# Patient Record
Sex: FEMALE | Race: WHITE | NOT HISPANIC OR LATINO | Employment: OTHER | URBAN - METROPOLITAN AREA
[De-identification: names, ages, dates, MRNs, and addresses within clinical notes are randomized per-mention and may not be internally consistent; named-entity substitution may affect disease eponyms.]

---

## 2017-02-03 ENCOUNTER — PATIENT OUTREACH (OUTPATIENT)
Dept: ADMINISTRATIVE | Facility: HOSPITAL | Age: 82
End: 2017-02-03

## 2017-02-03 NOTE — LETTER
February 3, 2017    Francie Olsen  [address]  [place] [address]             [facility]  [address]  [facility] [address]  Phone: [phone] Dear Ms. Olsen:    [facility] is committed to your overall health.  To help you get the most out of each of your visits, we will review your information to make sure you are up to date on all of your recommended tests and/or procedures.      Evan Bernard MD has found that you may be due for   Health Maintenance Due   Topic    TETANUS VACCINE     Zoster Vaccine     Pneumococcal Vaccine    Mammogram     Influenza Vaccine     Lipid Panel     If you have had any of the above done at another facility, please bring the records or information with you so that your record at [facility] will be complete.    If you are currently taking medication, please bring it with you to your appointment for review.    We will be happy to assist you with scheduling any necessary appointments or you may contact the [facility] appointment desk at [phone] to schedule at your convenience.     Thank you for choosing [facility] for your healthcare needs,    Camelia SOTO LPN Care Coordinator  Care Coordination Department  [facility]

## 2017-03-06 ENCOUNTER — TELEPHONE (OUTPATIENT)
Dept: FAMILY MEDICINE | Facility: CLINIC | Age: 82
End: 2017-03-06

## 2017-03-06 DIAGNOSIS — R10.2 PELVIC PAIN: Primary | ICD-10-CM

## 2017-03-08 ENCOUNTER — TELEPHONE (OUTPATIENT)
Dept: FAMILY MEDICINE | Facility: CLINIC | Age: 82
End: 2017-03-08

## 2017-03-08 ENCOUNTER — HOSPITAL ENCOUNTER (OUTPATIENT)
Dept: RADIOLOGY | Facility: HOSPITAL | Age: 82
Discharge: HOME | End: 2017-03-08
Attending: INTERNAL MEDICINE
Payer: MEDICARE

## 2017-03-08 ENCOUNTER — OFFICE VISIT (OUTPATIENT)
Dept: FAMILY MEDICINE | Facility: CLINIC | Age: 82
End: 2017-03-08
Payer: MEDICARE

## 2017-03-08 VITALS
WEIGHT: 112.63 LBS | TEMPERATURE: 97 F | DIASTOLIC BLOOD PRESSURE: 70 MMHG | BODY MASS INDEX: 19.96 KG/M2 | SYSTOLIC BLOOD PRESSURE: 112 MMHG | HEART RATE: 78 BPM | HEIGHT: 63 IN | RESPIRATION RATE: 18 BRPM | OXYGEN SATURATION: 97 %

## 2017-03-08 DIAGNOSIS — M54.50 MIDLINE LOW BACK PAIN WITHOUT SCIATICA, UNSPECIFIED CHRONICITY: ICD-10-CM

## 2017-03-08 DIAGNOSIS — M54.50 MIDLINE LOW BACK PAIN WITHOUT SCIATICA, UNSPECIFIED CHRONICITY: Primary | ICD-10-CM

## 2017-03-08 PROCEDURE — 1159F MED LIST DOCD IN RCRD: CPT | Mod: S$GLB,,, | Performed by: INTERNAL MEDICINE

## 2017-03-08 PROCEDURE — 99999 PR PBB SHADOW E&M-EST. PATIENT-LVL III: CPT | Mod: PBBFAC,,, | Performed by: INTERNAL MEDICINE

## 2017-03-08 PROCEDURE — 1157F ADVNC CARE PLAN IN RCRD: CPT | Mod: S$GLB,,, | Performed by: INTERNAL MEDICINE

## 2017-03-08 PROCEDURE — 72100 X-RAY EXAM L-S SPINE 2/3 VWS: CPT | Mod: 26,,, | Performed by: RADIOLOGY

## 2017-03-08 PROCEDURE — 1125F AMNT PAIN NOTED PAIN PRSNT: CPT | Mod: S$GLB,,, | Performed by: INTERNAL MEDICINE

## 2017-03-08 PROCEDURE — 1160F RVW MEDS BY RX/DR IN RCRD: CPT | Mod: S$GLB,,, | Performed by: INTERNAL MEDICINE

## 2017-03-08 PROCEDURE — 72100 X-RAY EXAM L-S SPINE 2/3 VWS: CPT | Mod: TC,PO

## 2017-03-08 PROCEDURE — 99213 OFFICE O/P EST LOW 20 MIN: CPT | Mod: S$GLB,,, | Performed by: INTERNAL MEDICINE

## 2017-03-08 PROCEDURE — 99499 UNLISTED E&M SERVICE: CPT | Mod: S$GLB,,, | Performed by: INTERNAL MEDICINE

## 2017-03-08 NOTE — PROGRESS NOTES
Subjective:       Patient ID: Francie Olsen is a 89 y.o. female.    Chief Complaint: Follow-up; Tailbone Pain; and Back Pain  ------------------continues with pain---------now primarily in lower back----------no radiation----------  HPI  Review of Systems   Constitutional: Negative for chills and fever.   HENT: Negative.    Respiratory: Negative for apnea, cough, choking, chest tightness, shortness of breath, wheezing and stridor.    Cardiovascular: Negative for chest pain, palpitations and leg swelling.   Gastrointestinal: Negative for abdominal pain.   Genitourinary: Negative.  Negative for dysuria and urgency.   Musculoskeletal: Positive for back pain and gait problem.       Objective:      Physical Exam   Constitutional: She is oriented to person, place, and time. She appears well-developed and well-nourished. No distress.   HENT:   Head: Normocephalic and atraumatic.   Neck: Normal range of motion. Neck supple. Carotid bruit is not present.   Cardiovascular: Normal rate, regular rhythm, normal heart sounds and intact distal pulses.    Pulmonary/Chest: Effort normal and breath sounds normal. No respiratory distress. She has no wheezes. She has no rales. She exhibits no tenderness.   Abdominal: Soft. Bowel sounds are normal.   Musculoskeletal: Normal range of motion. She exhibits no edema or tenderness.   Neurological: She is alert and oriented to person, place, and time.   Skin: Skin is warm and dry. No rash noted. She is not diaphoretic. No erythema. No pallor.   Psychiatric: She has a normal mood and affect. Her behavior is normal. Judgment and thought content normal.   Nursing note and vitals reviewed.      Assessment:       1. Midline low back pain without sciatica, unspecified chronicity      osteoporosis  Plan:         xray lumbar spine and physiatry consult ------percocet prn---------.                  Does not want f/u for osteoporosis.                   F/u prn or 6 months/

## 2017-03-08 NOTE — TELEPHONE ENCOUNTER
Xray does show L2 compression fracture-------causing the symptoms.          Take the pain med as needed and f/u with physiatry.

## 2017-03-08 NOTE — MR AVS SNAPSHOT
[facility]  [address]  [place] [address]  Phone: [phone]                  Francie Olsen   3/8/2017 10:30 AM   Office Visit    Description:  Female : 1927   Provider:  Evan Bernard MD   Department:  [facility]           Reason for Visit     Follow-up     Tailbone Pain     Back Pain           Diagnoses this Visit        Comments    Midline low back pain without sciatica, unspecified chronicity    -  Primary            To Do List           Future Appointments        Provider Department Dept Phone    3/14/2017 9:20 AM Satya Garnett MD [facility] [phone]    10/17/2017 10:30 AM Evan Singleton MD [facility] [phone]      Goals (5 Years of Data)     None      Follow-Up and Disposition     Return if symptoms worsen or fail to improve.      [facility] On Call     [facility] On Call Nurse Care Line -  Assistance  Registered nurses in the [facility] On Call Center provide clinical advisement, health education, appointment booking, and other advisory services.  Call for this free service at [phone].             Medications           Message regarding Medications     Verify the changes and/or additions to your medication regime listed below are the same as discussed with your clinician today.  If any of these changes or additions are incorrect, please notify your healthcare provider.             Verify that the below list of medications is an accurate representation of the medications you are currently taking.  If none reported, the list may be blank. If incorrect, please contact your healthcare provider. Carry this list with you in case of emergency.           Current Medications     baclofen (LIORESAL) 10 MG tablet Take 1 tablet (10 mg total) by mouth nightly as needed.    calcium carbonate (TUMS) 300 mg (750 mg) Chew Take 750 mg by mouth Twice daily. 1 Tablet, Chewable Oral Twice a  "day     hydrocodone-acetaminophen 7.5-325mg (NORCO) 7.5-325 mg per tablet Take 1 tablet by mouth every 6 (six) hours as needed.    lifitegrast (XIIDRA) 5 % Dpet Place 1 drop into both eyes 2 (two) times daily.    meloxicam (MOBIC) 15 MG tablet Take 15 mg by mouth once daily.    metoprolol succinate (TOPROL-XL) 50 MG 24 hr tablet TAKE 1 TABLET ONE TIME DAILY    Multi-Vitamin tablet Take 1 tablet by mouth Daily. 1 Tablet Oral Every day    NAPHAZOLINE HCL (CLEAR EYES OPHT) Apply to eye.    naproxen sodium (ANAPROX) 550 MG tablet Take 1 tablet (550 mg total) by mouth 2 (two) times daily with meals.    oxycodone-acetaminophen (PERCOCET) 5-325 mg per tablet Take 1 tablet by mouth every 6 (six) hours as needed for Pain.           Clinical Reference Information           Your Vitals Were     BP Pulse Temp Resp    112/70 (BP Location: Left arm, Patient Position: Sitting, BP Method: Manual) 78 97 °F (36.1 °C) (Tympanic) 18    Height Weight SpO2 BMI    5' 3" (1.6 m) 51.1 kg (112 lb 10.5 oz) 97% 19.96 kg/m2      Blood Pressure          Most Recent Value    BP  112/70      Allergies as of 3/8/2017     Codeine    Demerol  [Meperidine]      Immunizations Administered on Date of Encounter - 3/8/2017     None      Orders Placed During Today's Visit     Future Labs/Procedures Expected by Expires    X-Ray Lumbar Spine Ap And Lateral  3/8/2017 3/8/2018      Language Assistance Services     ATTENTION: Language assistance services are available, free of charge. Please call [phone].      ATENCIÓN: Si amarala elva, tiene a john disposición servicios gratuitos de asistencia lingüística. Llame al [phone].     MARICEL Ý: N?u b?n nói Ti?ng Vi?t, có các d?ch v? h? tr? ngôn ng? mi?n phí dành cho b?n. G?i s? [phone].         [facility] complies with applicable Federal civil rights laws and does not discriminate on the basis of race, color, national origin, age, disability, or sex.        "

## 2017-03-14 ENCOUNTER — OFFICE VISIT (OUTPATIENT)
Dept: PAIN MEDICINE | Facility: CLINIC | Age: 82
End: 2017-03-14
Payer: MEDICARE

## 2017-03-14 VITALS
DIASTOLIC BLOOD PRESSURE: 79 MMHG | SYSTOLIC BLOOD PRESSURE: 163 MMHG | WEIGHT: 114.06 LBS | HEART RATE: 70 BPM | HEIGHT: 63 IN | BODY MASS INDEX: 20.21 KG/M2

## 2017-03-14 DIAGNOSIS — M47.817 SPONDYLOSIS OF LUMBOSACRAL REGION WITHOUT MYELOPATHY OR RADICULOPATHY: Primary | ICD-10-CM

## 2017-03-14 PROCEDURE — 99204 OFFICE O/P NEW MOD 45 MIN: CPT | Mod: S$GLB,,, | Performed by: ANESTHESIOLOGY

## 2017-03-14 PROCEDURE — 1125F AMNT PAIN NOTED PAIN PRSNT: CPT | Mod: S$GLB,,, | Performed by: ANESTHESIOLOGY

## 2017-03-14 PROCEDURE — 1160F RVW MEDS BY RX/DR IN RCRD: CPT | Mod: S$GLB,,, | Performed by: ANESTHESIOLOGY

## 2017-03-14 PROCEDURE — 99999 PR PBB SHADOW E&M-EST. PATIENT-LVL III: CPT | Mod: PBBFAC,,, | Performed by: ANESTHESIOLOGY

## 2017-03-14 PROCEDURE — 1159F MED LIST DOCD IN RCRD: CPT | Mod: S$GLB,,, | Performed by: ANESTHESIOLOGY

## 2017-03-14 PROCEDURE — 1157F ADVNC CARE PLAN IN RCRD: CPT | Mod: S$GLB,,, | Performed by: ANESTHESIOLOGY

## 2017-03-14 RX ORDER — HYDROCODONE BITARTRATE AND ACETAMINOPHEN 5; 325 MG/1; MG/1
1 TABLET ORAL 2 TIMES DAILY PRN
Qty: 60 TABLET | Refills: 0 | Status: SHIPPED | OUTPATIENT
Start: 2017-03-14 | End: 2017-03-28 | Stop reason: DRUGHIGH

## 2017-03-14 NOTE — LETTER
March 14, 2017      Evan Bernard MD  [address]  [facility] [address]           [facility]  [address] [place] [address]  Phone: [phone]  Fax: [phone]          Patient: Francie Olsen   MR Number: [id number]   YOB: 1927   Date of Visit: 3/14/2017       Dear Dr. Evan Bernard:    Thank you for referring Francie Olsen to me for evaluation. Attached you will find relevant portions of my assessment and plan of care.    If you have questions, please do not hesitate to call me. I look forward to following Francie Olsen along with you.    Sincerely,    Satya Garnett MD    Enclosure  CC:  No Recipients    If you would like to receive this communication electronically, please contact [email or web address] or [phone] to request more information on [employer] access.    For providers and/or their staff who would like to refer a patient to [facility], please contact us through our one-stop-shop provider referral line, [facility] , at [phone].    If you feel you have received this communication in error or would no longer like to receive these types of communications, please e-mail [email or web address]

## 2017-03-14 NOTE — PROGRESS NOTES
Chief Pain Complaint:  Low Back Pain    History of Present Illness:   This patient is a 89 y.o. female who presents today complaining of the above noted pain/s. The patient describes the pain as follows.    - duration of pain: since nov. 2016  - timing: intermittent   - character: pressure  - radiating, dermatomal: pain is nonradiating  - antecedent trauma, prior spinal surgery: Fall in Nov. 2016, sat down hard, no prior spinal surgery, patient has occipital nerve stimulator in place, [employer]  - pertinent negatives: No fever, No chills, No weight loss, No bladder dysfunction, No bowel dysfunction, No saddle anesthesia  - pertinent positives: patient reports extremity weakness    - medications, other therapies tried (physical therapy, injections):     >> Percocet    >> Has previously undergone Physical Therapy    >> Has previously undergone spinal injection/s, including SI joint injection at [facility] pain management      Imaging / Labs / Studies (reviewed on 3/14/2017):      Results for orders placed during the hospital encounter of 03/08/17   X-Ray Lumbar Spine Ap And Lateral    Narrative AP and lateral views of the lumbar spine  Findings: There is an age-indeterminate compression deformity involving the L2 vertebral body.  This appears to be new when compared to a study from 08/19/2016.  There is approximately 50 percent vertebral height loss anteriorly.  Consider MRI for further evaluation.  There appears to be some retropulsion of fracture fragments.    There is grade 1 spondylolisthesis of L4 on L5. The disk space heights are maintained. There is mild to moderate disc space narrowing noted throughout the lumbar spine greatest at the L4-L5 level.  A generator with leads is seen overlying the right gluteal region.         Review of Systems:  CONSTITUTIONAL: patient denies any fever, chills, or weight loss  SKIN: patient denies any rash or itching  RESPIRATORY: patient denies having any shortness of  "breath  GASTROINTESTINAL: patient reports diarrhea  GENITOURINARY: patient denies having any abnormal bladder function    MUSCULOSKELETAL:  - patient complains of the above noted pain/s (see chief pain complaint)    NEUROLOGICAL:   - pain as above  - strength in Lower extremities is intact, BILATERALLY  - sensation in Upper extremities is intact, BILATERALLY  - patient denies any loss of bowel or bladder control      PSYCHIATRIC: patient denies any change in mood    Other:  All other systems reviewed and are negative      Physical Exam:  BP (!) 163/79  Pulse 70  Ht 5' 3" (1.6 m)  Wt 51.8 kg (114 lb 1.4 oz)  BMI 20.21 kg/m2 (reviewed on 3/14/2017)  General: alert and oriented, in no apparent distress  Gait: normal gait  Skin: No rashes, No discoloration, No obvious lesions  HEENT: EOMI  Cardiovascular: no significant peripheral edema present  Respiratory: respirations nonlabored    Musculoskeletal:  - Any pain on flexion, extension, rotation:    >> pain on extension and rotation  - Straight Leg Raise:     >> LEFT :: negative    >> RIGHT :: negative    - Any tenderness to palpation across paraspinal muscles, joints, bursae:     >> None across lumbar paraspinals    Neuro:  - Extremity Strength:     >> LEFT :: 5/5    >> RIGHT :: 5/5    Psych:  Mood and affect is appropriate      Assessment:  Lumbar Spondylosis      Plan:  Patient has nonradiating low back pain since fall in November 2016.  She reports lower extremity weakness however none is evident on exam.  Lumbar x-ray reviewed in clinic today.  I will check a CT lumbar and have her back for follow-up in 2 weeks.  She was previously seen a couple [facility] management, was prescribed Percocet, underwent SI joint injection which provided marginal relief.  The Percocet makes her drowsy and constipated.  I discuss treatment for constipation, I will switch her to [place].  Imaging / studies reviewed, detailed above.  I discussed in detail the risks, benefits, and " alternatives to any and all potential treatment options.  All questions and concerns were fully addressed today in clinic.      >>Pain Disability Index:  3/14/2017 :: 33

## 2017-03-14 NOTE — MR AVS SNAPSHOT
[facility]  [address]  [place] [address]  Phone: [phone]  Fax: [phone]                  Francie Olsen   3/14/2017 9:20 AM   Office Visit    Description:  Female : 1927   Provider:  Satya Garnett MD   Department:  [facility]           Reason for Visit     Pelvic Pain           Diagnoses this Visit        Comments    Spondylosis of lumbosacral region without myelopathy or radiculopathy    -  Primary            To Do List           Future Appointments        Provider Department Dept Phone    3/17/2017 11:00 AM [facility] CT1 LIMIT 500 LBS [facility] -  [phone]    3/28/2017 10:40 AM Milagros Campoverde PA-C [facility] [phone]    10/17/2017 10:30 AM Evan Singleton MD [facility] [phone]      Goals (5 Years of Data)     None      Follow-Up and Disposition     Return in about 2 weeks (around 3/28/2017) for f/u after Imaging.       These Medications        Disp Refills Start End    hydrocodone-acetaminophen 5-325mg (NORCO) 5-325 mg per tablet 60 tablet 0 3/14/2017 2017    Take 1 tablet by mouth 2 (two) times daily as needed for Pain. - Oral    [facility] [facility] [place] [facility]  [facility].  #: [phone]         [facility] On Call     [facility] On Call Nurse Care Line -  Assistance  Registered nurses in the [facility] On Call Center provide clinical advisement, health education, appointment booking, and other advisory services.  Call for this free service at [phone].             Medications           Message regarding Medications     Verify the changes and/or additions to your medication regime listed below are the same as discussed with your clinician today.  If any of these changes or additions are incorrect, please notify your healthcare provider.        START taking these NEW medications        Refills     "hydrocodone-acetaminophen 5-325mg (NORCO) 5-325 mg per tablet 0    Sig: Take 1 tablet by mouth 2 (two) times daily as needed for Pain.    Class: Normal    Route: Oral      STOP taking these medications     hydrocodone-acetaminophen 7.5-325mg (NORCO) 7.5-325 mg per tablet Take 1 tablet by mouth every 6 (six) hours as needed.    oxycodone-acetaminophen (PERCOCET) 5-325 mg per tablet Take 1 tablet by mouth every 6 (six) hours as needed for Pain.           Verify that the below list of medications is an accurate representation of the medications you are currently taking.  If none reported, the list may be blank. If incorrect, please contact your healthcare provider. Carry this list with you in case of emergency.           Current Medications     baclofen (LIORESAL) 10 MG tablet Take 1 tablet (10 mg total) by mouth nightly as needed.    calcium carbonate (TUMS) 300 mg (750 mg) Chew Take 750 mg by mouth Twice daily. 1 Tablet, Chewable Oral Twice a day     hydrocodone-acetaminophen 5-325mg (NORCO) 5-325 mg per tablet Take 1 tablet by mouth 2 (two) times daily as needed for Pain.    lifitegrast (XIIDRA) 5 % Dpet Place 1 drop into both eyes 2 (two) times daily.    meloxicam (MOBIC) 15 MG tablet Take 15 mg by mouth once daily.    metoprolol succinate (TOPROL-XL) 50 MG 24 hr tablet TAKE 1 TABLET ONE TIME DAILY    Multi-Vitamin tablet Take 1 tablet by mouth Daily. 1 Tablet Oral Every day    NAPHAZOLINE HCL (CLEAR EYES OPHT) Apply to eye.    naproxen sodium (ANAPROX) 550 MG tablet Take 1 tablet (550 mg total) by mouth 2 (two) times daily with meals.           Clinical Reference Information           Your Vitals Were     BP Pulse Height Weight BMI    163/79 70 5' 3" (1.6 m) 51.8 kg (114 lb 1.4 oz) 20.21 kg/m2      Blood Pressure          Most Recent Value    BP  (!)  163/79      Allergies as of 3/14/2017     Codeine    Demerol  [Meperidine]      Immunizations Administered on Date of Encounter - 3/14/2017     None      Orders " Placed During Today's Visit     Future Labs/Procedures Expected by Expires    CT Lumbar Spine Without Contrast  3/14/2017 3/15/2018      Language Assistance Services     ATTENTION: Language assistance services are available, free of charge. Please call [phone].      ATENCIÓN: Si habla leva, tiene a john disposición servicios gratuitos de asistencia lingüística. Llame al [phone].     CHÚ Ý: N?u b?n nói Ti?ng Vi?t, có các d?ch v? h? tr? ngôn ng? mi?n phí dành cho b?n. G?i s? [phone].         [facility] complies with applicable Federal civil rights laws and does not discriminate on the basis of race, color, national origin, age, disability, or sex.

## 2017-03-20 ENCOUNTER — HOSPITAL ENCOUNTER (OUTPATIENT)
Dept: RADIOLOGY | Facility: HOSPITAL | Age: 82
Discharge: HOME | End: 2017-03-20
Attending: ANESTHESIOLOGY
Payer: MEDICARE

## 2017-03-20 DIAGNOSIS — M47.817 SPONDYLOSIS OF LUMBOSACRAL REGION WITHOUT MYELOPATHY OR RADICULOPATHY: ICD-10-CM

## 2017-03-20 PROCEDURE — 72131 CT LUMBAR SPINE W/O DYE: CPT | Mod: TC

## 2017-03-28 ENCOUNTER — OFFICE VISIT (OUTPATIENT)
Dept: PAIN MEDICINE | Facility: CLINIC | Age: 82
End: 2017-03-28
Payer: MEDICARE

## 2017-03-28 VITALS
WEIGHT: 115 LBS | HEART RATE: 76 BPM | DIASTOLIC BLOOD PRESSURE: 73 MMHG | RESPIRATION RATE: 16 BRPM | BODY MASS INDEX: 20.38 KG/M2 | SYSTOLIC BLOOD PRESSURE: 158 MMHG | HEIGHT: 63 IN

## 2017-03-28 DIAGNOSIS — M47.817 SPONDYLOSIS OF LUMBOSACRAL REGION WITHOUT MYELOPATHY OR RADICULOPATHY: Primary | ICD-10-CM

## 2017-03-28 DIAGNOSIS — M48.061 LUMBAR STENOSIS: ICD-10-CM

## 2017-03-28 DIAGNOSIS — S32.000A COMPRESSION FRACTURE OF LUMBAR VERTEBRA, CLOSED, INITIAL ENCOUNTER: ICD-10-CM

## 2017-03-28 DIAGNOSIS — M53.3 SACROILIAC JOINT PAIN: ICD-10-CM

## 2017-03-28 DIAGNOSIS — M51.36 DDD (DEGENERATIVE DISC DISEASE), LUMBAR: ICD-10-CM

## 2017-03-28 PROCEDURE — 99499 UNLISTED E&M SERVICE: CPT | Mod: S$GLB,,, | Performed by: PHYSICIAN ASSISTANT

## 2017-03-28 PROCEDURE — 1159F MED LIST DOCD IN RCRD: CPT | Mod: S$GLB,,, | Performed by: PHYSICIAN ASSISTANT

## 2017-03-28 PROCEDURE — 99214 OFFICE O/P EST MOD 30 MIN: CPT | Mod: S$GLB,,, | Performed by: PHYSICIAN ASSISTANT

## 2017-03-28 PROCEDURE — 1157F ADVNC CARE PLAN IN RCRD: CPT | Mod: S$GLB,,, | Performed by: PHYSICIAN ASSISTANT

## 2017-03-28 PROCEDURE — 1160F RVW MEDS BY RX/DR IN RCRD: CPT | Mod: S$GLB,,, | Performed by: PHYSICIAN ASSISTANT

## 2017-03-28 PROCEDURE — 1125F AMNT PAIN NOTED PAIN PRSNT: CPT | Mod: S$GLB,,, | Performed by: PHYSICIAN ASSISTANT

## 2017-03-28 PROCEDURE — 99999 PR PBB SHADOW E&M-EST. PATIENT-LVL III: CPT | Mod: PBBFAC,,, | Performed by: PHYSICIAN ASSISTANT

## 2017-03-28 RX ORDER — ARM BRACE
1 EACH MISCELLANEOUS DAILY PRN
Qty: 1 EACH | Refills: 0 | Status: SHIPPED | OUTPATIENT
Start: 2017-03-28 | End: 2022-11-19

## 2017-03-28 RX ORDER — HYDROCODONE BITARTRATE AND ACETAMINOPHEN 7.5; 325 MG/1; MG/1
1 TABLET ORAL 2 TIMES DAILY PRN
Qty: 60 TABLET | Refills: 0 | Status: SHIPPED | OUTPATIENT
Start: 2017-03-28 | End: 2017-04-27

## 2017-03-28 NOTE — MR AVS SNAPSHOT
[facility]  [address]  [place] [address]  Phone: [phone]  Fax: [phone]                  Francie Olsen   3/28/2017 10:40 AM   Office Visit    Description:  Female : 1927   Provider:  Milagros Campoverde PA-C   Department:  [facility]           Reason for Visit     Low-back Pain           Diagnoses this Visit        Comments    Spondylosis of lumbosacral region without myelopathy or radiculopathy    -  Primary     Compression fracture of lumbar vertebra, closed, initial encounter                To Do List           Future Appointments        Provider Department Dept Phone    2017 10:40 AM Milagros Campoverde PA-C [facility] [phone]    10/17/2017 10:30 AM Evan Singleton MD [facility] [phone]      Goals (5 Years of Data)     None       These Medications        Disp Refills Start End    back brace Misc 1 each 0 3/28/2017     1 Device by Misc.(Non-Drug; Combo Route) route daily as needed. - Misc.(Non-Drug; Combo Route)    [facility] [address] [facility] [place] [address] [facility]. Ph #: [phone]       hydrocodone-acetaminophen 7.5-325mg (NORCO) 7.5-325 mg per tablet 60 tablet 0 3/28/2017 2017    Take 1 tablet by mouth 2 (two) times daily as needed for Pain. - Oral    [facility] [place] [facility]. Ph #: [phone]         [facility] On Call     [facility] On Call Nurse Care Line -  Assistance  Registered nurses in the [facility] On Call Center provide clinical advisement, health education, appointment booking, and other advisory services.  Call for this free service at [phone].             Medications           Message regarding Medications     Verify the changes and/or additions to your medication regime listed below are the same as discussed with your clinician today.  If any  of these changes or additions are incorrect, please notify your healthcare provider.        START taking these NEW medications        Refills    back brace Misc 0    Si Device by Misc.(Non-Drug; Combo Route) route daily as needed.    Class: Normal    Route: Misc.(Non-Drug; Combo Route)    hydrocodone-acetaminophen 7.5-325mg (NORCO) 7.5-325 mg per tablet 0    Sig: Take 1 tablet by mouth 2 (two) times daily as needed for Pain.    Class: Print    Route: Oral      STOP taking these medications     hydrocodone-acetaminophen 5-325mg (NORCO) 5-325 mg per tablet Take 1 tablet by mouth 2 (two) times daily as needed for Pain.           Verify that the below list of medications is an accurate representation of the medications you are currently taking.  If none reported, the list may be blank. If incorrect, please contact your healthcare provider. Carry this list with you in case of emergency.           Current Medications     baclofen (LIORESAL) 10 MG tablet Take 1 tablet (10 mg total) by mouth nightly as needed.    calcium carbonate (TUMS) 300 mg (750 mg) Chew Take 750 mg by mouth Twice daily. 1 Tablet, Chewable Oral Twice a day     lifitegrast (XIIDRA) 5 % Dpet Place 1 drop into both eyes 2 (two) times daily.    meloxicam (MOBIC) 15 MG tablet Take 15 mg by mouth once daily.    metoprolol succinate (TOPROL-XL) 50 MG 24 hr tablet TAKE 1 TABLET ONE TIME DAILY    Multi-Vitamin tablet Take 1 tablet by mouth Daily. 1 Tablet Oral Every day    NAPHAZOLINE HCL (CLEAR EYES OPHT) Apply to eye.    naproxen sodium (ANAPROX) 550 MG tablet Take 1 tablet (550 mg total) by mouth 2 (two) times daily with meals.    back brace Misc 1 Device by Misc.(Non-Drug; Combo Route) route daily as needed.    hydrocodone-acetaminophen 7.5-325mg (NORCO) 7.5-325 mg per tablet Take 1 tablet by mouth 2 (two) times daily as needed for Pain.           Clinical Reference Information           Your Vitals Were     BP Pulse Resp Height Weight BMI    158/73 (BP  "Location: Right arm, Patient Position: Sitting, BP Method: Automatic) 76 16 5' 3" (1.6 m) 52.2 kg (115 lb) 20.37 kg/m2      Blood Pressure          Most Recent Value    BP  (!)  158/73      Allergies as of 3/28/2017     Codeine    Demerol  [Meperidine]      Immunizations Administered on Date of Encounter - 3/28/2017     None      Language Assistance Services     ATTENTION: Language assistance services are available, free of charge. Please call [phone].      ATENCIÓN: Si habla elva, tiene a john disposición servicios gratuitos de asistencia lingüística. Llame al [phone].     CHÚ Ý: N?u b?n nói Ti?ng Vi?t, có các d?ch v? h? tr? ngôn ng? mi?n phí dành cho b?n. G?i s? [phone].         [facility] complies with applicable Federal civil rights laws and does not discriminate on the basis of race, color, national origin, age, disability, or sex.        "

## 2017-03-28 NOTE — PROGRESS NOTES
Chief Pain Complaint:  Low Back Pain    History of Present Illness:   This patient is a 89 y.o. female who presents today complaining of the above noted pain/s. The patient describes the pain as follows.    - duration of pain: since nov. 2016  - timing: intermittent   - character: pressure  - radiating, dermatomal: pain is nonradiating  - antecedent trauma, prior spinal surgery: Fall in Nov. 2016, sat down hard, no prior spinal surgery, has occipital nerve stimulator in place ([employer])  - pertinent negatives: No fever, No chills, No weight loss, No bladder dysfunction, No bowel dysfunction, No saddle anesthesia  - pertinent positives: patient reports extremity weakness    - medications, other therapies tried (physical therapy, injections):     >> Percocet (caused drowsiness, constipation)    >> Has previously undergone Physical Therapy    >> Has previously undergone spinal injection/s, including SI joint injection at [facility] pain management      Imaging / Labs / Studies (reviewed on 3/28/2017):    3/08/17 X-Ray Lumbar Spine Ap And Lateral    Narrative AP and lateral views of the lumbar spine  Findings: There is an age-indeterminate compression deformity involving the L2 vertebral body.  This appears to be new when compared to a study from 08/19/2016.  There is approximately 50 percent vertebral height loss anteriorly.  Consider MRI for further evaluation.  There appears to be some retropulsion of fracture fragments.    There is grade 1 spondylolisthesis of L4 on L5. The disk space heights are maintained. There is mild to moderate disc space narrowing noted throughout the lumbar spine greatest at the L4-L5 level.  A generator with leads is seen overlying the right gluteal region.       3/20/17 Lumbar CT  History: Low back pain, fracture  There are degenerative vacuum discs at T11-12 and T12-L1. Mild compression deformity of the superior endplate of T12 vertebral body.  L1-2: There is degenerative vacuum disc.  "There is moderately severe compression deformity of L2 vertebral body with retropulsed fragment producing moderately severe spinal stenosis. The fracture involves the anterior pedicles bilaterally.  L2-3: There is degenerative vacuum disc. No spinal stenosis.  L3-4: There is diffuse disc bulge producing mild broad based effacement of the ventral aspect of the thecal sac.  L4-5: There is degenerative vacuum disc. There is moderate bilateral facet joint DJD and degenerative grade 1 anterolisthesis. Mild spinal stenosis.  L5-S1: Marked facet joint DJD on the right and moderate on the left. No spinal stenosis.         Review of Systems:  CONSTITUTIONAL: patient denies any fever, chills, or weight loss  SKIN: patient denies any rash or itching  RESPIRATORY: patient denies having any shortness of breath  GASTROINTESTINAL: patient reports diarrhea  GENITOURINARY: patient denies having any abnormal bladder function    MUSCULOSKELETAL:  - patient complains of the above noted pain/s (see chief pain complaint)    NEUROLOGICAL:   - pain as above  - strength in Lower extremities is intact, BILATERALLY  - sensation in Upper extremities is intact, BILATERALLY  - patient denies any loss of bowel or bladder control      PSYCHIATRIC: patient denies any change in mood    Other:  All other systems reviewed and are negative      Physical Exam:    Vitals:  BP (!) 158/73 (BP Location: Right arm, Patient Position: Sitting, BP Method: Automatic)  Pulse 76  Resp 16  Ht 5' 3" (1.6 m)  Wt 52.2 kg (115 lb)  BMI 20.37 kg/m2   (reviewed on 3/28/2017)    General: alert and oriented, in no apparent distress  Gait: normal gait  Skin: No rashes, No discoloration, No obvious lesions  HEENT: EOMI  Cardiovascular: no significant peripheral edema present  Respiratory: respirations nonlabored    Musculoskeletal:  - Any pain on flexion, extension, rotation:    >> pain on extension and rotation  - Straight Leg Raise:     >> LEFT :: negative    >> RIGHT " :: negative  - Any tenderness to palpation across paraspinal muscles, joints, bursae:     >> None across lumbar paraspinals    >> mostly over SIJ bilaterally, R>L    Neuro:  - Extremity Strength:     >> LEFT :: 5/5    >> RIGHT :: 5/5    Psych:  Mood and affect is appropriate      Assessment:  Lumbar Spondylosis   Lumbar Compression Fracture, L2  Sacroiliitis  Lumbar Spinal Stenosis  Lumbar DDD  Chronic Pain Syndrome        Plan:  Patient returns for follow-up. She has nonradiating low back pain since fall in November 2016.  She reports lower extremity weakness, however none is evident on exam. She was previously seen at [facility] and had an SIJ injection that provided marginal relief.   - CT lumbar results reviewed, detailed above. Results discussed with patient.  - She is not interested in having kyphoplasty at her age, so we will hold off on ordering a bone scan to determine acuity of fracture.  - Increase Norco 5mg BID PRN pain to 7.5mg BID PRN - due to inadequate pain relief. She is taking stool softeners to help with OIC, which she does not think has been a problem since doing this.   - Order back brace to alleviate pressure off of the spine in order to promote healing.   - Order compound cream for potential topical pain relief. Pt will be contacted for [facility] regarding cost and payment.   RTC in 1 month for med refill. I discussed the risks, benefits, and alternatives to potential treatment options. All questions and concerns were fully addressed today in clinic. Dr. Garnett was consulted regarding the patient plan and agrees.            >>Pain Disability Index:  3/14/2017 :: 33  3/28/2017 :: 56    >>Opioid Risk Tool:  3/29/2017 :: 0

## 2017-07-25 ENCOUNTER — OFFICE VISIT (OUTPATIENT)
Dept: FAMILY MEDICINE | Facility: CLINIC | Age: 82
End: 2017-07-25
Payer: MEDICARE

## 2017-07-25 ENCOUNTER — HOSPITAL ENCOUNTER (OUTPATIENT)
Dept: RADIOLOGY | Facility: HOSPITAL | Age: 82
Discharge: HOME | End: 2017-07-25
Attending: INTERNAL MEDICINE
Payer: MEDICARE

## 2017-07-25 VITALS
BODY MASS INDEX: 18.75 KG/M2 | OXYGEN SATURATION: 99 % | RESPIRATION RATE: 16 BRPM | TEMPERATURE: 96 F | DIASTOLIC BLOOD PRESSURE: 70 MMHG | SYSTOLIC BLOOD PRESSURE: 112 MMHG | HEART RATE: 65 BPM | HEIGHT: 63 IN | WEIGHT: 105.81 LBS

## 2017-07-25 DIAGNOSIS — M54.9 ACUTE RIGHT-SIDED BACK PAIN, UNSPECIFIED BACK LOCATION: Primary | ICD-10-CM

## 2017-07-25 DIAGNOSIS — R07.81 RIB PAIN ON RIGHT SIDE: ICD-10-CM

## 2017-07-25 DIAGNOSIS — M54.9 ACUTE RIGHT-SIDED BACK PAIN, UNSPECIFIED BACK LOCATION: ICD-10-CM

## 2017-07-25 PROCEDURE — 71100 X-RAY EXAM RIBS UNI 2 VIEWS: CPT | Mod: 26,,, | Performed by: RADIOLOGY

## 2017-07-25 PROCEDURE — 71100 X-RAY EXAM RIBS UNI 2 VIEWS: CPT | Mod: TC,PO

## 2017-07-25 PROCEDURE — 99213 OFFICE O/P EST LOW 20 MIN: CPT | Mod: S$GLB,,, | Performed by: INTERNAL MEDICINE

## 2017-07-25 PROCEDURE — 71020 XR CHEST PA AND LATERAL: CPT | Mod: TC,PO

## 2017-07-25 PROCEDURE — 1125F AMNT PAIN NOTED PAIN PRSNT: CPT | Mod: S$GLB,,, | Performed by: INTERNAL MEDICINE

## 2017-07-25 PROCEDURE — 1159F MED LIST DOCD IN RCRD: CPT | Mod: S$GLB,,, | Performed by: INTERNAL MEDICINE

## 2017-07-25 PROCEDURE — 99999 PR PBB SHADOW E&M-EST. PATIENT-LVL IV: CPT | Mod: PBBFAC,,, | Performed by: INTERNAL MEDICINE

## 2017-07-25 PROCEDURE — 71020 XR CHEST PA AND LATERAL: CPT | Mod: 26,,, | Performed by: RADIOLOGY

## 2017-07-25 RX ORDER — MELOXICAM 15 MG/1
15 TABLET ORAL DAILY
Qty: 30 TABLET | Refills: 0 | Status: SHIPPED | OUTPATIENT
Start: 2017-07-25 | End: 2017-09-25

## 2017-07-25 NOTE — PROGRESS NOTES
Subjective:       Patient ID: Francie Olsen is a 89 y.o. female.    Chief Complaint: Back Pain  -fell past Sunday in her kitchen    And hit right lower ribs--------------painful--------at site---------  HPI  Review of Systems   Constitutional: Negative for chills and fever.   HENT: Negative.    Respiratory: Negative for apnea, cough, choking, chest tightness, shortness of breath, wheezing and stridor.    Cardiovascular: Negative for chest pain, palpitations and leg swelling.   Genitourinary: Negative.    Neurological: Negative.        Objective:      Physical Exam   Constitutional: She appears well-developed and well-nourished.   Cardiovascular: Normal rate, regular rhythm, normal heart sounds and intact distal pulses.    Pulmonary/Chest: Effort normal and breath sounds normal.   Musculoskeletal:   Tender to palpate right lower ribs-----   Nursing note and vitals reviewed.      Assessment:       1. Acute right-sided back pain, unspecified back location    2. Rib pain on right side        Plan:        cxr with right rib series.         mobic 15 mg a day.                 To see her back pain doc next week .            F/u 1 month,

## 2017-07-26 ENCOUNTER — TELEPHONE (OUTPATIENT)
Dept: FAMILY MEDICINE | Facility: CLINIC | Age: 82
End: 2017-07-26

## 2017-07-26 RX ORDER — OXYCODONE AND ACETAMINOPHEN 5; 325 MG/1; MG/1
1 TABLET ORAL EVERY 6 HOURS PRN
Refills: 0 | COMMUNITY
Start: 2017-07-25 | End: 2018-01-08 | Stop reason: SDUPTHER

## 2017-07-26 NOTE — TELEPHONE ENCOUNTER
----- Message from Therese Block sent at 7/26/2017  8:40 AM CDT -----  Contact: jama/hermilo [phone]  States that he is calling for xray results. Please call back at [phone]//thank you acc

## 2017-07-26 NOTE — TELEPHONE ENCOUNTER
Spoke with pt, aware of results, Expressed understanding. Pt states she has some severe pain and wants to know if it's anything else that she can do or take. Pain is worse today, rated at 10.

## 2017-07-26 NOTE — TELEPHONE ENCOUNTER
States she's only had one so far, she states it tends to be worse with certain movements she get these sharp pains. Advised her to limit moving on it, elevate,  and take her medication and let us know if that helps.

## 2017-09-25 ENCOUNTER — OFFICE VISIT (OUTPATIENT)
Dept: FAMILY MEDICINE | Facility: CLINIC | Age: 82
End: 2017-09-25
Payer: MEDICARE

## 2017-09-25 VITALS
HEIGHT: 63 IN | BODY MASS INDEX: 17.97 KG/M2 | WEIGHT: 101.44 LBS | DIASTOLIC BLOOD PRESSURE: 80 MMHG | OXYGEN SATURATION: 99 % | HEART RATE: 62 BPM | SYSTOLIC BLOOD PRESSURE: 120 MMHG

## 2017-09-25 DIAGNOSIS — I10 ESSENTIAL HYPERTENSION: ICD-10-CM

## 2017-09-25 DIAGNOSIS — M81.0 OSTEOPOROSIS, UNSPECIFIED OSTEOPOROSIS TYPE, UNSPECIFIED PATHOLOGICAL FRACTURE PRESENCE: ICD-10-CM

## 2017-09-25 DIAGNOSIS — E78.2 MIXED HYPERLIPIDEMIA: ICD-10-CM

## 2017-09-25 DIAGNOSIS — Z00.00 ENCOUNTER FOR PREVENTIVE HEALTH EXAMINATION: Primary | ICD-10-CM

## 2017-09-25 DIAGNOSIS — R63.4 ABNORMAL WEIGHT LOSS: ICD-10-CM

## 2017-09-25 DIAGNOSIS — Z85.828 HISTORY OF SCC (SQUAMOUS CELL CARCINOMA) OF SKIN: ICD-10-CM

## 2017-09-25 DIAGNOSIS — G25.0 FAMILIAL TREMOR: ICD-10-CM

## 2017-09-25 DIAGNOSIS — H35.30 MACULAR DEGENERATION: ICD-10-CM

## 2017-09-25 DIAGNOSIS — M54.5 CHRONIC RIGHT-SIDED LOW BACK PAIN, WITH SCIATICA PRESENCE UNSPECIFIED: ICD-10-CM

## 2017-09-25 DIAGNOSIS — M54.81 OCCIPITAL NEURALGIA OF RIGHT SIDE: Chronic | ICD-10-CM

## 2017-09-25 DIAGNOSIS — H91.93 BILATERAL HEARING LOSS, UNSPECIFIED HEARING LOSS TYPE: ICD-10-CM

## 2017-09-25 DIAGNOSIS — M51.34 DDD (DEGENERATIVE DISC DISEASE), THORACIC: ICD-10-CM

## 2017-09-25 DIAGNOSIS — I77.1 TORTUOUS AORTA: ICD-10-CM

## 2017-09-25 DIAGNOSIS — G89.29 CHRONIC RIGHT-SIDED LOW BACK PAIN, WITH SCIATICA PRESENCE UNSPECIFIED: ICD-10-CM

## 2017-09-25 PROBLEM — M54.50 CHRONIC LOW BACK PAIN: Status: ACTIVE | Noted: 2017-09-25

## 2017-09-25 PROCEDURE — 99999 PR PBB SHADOW E&M-EST. PATIENT-LVL IV: CPT | Mod: PBBFAC,,, | Performed by: NURSE PRACTITIONER

## 2017-09-25 PROCEDURE — 99499 UNLISTED E&M SERVICE: CPT | Mod: S$GLB,,, | Performed by: NURSE PRACTITIONER

## 2017-09-25 PROCEDURE — G0439 PPPS, SUBSEQ VISIT: HCPCS | Mod: S$GLB,,, | Performed by: NURSE PRACTITIONER

## 2017-09-25 NOTE — PATIENT INSTRUCTIONS
Counseling and Referral of Other Preventative  (Italic type indicates deductible and co-insurance are waived)    Patient Name: Francie Olsen  Today's Date: 9/25/2017      SERVICE LIMITATIONS RECOMMENDATION    Vaccines    · Pneumococcal (once after 65)    · Influenza (annually)    · Hepatitis B (if medium/high risk)    · Prevnar 13      Hepatitis B medium/high risk factors:       - End-stage renal disease       - Hemophiliacs who received Factor VII or         IX concentrates       - Clients of institutions for the mentally             retarded       - Persons who live in the same house as          a HepB carrier       - Homosexual men       - Illicit injectable drug abusers     Pneumococcal: Done, no repeat necessary     Influenza: Done, repeat in one year     Hepatitis B: N/A     Prevnar 13: Scheduled - see appointments    Mammogram (biennial age 50-74)  Annually (age 40 or over)  N/A    Pap (up to age 70 and after 70 if unknown history or abnormal study last 10 years)    N/A     The USPSTF recommends against screening for cervical cancer in women older than age 65 years who have had adequate prior screening and are not otherwise at high risk for cervical cancer.      Colorectal cancer screening (to age 75)    · Fecal occult blood test (annual)  · Flexible sigmoidoscopy (5y)  · Screening colonoscopy (10y)  · Barium enema   N/A    Diabetes self-management training (no USPSTF recommendations)  Requires referral by treating physician for patient with diabetes or renal disease. 10 hours of initial DSMT sessions of no less than 30 minutes each in a continuous 12-month period. 2 hours of follow-up DSMT in subsequent years.  N/A    Bone mass measurements (age 65 & older, biennial)  Requires diagnosis related to osteoporosis or estrogen deficiency. Biennial benefit unless patient has history of long-term glucocorticoid  N/A    Glaucoma screening (no USPSTF recommendation)  Diabetes mellitus, family history     American, age 50 or over    American, age 65 or over  Done this year, repeat every year    Medical nutrition therapy for diabetes or renal disease (no recommended schedule)  Requires referral by treating physician for patient with diabetes or renal disease or kidney transplant within the past 3 years.  Can be provided in same year as diabetes self-management training (DSMT), and CMS recommends medical nutrition therapy take place after DSMT. Up to 3 hours for initial year and 2 hours in subsequent years.  N/A    Cardiovascular screening blood tests (every 5 years)  · Fasting lipid panel  Order as a panel if possible  Done this year, repeat every year    Diabetes screening tests (at least every 3 years, Medicare covers annually or at 6-month intervals for prediabetic patients)  · Fasting blood sugar (FBS) or glucose tolerance test (GTT)  Patient must be diagnosed with one of the following:       - Hypertension       - Dyslipidemia       - Obesity (BMI 30kg/m2)       - Previous elevated impaired FBS or GTT       ... or any two of the following:       - Overweight (BMI 25 but <30)       - Family history of diabetes       - Age 65 or older       - History of gestational diabetes or birth of baby weighing more than 9 pounds  Done this year, repeat every year    HIV screening (annually for increased risk patients)  · HIV-1 and HIV-2 by EIA, or HUBERT, rapid antibody test or oral mucosa transudate  Patients must be at increased risk for HIV infection per USPSTF guidelines or pregnant. Tests covered annually for patient at increased risk or as requested by the patient. Pregnant patients may receive up to 3 tests during pregnancy.  Risks discussed, screening is not recommended    Smoking cessation counseling (up to 8 sessions per year)  Patients must be asymptomatic of tobacco-related conditions to receive as a preventative service.  Non-smoker    Subsequent annual wellness visit  At least 12 months since last AWV   Return in one year     The following information is provided to all patients.  This information is to help you find resources for any of the problems found today that may be affecting your health:                Living healthy guide: www.[facility].[place].[facility]      Understanding Diabetes: www.diabetes.org      Eating healthy: www.cdc.gov/healthyweight      CDC home safety checklist: www.cdc.gov/steadi/patient.html      Agency on Aging: www.goea.[place].[facility]      Alcoholics anonymous (AA): www.aa.org      Physical Activity: www.yordan.nih.gov/jq5ezfw      Tobacco use: www.quitwithusla.org

## 2017-09-25 NOTE — PROGRESS NOTES
"Francie Olsen presented for a  Medicare AWV and comprehensive Health Risk Assessment today. The following components were reviewed and updated:    · Medical history  · Family History  · Social history  · Allergies and Current Medications  · Health Risk Assessment  · Health Maintenance  · Care Team     ** See Completed Assessments for Annual Wellness Visit within the encounter summary.**       The following assessments were completed:  · Living Situation  · CAGE  · Depression Screening  · Timed Get Up and Go  · Whisper Test  · Cognitive Function Screening  · Nutrition Screening  · ADL Screening  · PAQ Screening    Vitals:    09/25/17 1011   BP: 120/80   BP Location: Right arm   Patient Position: Sitting   Pulse: 62   SpO2: 99%   Weight: 46 kg (101 lb 6.6 oz)   Height: 5' 3" (1.6 m)     Body mass index is 17.96 kg/m².  Physical Exam   Constitutional: She appears well-developed and well-nourished.   HENT:   Head: Normocephalic and atraumatic.   Eyes: Pupils are equal, round, and reactive to light.   Neck: Carotid bruit is not present.   Cardiovascular: Normal rate, normal heart sounds, intact distal pulses and normal pulses.  An irregular rhythm present. Exam reveals no gallop.    No murmur heard.  Pulmonary/Chest: Effort normal and breath sounds normal.   Abdominal: Soft. Normal appearance and bowel sounds are normal. She exhibits no distension. There is no tenderness.   Musculoskeletal: She exhibits no edema or tenderness.        Lumbar back: She exhibits decreased range of motion.   Neurological: She is alert. She exhibits normal muscle tone.   Skin: Skin is warm, dry and intact.   Psychiatric: She has a normal mood and affect. Her speech is normal and behavior is normal. Judgment and thought content normal. Cognition and memory are normal.   Nursing note and vitals reviewed.        Diagnoses and health risks identified today and associated recommendations/orders:    1. Encounter for preventive health " examination    2. Chronic right-sided low back pain, with sciatica presence unspecified  Chronic and ongoing pain. States it happen after a fall over a yr ago. Currently on on Mobic and  Percocet daily . Under the care of pain management of - Spine MD- states she will have a lumbar procedure soon    3. Tortuous aorta  7/25/ 2017 chest xray showed there is tortuosity of the descending thoracic aorta.  Stable and controlled. Continue current treatment plan as previously prescribed with your PCP.     4. Essential hypertension  Stable and controlled on Toprol daily  Continue current treatment plan as previously prescribed with your PCP- Joana     5. Mixed hyperlipidemia  Component      Latest Ref Rng & Units 8/10/2015   Cholesterol      120 - 199 mg/dL 149   Triglycerides      30 - 150 mg/dL 105   HDL      40 - 75 mg/dL 66   LDL Cholesterol      63.0 - 159.0 mg/dL 62.0 (L)   HDL/Chol Ratio      20.0 - 50.0 % 44.3   Total Cholesterol/HDL Ratio      2.0 - 5.0 2.3   Stable and controlled. Continue current treatment plan as previously prescribed with your PCP.     6. Osteoporosis, unspecified osteoporosis type, unspecified pathological fracture presence  Chronic and stable. Pt states she unable to do have BDS due worsen bone loss and fragility.  She's taking calcium daily.. No recent fall this year or broken bones Followed PCP     7. DDD (degenerative disc disease), thoracic  Thoracic xray 8/16/ 2016  There is generalized osteopenia and accentuated kyphotic curvature of the spine.  Degenerative vertebral endplate spurring present at multiple levels without significant disc space narrowing  Stable and controlled on Mobic and Baclofen daily     Continue current treatment plan as previously prescribed with your PCP.      8. Bilateral hearing loss, unspecified hearing loss type  Chronic and stable - states hearing has not worsen- states she was unable to tolerate hearing aides- decline new ones. Follow up with PCP      9. Macular degeneration  Pt reports vision stable with glasses .Chronic and stable . Followed by optholleonardo Cadena .      10. Occipital neuralgia of right side /s/p pain stimulator implant  Pt reports pain/ stimulator over the last  10 years- works well  Stable and controlled. Continue current treatment plan as previously prescribed with pain management     11. Familial tremor  Pt reports chronic tremors that has not worsen -still with good use of hands and request not to be on medication for it  Chronic and stable at this time. Followed by PCP    12. History of SCC (squamous cell carcinoma) of skin  S/P left hand lesion removal 11/0/2015 and left ear lesion removal  2006  Denies new lesions. Followed  By dermatologist    13 Abnormal weight loss  This is a new problem that has been identified during today's visit.    er chart over a  15 lbs wt loss since 1/ 2017  Please follow up with your PCP to discuss the next steps.    Pt scheduled for a annual exam in 10/2017    Provided [place] with a 5-10 year written screening schedule and personal prevention plan. Recommendations were developed using the USPSTF age appropriate recommendations. Education, counseling, and referrals were provided as needed. After Visit Summary printed and given to patient which includes a list of additional screenings\tests needed.    Return in about 1 year (around 9/25/2018).    Jayda Hagan NP

## 2017-09-25 NOTE — Clinical Note
Your patient was seen today for a HRA visit. I have included a copy of my visit note, please review the note and feel free to contact me with any questions.  Thank you for allowing me to participate in the care of your patients.  Jayda Hagan NP

## 2017-10-17 ENCOUNTER — OFFICE VISIT (OUTPATIENT)
Dept: OPHTHALMOLOGY | Facility: CLINIC | Age: 82
End: 2017-10-17
Payer: MEDICARE

## 2017-10-17 DIAGNOSIS — H04.129 DRY EYE: ICD-10-CM

## 2017-10-17 DIAGNOSIS — H52.7 REFRACTIVE ERROR: ICD-10-CM

## 2017-10-17 DIAGNOSIS — H35.3130 AGE-RELATED MACULAR DEGENERATION, DRY, BOTH EYES: Primary | ICD-10-CM

## 2017-10-17 DIAGNOSIS — Z96.1 PSEUDOPHAKIA: ICD-10-CM

## 2017-10-17 PROCEDURE — 92015 DETERMINE REFRACTIVE STATE: CPT | Mod: S$GLB,,, | Performed by: OPHTHALMOLOGY

## 2017-10-17 PROCEDURE — 99999 PR PBB SHADOW E&M-EST. PATIENT-LVL II: CPT | Mod: PBBFAC,,, | Performed by: OPHTHALMOLOGY

## 2017-10-17 PROCEDURE — 92014 COMPRE OPH EXAM EST PT 1/>: CPT | Mod: S$GLB,,, | Performed by: OPHTHALMOLOGY

## 2017-10-17 NOTE — PROGRESS NOTES
SUBJECTIVE:   Francie Olsen is a 89 y.o. female   Corrected distance visual acuity was 20/60 in the right eye and 20/30 +1 in the left eye.   Chief Complaint   Patient presents with    Macular Degeneration     1 year RTC        HPI:  HPI     Macular Degeneration    Additional comments: 1 year RTC           Comments   Pt states she's been having trouble seeing clearly, hard to read after   about 30-45 minutes. She uses Clear eyes for dryness, seems to work better   than the other drops. Did not try the Xiidra-states the pharmacist laughed   at her when she brought the coupon in last year. Not having any ocular   pain or irritation.     1. PCIOL OU  2. YAG OU  3. Dry AMD OS>OD  4. Dry Eyes  5. Refractive Error    AFT's 4-5 times daily OU       Last edited by Mil Mosley on 10/17/2017 10:49 AM. (History)        Assessment /Plan :  1. Age-related macular degeneration, dry, both eyes stable   2. Pseudophakia stable   3. Dry eye continue artificial tears prn OU, add Xiidra OU BID   4. Refractive error PAL Rx       RTC in 1 year or prn any changes.

## 2018-01-03 ENCOUNTER — TELEPHONE (OUTPATIENT)
Dept: FAMILY MEDICINE | Facility: CLINIC | Age: 83
End: 2018-01-03

## 2018-01-05 NOTE — TELEPHONE ENCOUNTER
Pt notified that provider does not prescribe that particular medication for pain. Appt made to see PCP. Pt voiced understanding.

## 2018-01-08 ENCOUNTER — OFFICE VISIT (OUTPATIENT)
Dept: FAMILY MEDICINE | Facility: CLINIC | Age: 83
End: 2018-01-08
Payer: MEDICARE

## 2018-01-08 VITALS
TEMPERATURE: 97 F | HEART RATE: 66 BPM | BODY MASS INDEX: 17.5 KG/M2 | WEIGHT: 98.75 LBS | RESPIRATION RATE: 16 BRPM | SYSTOLIC BLOOD PRESSURE: 122 MMHG | OXYGEN SATURATION: 97 % | DIASTOLIC BLOOD PRESSURE: 80 MMHG | HEIGHT: 63 IN

## 2018-01-08 DIAGNOSIS — M51.34 DDD (DEGENERATIVE DISC DISEASE), THORACIC: Primary | ICD-10-CM

## 2018-01-08 PROCEDURE — 99214 OFFICE O/P EST MOD 30 MIN: CPT | Mod: S$GLB,,, | Performed by: INTERNAL MEDICINE

## 2018-01-08 PROCEDURE — 99999 PR PBB SHADOW E&M-EST. PATIENT-LVL IV: CPT | Mod: PBBFAC,,, | Performed by: INTERNAL MEDICINE

## 2018-01-08 RX ORDER — OXYCODONE AND ACETAMINOPHEN 7.5; 325 MG/1; MG/1
1 TABLET ORAL EVERY 6 HOURS PRN
Refills: 0 | COMMUNITY
Start: 2017-10-19 | End: 2018-01-08

## 2018-01-08 RX ORDER — OXYCODONE AND ACETAMINOPHEN 7.5; 325 MG/1; MG/1
1 TABLET ORAL DAILY PRN
Qty: 30 TABLET | Refills: 0 | Status: SHIPPED | OUTPATIENT
Start: 2018-01-08 | End: 2019-10-04

## 2018-01-08 NOTE — PROGRESS NOTES
Subjective:       Patient ID: Francie Olsen is a 89 y.o. female.    Chief Complaint: Back Pain  ---------------chronic back pain-----------seen by multiple docs for this---------would like refill percocet----------which helps----------not currently seeing any other doc for pain meds---------HPI  Review of Systems   Constitutional: Negative for chills and fever.   HENT: Negative.    Respiratory: Negative for apnea, cough, choking, chest tightness, shortness of breath, wheezing and stridor.    Cardiovascular: Negative for chest pain, palpitations and leg swelling.   Gastrointestinal: Negative.    Musculoskeletal: Positive for back pain.   Neurological: Negative.        Objective:      Physical Exam   Constitutional: She is oriented to person, place, and time. She appears well-developed and well-nourished. No distress.   HENT:   Head: Normocephalic and atraumatic.   Neck: Normal range of motion. Neck supple. Carotid bruit is not present.   Cardiovascular: Normal rate, regular rhythm, normal heart sounds and intact distal pulses.    Pulmonary/Chest: Effort normal and breath sounds normal. No respiratory distress. She has no wheezes. She has no rales. She exhibits no tenderness.   Abdominal: Soft. Bowel sounds are normal. She exhibits no distension. There is no tenderness.   Musculoskeletal: Normal range of motion. She exhibits tenderness. She exhibits no edema.   Neurological: She is alert and oriented to person, place, and time.   Skin: Skin is warm and dry. No rash noted. She is not diaphoretic. No erythema. No pallor.   Psychiatric: She has a normal mood and affect. Her behavior is normal. Judgment and thought content normal.   Nursing note and vitals reviewed.      Assessment:       1. DDD (degenerative disc disease), thoracic        Plan:         percocet 7.5 mg q day prn----------f/u as scheduled.

## 2018-02-01 RX ORDER — METOPROLOL SUCCINATE 50 MG/1
TABLET, EXTENDED RELEASE ORAL
Qty: 90 TABLET | Refills: 3 | Status: SHIPPED | OUTPATIENT
Start: 2018-02-01 | End: 2018-12-04 | Stop reason: SDUPTHER

## 2018-08-01 ENCOUNTER — PES CALL (OUTPATIENT)
Dept: ADMINISTRATIVE | Facility: CLINIC | Age: 83
End: 2018-08-01

## 2018-09-14 ENCOUNTER — PES CALL (OUTPATIENT)
Dept: ADMINISTRATIVE | Facility: CLINIC | Age: 83
End: 2018-09-14

## 2018-10-19 ENCOUNTER — PES CALL (OUTPATIENT)
Dept: ADMINISTRATIVE | Facility: CLINIC | Age: 83
End: 2018-10-19

## 2018-11-06 ENCOUNTER — OFFICE VISIT (OUTPATIENT)
Dept: OPHTHALMOLOGY | Facility: CLINIC | Age: 83
End: 2018-11-06
Payer: MEDICARE

## 2018-11-06 DIAGNOSIS — H04.129 DRY EYE: ICD-10-CM

## 2018-11-06 DIAGNOSIS — Z96.1 PSEUDOPHAKIA: ICD-10-CM

## 2018-11-06 DIAGNOSIS — H35.3132 INTERMEDIATE STAGE NONEXUDATIVE AGE-RELATED MACULAR DEGENERATION OF BOTH EYES: Primary | ICD-10-CM

## 2018-11-06 PROCEDURE — 99999 PR PBB SHADOW E&M-EST. PATIENT-LVL II: CPT | Mod: PBBFAC,HCNC,, | Performed by: OPHTHALMOLOGY

## 2018-11-06 PROCEDURE — 92014 COMPRE OPH EXAM EST PT 1/>: CPT | Mod: HCNC,S$GLB,, | Performed by: OPHTHALMOLOGY

## 2018-11-06 NOTE — PROGRESS NOTES
SUBJECTIVE:   Francie Olsen is a 89 y.o. female   Corrected distance visual acuity was 20/80 -2 in the right eye and 20/25 -2 in the left eye.   Chief Complaint   Patient presents with    Eye Exam     1 Year PSEUDO        HPI:  HPI     Eye Exam      Additional comments: 1 Year PSEUDO              Comments     Patient States She notice a little change in vision, sees a black square   shape with ruff edges in the right eye  and it comes & goes but no   discomfort.     1. PCIOL OU  2. YAG OU  3. Dry AMD OS>OD  4. Dry Eyes  5. Refractive Error    OU: Clear Eyes PRN          Last edited by Vilma Davila on 11/6/2018  9:07 AM. (History)        Assessment /Plan :  1. Intermediate stage nonexudative age-related macular degeneration of both eyes Exam consistent with age related macular degeneration OU. Discussed clinical condition and recommend avoidance of tobacco products. Patient advised to call immediately if any visual changes occur. Regular follow up recommended.      2. Pseudophakia  -- Condition stable, no therapeutic change required. Monitoring routinely.     3. Dry eye Findings and symptoms consistent with dry eyes. Dry eye instruction sheet reviewed with patient recommending:AT's qid/titrate prn, Lubricating Oint qhs and prn and [place] 3 Fish Oils [id number] mg po bid       RTC in 1 year or prn any changes

## 2018-12-04 RX ORDER — METOPROLOL SUCCINATE 50 MG/1
TABLET, EXTENDED RELEASE ORAL
Qty: 90 TABLET | Refills: 3 | Status: SHIPPED | OUTPATIENT
Start: 2018-12-04 | End: 2019-09-24 | Stop reason: SDUPTHER

## 2019-09-25 RX ORDER — METOPROLOL SUCCINATE 50 MG/1
TABLET, EXTENDED RELEASE ORAL
Qty: 90 TABLET | Refills: 3 | Status: SHIPPED | OUTPATIENT
Start: 2019-09-25 | End: 2020-07-05

## 2019-10-03 NOTE — PROGRESS NOTES
Subjective:       Patient ID: Francie Olsen is a 89 y.o. female.    Chief Complaint   Patient presents with    Back Pain         Back Pain   This is a new (Reports possible infection at old incisional site for e-stim implantation) problem. The current episode started in the past 7 days. The problem has been gradually worsening since onset. The pain does not radiate. The pain is at a severity of 8/10. Pertinent negatives include no numbness, paresthesias, tingling or weakness. She has tried nothing for the symptoms.         CT Lumbar Spine Without Contrast   Details     Reading Physician Reading Date Result Priority   Ray Dawn MD 3/20/2017       Narrative     History: Low back pain, fracture    There are degenerative vacuum discs at T11-12 and T12-L1. Mild compression deformity of the superior endplate of T12 vertebral body.    L1-2: There is degenerative vacuum disc. There is moderately severe compression deformity of L2 vertebral body with retropulsed fragment producing moderately severe spinal stenosis. The fracture involves the anterior pedicles bilaterally.    L2-3: There is degenerative vacuum disc. No spinal stenosis.    L3-4: There is diffuse disc bulge producing mild broad based effacement of the ventral aspect of the thecal sac.    L4-5: There is degenerative vacuum disc. There is moderate bilateral facet joint DJD and degenerative grade 1 anterolisthesis. Mild spinal stenosis.    L5-S1: Marked facet joint DJD on the right and moderate on the left. No spinal stenosis.                      Review of Systems   Constitutional: Negative.    Respiratory: Negative.    Cardiovascular: Negative.    Musculoskeletal: Positive for back pain (incision).   Skin: Positive for color change.   Neurological: Negative.  Negative for tingling, weakness, numbness and paresthesias.         Review of patient's allergies indicates:   Allergen Reactions    Codeine      Other reaction(s): Nausea    Demerol   "[meperidine]      Other reaction(s): Nausea         Medication List with Changes/Refills   Current Medications    BACK BRACE MISC    1 Device by Misc.(Non-Drug; Combo Route) route daily as needed.    CALCIUM CARBONATE (TUMS) 300 MG (750 MG) CHEW    Take 750 mg by mouth Twice daily. 1 Tablet, Chewable Oral Twice a day     LIFITEGRAST (XIIDRA) 5 % DPET    Place 1 drop into both eyes 2 (two) times daily.    METOPROLOL SUCCINATE (TOPROL-XL) 50 MG 24 HR TABLET    TAKE 1 TABLET EVERY DAY    MULTI-VITAMIN TABLET    Take 1 tablet by mouth Daily. 1 Tablet Oral Every day    NAPHAZOLINE HCL (CLEAR EYES OPHT)    Apply to eye.   Discontinued Medications    FLUAD [id number], 65 YR UP,,PF, 45 MCG (15 MCG X 3)/0.5 ML SYRG    inject 0.5 milliliter intramuscularly    OXYCODONE-ACETAMINOPHEN (PERCOCET) 7.5-325 MG PER TABLET    Take 1 tablet by mouth daily as needed for Pain.       Patient Active Problem List   Diagnosis    Hypertension    Hyperlipemia    Familial tremor    DDD (degenerative disc disease), thoracic    Osteoporosis    Macular degeneration    Bilateral hearing loss    History of SCC (squamous cell carcinoma) of skin    Occipital neuralgia of right side /s/p pain stimulator implant    Tortuous aorta    Dry eye    Pseudophakia    Refractive error    Chronic low back pain    Abnormal weight loss    Intermediate stage nonexudative age-related macular degeneration of both eyes         Past medical, surgical, family and social histories have been reviewed today.        Objective:     Vitals:    10/04/19 1029   BP: (!) 156/80   Pulse: 79   Temp: 97.5 °F (36.4 °C)   TempSrc: Oral   SpO2: 97%   Weight: 50.8 kg (111 lb 15.9 oz)   Height: 5' 3" (1.6 m)   PainSc:   8   PainLoc: Back         Physical Exam   Constitutional: She is oriented to person, place, and time. She appears well-developed and well-nourished. No distress.   Neurological: She is alert and oriented to person, place, and time.   Skin: She is not " diaphoretic.        Old e-stim implantation site with early s/s infection --- area TTP with slight redness, no drainage, mild warmth.   Vitals reviewed.        Diagnosis       1. Skin infection          Assessment/ Plan     Skin infection  -     doxycycline (MONODOX) 100 MG capsule; Take 1 capsule (100 mg total) by mouth 2 (two) times daily. for 10 days  Dispense: 20 capsule; Refill: 0      Local warm compresses as directed.  To ED if s/s worsen over next 2 to 3 days.  Contact Dr. Patricia if needed.  Follow-up in clinic as needed.        Patient Care Team:  Evan Bernard MD as PCP - General (Internal Medicine)  Christiano Tineo LPN as Care Coordinator (Internal Medicine)      JARED Valdes  [facility]

## 2019-10-04 ENCOUNTER — OFFICE VISIT (OUTPATIENT)
Dept: FAMILY MEDICINE | Facility: CLINIC | Age: 84
End: 2019-10-04
Payer: MEDICARE

## 2019-10-04 VITALS
BODY MASS INDEX: 19.84 KG/M2 | HEART RATE: 79 BPM | TEMPERATURE: 98 F | HEIGHT: 63 IN | OXYGEN SATURATION: 97 % | WEIGHT: 112 LBS | SYSTOLIC BLOOD PRESSURE: 156 MMHG | DIASTOLIC BLOOD PRESSURE: 80 MMHG

## 2019-10-04 DIAGNOSIS — L08.9 SKIN INFECTION: Primary | ICD-10-CM

## 2019-10-04 PROCEDURE — 99999 PR PBB SHADOW E&M-EST. PATIENT-LVL III: CPT | Mod: PBBFAC,HCNC,, | Performed by: REGISTERED NURSE

## 2019-10-04 PROCEDURE — 99999 PR PBB SHADOW E&M-EST. PATIENT-LVL III: ICD-10-PCS | Mod: PBBFAC,HCNC,, | Performed by: REGISTERED NURSE

## 2019-10-04 PROCEDURE — 99213 OFFICE O/P EST LOW 20 MIN: CPT | Mod: HCNC,S$GLB,, | Performed by: REGISTERED NURSE

## 2019-10-04 PROCEDURE — 1101F PT FALLS ASSESS-DOCD LE1/YR: CPT | Mod: HCNC,CPTII,S$GLB, | Performed by: REGISTERED NURSE

## 2019-10-04 PROCEDURE — 1101F PR PT FALLS ASSESS DOC 0-1 FALLS W/OUT INJ PAST YR: ICD-10-PCS | Mod: HCNC,CPTII,S$GLB, | Performed by: REGISTERED NURSE

## 2019-10-04 PROCEDURE — 99213 PR OFFICE/OUTPT VISIT, EST, LEVL III, 20-29 MIN: ICD-10-PCS | Mod: HCNC,S$GLB,, | Performed by: REGISTERED NURSE

## 2019-10-04 RX ORDER — DOXYCYCLINE 100 MG/1
100 CAPSULE ORAL 2 TIMES DAILY
Qty: 20 CAPSULE | Refills: 0 | Status: SHIPPED | OUTPATIENT
Start: 2019-10-04 | End: 2019-10-14

## 2019-10-14 DIAGNOSIS — L08.9 SKIN INFECTION: ICD-10-CM

## 2019-10-14 RX ORDER — DOXYCYCLINE 100 MG/1
100 CAPSULE ORAL 2 TIMES DAILY
Qty: 20 CAPSULE | Refills: 0 | OUTPATIENT
Start: 2019-10-14 | End: 2019-10-24

## 2019-10-14 NOTE — TELEPHONE ENCOUNTER
----- Message from Margarita Olsen sent at 10/14/2019 11:45 AM CDT -----  Contact: pt   Type:  RX Refill Request    Who Called: Francie Olsen  Refill or New Rx:refill;  RX Name and Strength:doxycycline (MONODOX) 100 MG capsule   How is the patient currently taking it? (ex. 1XDay):  Is this a 30 day or 90 day RX:  Preferred Pharmacy with phone number:   [id number] - [place], [place] - [address] [facility] [address]  [facility] [address]  [facility] [address]  Phone: [phone] Fax: [phone]  Local or Mail Order:local   Ordering Provider: Alessandro   Would the patient rather a call back or a response via [facility]? Call back   Best Call Back Number:[phone] (home)   Additional Information:

## 2019-10-15 ENCOUNTER — TELEPHONE (OUTPATIENT)
Dept: FAMILY MEDICINE | Facility: CLINIC | Age: 84
End: 2019-10-15

## 2019-10-15 NOTE — TELEPHONE ENCOUNTER
"Returned call to pt to inquire about ABT refill. States she has completed Rx continues to experience soreness and a small eruption present. States, "I don't think it did the trick." Pharmacy verified.     Please advise.   "

## 2019-10-15 NOTE — TELEPHONE ENCOUNTER
Using antibiotic ointment and applying warm compresses?  Running fever or having chills?  Skin drainage?

## 2019-10-15 NOTE — TELEPHONE ENCOUNTER
----- Message from Neeta Butts sent at 10/15/2019 10:08 AM CDT -----  ..Type:  RX Refill Request    Who Called: patient  Refill or New Rx:refill  RX Name and Strength: doxyxyxline  How is the patient currently taking it? (ex. 1XDay):  Is this a 30 day or 90 day RX:  Preferred Pharmacy with phone number:..  [id number] - [place], [place] - [address] AT [facility] & [facility] [address]  [address]  [facility] [address]  Phone: [phone] Fax: [phone]      Local or Mail Order:local  Ordering Provider:  Would the patient rather a call back or a response via [facility]?   Best Call Back Number:..[phone] (home)     Additional Information: patient has infection and says she's been waiting on rx

## 2019-10-16 ENCOUNTER — TELEPHONE (OUTPATIENT)
Dept: FAMILY MEDICINE | Facility: CLINIC | Age: 84
End: 2019-10-16

## 2019-10-16 NOTE — TELEPHONE ENCOUNTER
----- Message from Cedric Alejandro sent at 10/16/2019 10:00 AM CDT -----  Contact: PT   Type:  Needs Medical Advice    Who Called:Pt   Symptoms (please be specific): possible infection    How long has patient had these symptoms: last few weeks  Pharmacy name and phone #:  [facility] on [address]   Would the patient rather a call back or a response via [facility]? Call back   Best Call Back Number: [phone] (home)   Additional Information: n/a

## 2019-10-16 NOTE — TELEPHONE ENCOUNTER
----- Message from Berta Raymundo sent at 10/16/2019  1:50 PM CDT -----  Contact: Son-Marquez Zayas is requesting call back in regards to questions about medication refill on medication that's not listed.        Pls call back at [phone]

## 2019-10-16 NOTE — TELEPHONE ENCOUNTER
Needs an appt to be rechecked --- may be something going on internally.  Can see PCP or myself but not sure of the schedule.

## 2019-10-17 ENCOUNTER — OFFICE VISIT (OUTPATIENT)
Dept: FAMILY MEDICINE | Facility: CLINIC | Age: 84
End: 2019-10-17
Payer: MEDICARE

## 2019-10-17 VITALS
HEIGHT: 63 IN | SYSTOLIC BLOOD PRESSURE: 138 MMHG | WEIGHT: 107.69 LBS | OXYGEN SATURATION: 92 % | BODY MASS INDEX: 19.08 KG/M2 | DIASTOLIC BLOOD PRESSURE: 88 MMHG | TEMPERATURE: 98 F | HEART RATE: 100 BPM

## 2019-10-17 DIAGNOSIS — L08.9 SKIN INFECTION: Primary | ICD-10-CM

## 2019-10-17 PROCEDURE — 99999 PR PBB SHADOW E&M-EST. PATIENT-LVL III: CPT | Mod: PBBFAC,HCNC,, | Performed by: INTERNAL MEDICINE

## 2019-10-17 PROCEDURE — 1101F PT FALLS ASSESS-DOCD LE1/YR: CPT | Mod: HCNC,CPTII,S$GLB, | Performed by: INTERNAL MEDICINE

## 2019-10-17 PROCEDURE — 99213 OFFICE O/P EST LOW 20 MIN: CPT | Mod: HCNC,S$GLB,, | Performed by: INTERNAL MEDICINE

## 2019-10-17 PROCEDURE — 1101F PR PT FALLS ASSESS DOC 0-1 FALLS W/OUT INJ PAST YR: ICD-10-PCS | Mod: HCNC,CPTII,S$GLB, | Performed by: INTERNAL MEDICINE

## 2019-10-17 PROCEDURE — 99999 PR PBB SHADOW E&M-EST. PATIENT-LVL III: ICD-10-PCS | Mod: PBBFAC,HCNC,, | Performed by: INTERNAL MEDICINE

## 2019-10-17 PROCEDURE — 99213 PR OFFICE/OUTPT VISIT, EST, LEVL III, 20-29 MIN: ICD-10-PCS | Mod: HCNC,S$GLB,, | Performed by: INTERNAL MEDICINE

## 2019-10-17 RX ORDER — LEVOFLOXACIN 500 MG/1
500 TABLET, FILM COATED ORAL DAILY
Qty: 10 TABLET | Refills: 0 | Status: SHIPPED | OUTPATIENT
Start: 2019-10-17 | End: 2019-10-27

## 2019-10-17 NOTE — PROGRESS NOTES
Subjective:       Patient ID: Francie Olsen is a 89 y.o. female.    Chief Complaint: hip implant (possible infected)    Continues with pain at site of pain implant--------concerned about infection-    Past Medical History:   Diagnosis Date    Chronic low back pain     Chronic mid back pain     Familial tremor     Hearing loss     Hyperlipemia     Hypertension     Osteoarthritis     Osteoporosis, unspecified     SCC (squamous cell carcinoma), hand 2015    hand    Squamous cell carcinoma 9-10yrs ago    left ear     Past Surgical History:   Procedure Laterality Date    CATARACT EXTRACTION      FEMUR FRACTURE SURGERY Right     HIP FRACTURE SURGERY Left aprox 2010    implant for nerve pain      YAG OU       Family History   Problem Relation Age of Onset    Cancer Mother          54 uterine    Diabetes Son     Blindness Neg Hx     Cataracts Neg Hx     Glaucoma Neg Hx     Hypertension Neg Hx     Macular degeneration Neg Hx     Retinal detachment Neg Hx     Strabismus Neg Hx     Stroke Neg Hx     Thyroid disease Neg Hx     Melanoma Neg Hx      Social History     Socioeconomic History    Marital status:      Spouse name: Not on file    Number of children: Not on file    Years of education: Not on file    Highest education level: Not on file   Occupational History    Not on file   Social Needs    Financial resource strain: Not on file    Food insecurity:     Worry: Not on file     Inability: Not on file    Transportation needs:     Medical: Not on file     Non-medical: Not on file   Tobacco Use    Smoking status: Former Smoker    Smokeless tobacco: Never Used   Substance and Sexual Activity    Alcohol use: No     Alcohol/week: 4.0 standard drinks     Types: 4 Shots of liquor per week    Drug use: No    Sexual activity: Never     Birth control/protection: Post-menopausal   Lifestyle    Physical activity:     Days per week: Not on file     Minutes per session: Not on file     Stress: Not at all   Relationships    Social connections:     Talks on phone: Not on file     Gets together: Not on file     Attends [employer]ism service: Not on file     Active member of club or organization: Not on file     Attends meetings of clubs or organizations: Not on file     Relationship status: Not on file   Other Topics Concern    Are you pregnant or think you may be? No    Breast-feeding No   Social History Narrative    Not on file     Review of Systems   Constitutional: Negative for chills and fever.   HENT: Negative.    Respiratory: Negative for apnea, cough, choking, chest tightness, shortness of breath, wheezing and stridor.    Cardiovascular: Negative for chest pain and palpitations.   Gastrointestinal: Negative for abdominal pain, nausea and vomiting.   Genitourinary: Negative.    Neurological: Negative.        Objective:      Physical Exam   Cardiovascular: Normal rate, regular rhythm, normal heart sounds and intact distal pulses.   Pulmonary/Chest: Effort normal and breath sounds normal.   Abdominal: Soft. Bowel sounds are normal.   Skin:   Minimal erythema at incision site of implant----------no drainage -no warmth--------   Nursing note and vitals reviewed.      CMP  Sodium   Date Value Ref Range Status   12/07/2016 135 (L) 136 - 145 mmol/L Final     Potassium   Date Value Ref Range Status   12/07/2016 4.6 3.5 - 5.1 mmol/L Final     Chloride   Date Value Ref Range Status   12/07/2016 102 95 - 110 mmol/L Final     CO2   Date Value Ref Range Status   12/07/2016 25 23 - 29 mmol/L Final     Glucose   Date Value Ref Range Status   12/07/2016 108 70 - 110 mg/dL Final     BUN, Bld   Date Value Ref Range Status   12/07/2016 20 8 - 23 mg/dL Final     Creatinine   Date Value Ref Range Status   12/07/2016 0.9 0.5 - 1.4 mg/dL Final     Calcium   Date Value Ref Range Status   12/07/2016 9.7 8.7 - 10.5 mg/dL Final     Total Protein   Date Value Ref Range Status   12/07/2016 7.3 6.0 - 8.4 g/dL Final      Albumin   Date Value Ref Range Status   12/07/2016 3.7 3.5 - 5.2 g/dL Final     Total Bilirubin   Date Value Ref Range Status   12/07/2016 0.4 0.1 - 1.0 mg/dL Final     Comment:     For infants and newborns, interpretation of results should be based  on gestational age, weight and in agreement with clinical  observations.  Premature Infant recommended reference ranges:  Up to 24 hours.............<8.0 mg/dL  Up to 48 hours............<12.0 mg/dL  3-5 days..................<15.0 mg/dL  6-29 days.................<15.0 mg/dL       Alkaline Phosphatase   Date Value Ref Range Status   12/07/2016 101 55 - 135 U/L Final     AST   Date Value Ref Range Status   12/07/2016 18 10 - 40 U/L Final     ALT   Date Value Ref Range Status   12/07/2016 12 10 - 44 U/L Final     Anion Gap   Date Value Ref Range Status   12/07/2016 8 8 - 16 mmol/L Final     eGFR if    Date Value Ref Range Status   12/07/2016 >60.0 >60 mL/min/1.73 m^2 Final     eGFR if non    Date Value Ref Range Status   12/07/2016 56.9 (A) >60 mL/min/1.73 m^2 Final     Comment:     Calculation used to obtain the estimated glomerular filtration  rate (eGFR) is the CKD-EPI equation. Since race is unknown   in our information system, the eGFR values for   -American and Non--American patients are given   for each creatinine result.       Lab Results   Component Value Date    WBC 8.72 12/07/2016    HGB 12.8 12/07/2016    HCT 39.6 12/07/2016    MCV 97 12/07/2016     12/07/2016     Lab Results   Component Value Date    CHOL 149 08/10/2015     Lab Results   Component Value Date    HDL 66 08/10/2015     Lab Results   Component Value Date    LDLCALC 62.0 (L) 08/10/2015     Lab Results   Component Value Date    TRIG 105 08/10/2015     Lab Results   Component Value Date    CHOLHDL 44.3 08/10/2015     Lab Results   Component Value Date    TSH 0.782 02/13/2012     No results found for: LABA1C, HGBA1C  Assessment:       1. Skin  infection        Plan:   Skin infection----------minimal----------will treat with levaquin---------declines f/u with surgery-------    Other orders  -     levoFLOXacin (LEVAQUIN) 500 MG tablet; Take 1 tablet (500 mg total) by mouth once daily. for 10 days  Dispense: 10 tablet; Refill: 0    F/u neurosurgery if symptoms persists-------------

## 2019-11-05 ENCOUNTER — OFFICE VISIT (OUTPATIENT)
Dept: OPHTHALMOLOGY | Facility: CLINIC | Age: 84
End: 2019-11-05
Payer: MEDICARE

## 2019-11-05 DIAGNOSIS — H35.3132 INTERMEDIATE STAGE NONEXUDATIVE AGE-RELATED MACULAR DEGENERATION OF BOTH EYES: ICD-10-CM

## 2019-11-05 DIAGNOSIS — H18.20 CORNEAL EDEMA OF RIGHT EYE: ICD-10-CM

## 2019-11-05 DIAGNOSIS — Z96.1 PSEUDOPHAKIA: Primary | ICD-10-CM

## 2019-11-05 PROCEDURE — 92014 PR EYE EXAM, EST PATIENT,COMPREHESV: ICD-10-PCS | Mod: HCNC,S$GLB,, | Performed by: OPHTHALMOLOGY

## 2019-11-05 PROCEDURE — 92014 COMPRE OPH EXAM EST PT 1/>: CPT | Mod: HCNC,S$GLB,, | Performed by: OPHTHALMOLOGY

## 2019-11-05 PROCEDURE — 99999 PR PBB SHADOW E&M-EST. PATIENT-LVL II: CPT | Mod: PBBFAC,,, | Performed by: OPHTHALMOLOGY

## 2019-11-05 PROCEDURE — 99999 PR PBB SHADOW E&M-EST. PATIENT-LVL II: ICD-10-PCS | Mod: PBBFAC,,, | Performed by: OPHTHALMOLOGY

## 2019-11-06 ENCOUNTER — OFFICE VISIT (OUTPATIENT)
Dept: OPHTHALMOLOGY | Facility: CLINIC | Age: 84
End: 2019-11-06
Payer: MEDICARE

## 2019-11-06 DIAGNOSIS — H35.3132 INTERMEDIATE STAGE NONEXUDATIVE AGE-RELATED MACULAR DEGENERATION OF BOTH EYES: Primary | ICD-10-CM

## 2019-11-06 DIAGNOSIS — H18.20 CORNEAL EDEMA OF RIGHT EYE: ICD-10-CM

## 2019-11-06 PROCEDURE — 99999 PR PBB SHADOW E&M-EST. PATIENT-LVL II: ICD-10-PCS | Mod: PBBFAC,HCNC,, | Performed by: OPHTHALMOLOGY

## 2019-11-06 PROCEDURE — 92134 CPTRZ OPH DX IMG PST SGM RTA: CPT | Mod: HCNC,S$GLB,, | Performed by: OPHTHALMOLOGY

## 2019-11-06 PROCEDURE — 99499 NO LOS: ICD-10-PCS | Mod: HCNC,S$GLB,, | Performed by: OPHTHALMOLOGY

## 2019-11-06 PROCEDURE — 99499 UNLISTED E&M SERVICE: CPT | Mod: HCNC,S$GLB,, | Performed by: OPHTHALMOLOGY

## 2019-11-06 PROCEDURE — 92134 POSTERIOR SEGMENT OCT RETINA (OCULAR COHERENCE TOMOGRAPHY)-BOTH EYES: ICD-10-PCS | Mod: HCNC,S$GLB,, | Performed by: OPHTHALMOLOGY

## 2019-11-06 PROCEDURE — 99999 PR PBB SHADOW E&M-EST. PATIENT-LVL II: CPT | Mod: PBBFAC,HCNC,, | Performed by: OPHTHALMOLOGY

## 2019-11-06 NOTE — PROGRESS NOTES
SUBJECTIVE:   Francie Olsen is a 89 y.o. female   Visual acuity was not recorded.   Chief Complaint   Patient presents with    Follow-up     MOCT         HPI:  HPI     Follow-up      Additional comments: MOCT               Comments     Pt seen yesterday for full exam at [facility], here to day for MOCT only           Last edited by Manuela Webb MA on 11/6/2019  1:54 PM. (History)        Assessment /Plan :  1. Intermediate stage nonexudative age-related macular degeneration of both eyes monitor for now   2. Corneal edema of right eye continue Benja 128 5% ophthalmic gtts QID OD     RTC in 3 months to evaluate the cornea

## 2020-01-02 ENCOUNTER — PATIENT OUTREACH (OUTPATIENT)
Dept: ADMINISTRATIVE | Facility: HOSPITAL | Age: 85
End: 2020-01-02

## 2020-01-02 NOTE — PROGRESS NOTES
PreVisit Chart Audit Performed         Camelia SOTO LPN Care Coordinator  Care Coordination Department  [facility]  [phone]

## 2020-01-03 ENCOUNTER — OFFICE VISIT (OUTPATIENT)
Dept: FAMILY MEDICINE | Facility: CLINIC | Age: 85
End: 2020-01-03
Payer: MEDICARE

## 2020-01-03 VITALS
BODY MASS INDEX: 19.96 KG/M2 | SYSTOLIC BLOOD PRESSURE: 175 MMHG | HEIGHT: 63 IN | WEIGHT: 112.63 LBS | HEART RATE: 73 BPM | DIASTOLIC BLOOD PRESSURE: 96 MMHG | TEMPERATURE: 99 F | OXYGEN SATURATION: 97 %

## 2020-01-03 DIAGNOSIS — I77.1 TORTUOUS AORTA: ICD-10-CM

## 2020-01-03 DIAGNOSIS — I10 ESSENTIAL HYPERTENSION: ICD-10-CM

## 2020-01-03 DIAGNOSIS — M51.34 DDD (DEGENERATIVE DISC DISEASE), THORACIC: Primary | ICD-10-CM

## 2020-01-03 PROCEDURE — 1159F PR MEDICATION LIST DOCUMENTED IN MEDICAL RECORD: ICD-10-PCS | Mod: HCNC,S$GLB,, | Performed by: FAMILY MEDICINE

## 2020-01-03 PROCEDURE — 99499 UNLISTED E&M SERVICE: CPT | Mod: HCNC,S$GLB,, | Performed by: FAMILY MEDICINE

## 2020-01-03 PROCEDURE — 99999 PR PBB SHADOW E&M-EST. PATIENT-LVL III: CPT | Mod: PBBFAC,HCNC,, | Performed by: FAMILY MEDICINE

## 2020-01-03 PROCEDURE — 1126F AMNT PAIN NOTED NONE PRSNT: CPT | Mod: HCNC,S$GLB,, | Performed by: FAMILY MEDICINE

## 2020-01-03 PROCEDURE — 1100F PTFALLS ASSESS-DOCD GE2>/YR: CPT | Mod: HCNC,CPTII,S$GLB, | Performed by: FAMILY MEDICINE

## 2020-01-03 PROCEDURE — 99499 RISK ADDL DX/OHS AUDIT: ICD-10-PCS | Mod: HCNC,S$GLB,, | Performed by: FAMILY MEDICINE

## 2020-01-03 PROCEDURE — 99214 OFFICE O/P EST MOD 30 MIN: CPT | Mod: HCNC,S$GLB,, | Performed by: FAMILY MEDICINE

## 2020-01-03 PROCEDURE — 99999 PR PBB SHADOW E&M-EST. PATIENT-LVL III: ICD-10-PCS | Mod: PBBFAC,HCNC,, | Performed by: FAMILY MEDICINE

## 2020-01-03 PROCEDURE — 1159F MED LIST DOCD IN RCRD: CPT | Mod: HCNC,S$GLB,, | Performed by: FAMILY MEDICINE

## 2020-01-03 PROCEDURE — 99214 PR OFFICE/OUTPT VISIT, EST, LEVL IV, 30-39 MIN: ICD-10-PCS | Mod: HCNC,S$GLB,, | Performed by: FAMILY MEDICINE

## 2020-01-03 PROCEDURE — 1100F PR PT FALLS ASSESS DOC 2+ FALLS/FALL W/INJURY/YR: ICD-10-PCS | Mod: HCNC,CPTII,S$GLB, | Performed by: FAMILY MEDICINE

## 2020-01-03 PROCEDURE — 3288F PR FALLS RISK ASSESSMENT DOCUMENTED: ICD-10-PCS | Mod: HCNC,CPTII,S$GLB, | Performed by: FAMILY MEDICINE

## 2020-01-03 PROCEDURE — 1126F PR PAIN SEVERITY QUANTIFIED, NO PAIN PRESENT: ICD-10-PCS | Mod: HCNC,S$GLB,, | Performed by: FAMILY MEDICINE

## 2020-01-03 PROCEDURE — 3288F FALL RISK ASSESSMENT DOCD: CPT | Mod: HCNC,CPTII,S$GLB, | Performed by: FAMILY MEDICINE

## 2020-01-03 RX ORDER — OXYCODONE AND ACETAMINOPHEN 7.5; 325 MG/1; MG/1
1 TABLET ORAL DAILY PRN
Qty: 30 TABLET | Refills: 0 | Status: SHIPPED | OUTPATIENT
Start: 2020-01-03 | End: 2020-03-02 | Stop reason: SDUPTHER

## 2020-01-03 NOTE — PROGRESS NOTES
Francie Olsen    Chief Complaint   Patient presents with    Back Pain       History of Present Illness:   Ms. Olsen comes in today accompanied by her son with complaint of having chronic, intermittent back pain. She states she fell 3 years ago at [employer] as she sat down hard.  She states since then she has had many ER visits with x-rays and scans performed with nothing found.  However, she does mention she has been given with injections into her back years ago with help.  She states she sits on heating pad with help and states she takes Percocet when pain is really bad.  Per chart, she was last prescribed Percocet for back pain on January 8, 2018 by PCP Dr. Evan Bernard.    She describes pain as intermittent across her spine but today spasms at her left lower back.    She denies having associated radiating numbness, tingling and pain into her lower extremities; bowel or bladder incontinence; fever, chills, fatigue, appetite changes; shortness of breath, cough, wheezing; chest pain, palpitations, leg swelling; abdominal pain, nausea, vomiting, diarrhea, constipation; unusual urinary symptoms; headaches; anxiety, depression, homicidal or suicidal thoughts.    Her son states she lives alone and does most of her ADLs; however, he states she does have some help to assist her in her home.    She requests refill of Percocet.  She also states she is interested in pain management consultation with Dr. Lonnie Galindo.    She is followed by PCP Dr. Evan Bernard.        Current Outpatient Medications   Medication Sig    back brace Misc 1 Device by Misc.(Non-Drug; Combo Route) route daily as needed.    calcium carbonate (TUMS) 300 mg (750 mg) Chew Take 750 mg by mouth Twice daily. 1 Tablet, Chewable Oral Twice a day     lifitegrast (XIIDRA) 5 % Dpet Place 1 drop into both eyes 2 (two) times daily.    metoprolol succinate (TOPROL-XL) 50 MG 24 hr tablet TAKE 1 TABLET EVERY DAY    Multi-Vitamin tablet  Take 1 tablet by mouth Daily. 1 Tablet Oral Every day    NAPHAZOLINE HCL (CLEAR EYES OPHT) Apply to eye.       Review of Systems   Constitutional: Negative for appetite change, chills, fatigue and fever.   Respiratory: Negative for cough, shortness of breath and wheezing.    Cardiovascular: Negative for chest pain, palpitations and leg swelling.   Gastrointestinal: Negative for abdominal pain, constipation, diarrhea, nausea and vomiting.   Genitourinary: Negative for difficulty urinating.   Musculoskeletal: Positive for back pain.   Neurological: Negative for numbness and headaches.   Psychiatric/Behavioral: Negative for dysphoric mood and suicidal ideas. The patient is not nervous/anxious.         Negative for homicidal ideas.       Objective:  Physical Exam   Constitutional: She is oriented to person, place, and time. She appears well-developed and well-nourished. No distress.   Neck: Normal range of motion. Neck supple. Carotid bruit is not present.   Cardiovascular: Normal rate, regular rhythm, normal heart sounds and intact distal pulses.   Pulmonary/Chest: Effort normal and breath sounds normal. No respiratory distress. She has no wheezes. She has no rales. She exhibits no tenderness.   Abdominal: Soft. Bowel sounds are normal. She exhibits no distension. There is no tenderness.   Musculoskeletal: Normal range of motion. She exhibits no edema or tenderness.   Non tender spine with full range of motion noted. Slight curved posture of back noted. She is ambulatory without problems today.   Neurological: She is alert and oriented to person, place, and time.   Skin: Skin is warm and dry. No rash noted. She is not diaphoretic. No erythema. No pallor.   Psychiatric: She has a normal mood and affect. Her behavior is normal. Judgment and thought content normal.   Nursing note and vitals reviewed.      ASSESSMENT:  1. DDD (degenerative disc disease), thoracic    2. Essential hypertension    3. Tortuous aorta   "      PLAN:  Virginia was seen today for back pain.    Diagnoses and all orders for this visit:    DDD (degenerative disc disease), thoracic  -     oxyCODONE-acetaminophen (PERCOCET) 7.5-325 mg per tablet; Take 1 tablet by mouth daily as needed for Pain.    Essential hypertension    Tortuous aorta       Continue current medications, follow low sodium, low cholesterol, low carb diet, daily walks.  Prescription refill as noted above.  Also advised patient and son to pain management consultation is appropriate; referral can be given if they can provide full contact information for "Dr. Galindo."  Follow up if symptoms worsen or fail to improve.      "

## 2020-01-16 ENCOUNTER — TELEPHONE (OUTPATIENT)
Dept: FAMILY MEDICINE | Facility: CLINIC | Age: 85
End: 2020-01-16

## 2020-01-16 DIAGNOSIS — M54.50 CHRONIC MIDLINE LOW BACK PAIN WITHOUT SCIATICA: Primary | ICD-10-CM

## 2020-01-16 DIAGNOSIS — G89.29 CHRONIC MIDLINE LOW BACK PAIN WITHOUT SCIATICA: Primary | ICD-10-CM

## 2020-01-16 NOTE — TELEPHONE ENCOUNTER
----- Message from Neeta Butts sent at 1/16/2020 10:57 AM CST -----  Caller returning call.[phone] (home)

## 2020-01-16 NOTE — TELEPHONE ENCOUNTER
Patient son states his mother back problems are worse. They would like a referral to Dr. Sandip Galindo on [facility]. [phone]

## 2020-01-16 NOTE — TELEPHONE ENCOUNTER
----- Message from Sharyn Hagan sent at 1/16/2020  9:51 AM CST -----  Contact: Herve - Son  Request a call form Marisol concerning a referral for this pt, no additional info given and can be reached at [phone]///[id number]

## 2020-02-06 ENCOUNTER — OFFICE VISIT (OUTPATIENT)
Dept: OPHTHALMOLOGY | Facility: CLINIC | Age: 85
End: 2020-02-06
Payer: MEDICARE

## 2020-02-06 DIAGNOSIS — H18.20 CORNEAL EDEMA OF RIGHT EYE: Primary | ICD-10-CM

## 2020-02-06 DIAGNOSIS — Z96.1 PSEUDOPHAKIA: ICD-10-CM

## 2020-02-06 PROCEDURE — 99999 PR PBB SHADOW E&M-EST. PATIENT-LVL II: ICD-10-PCS | Mod: PBBFAC,HCNC,, | Performed by: OPHTHALMOLOGY

## 2020-02-06 PROCEDURE — 99999 PR PBB SHADOW E&M-EST. PATIENT-LVL II: CPT | Mod: PBBFAC,HCNC,, | Performed by: OPHTHALMOLOGY

## 2020-02-06 PROCEDURE — 92012 PR EYE EXAM, EST PATIENT,INTERMED: ICD-10-PCS | Mod: HCNC,S$GLB,, | Performed by: OPHTHALMOLOGY

## 2020-02-06 PROCEDURE — 92012 INTRM OPH EXAM EST PATIENT: CPT | Mod: HCNC,S$GLB,, | Performed by: OPHTHALMOLOGY

## 2020-02-06 NOTE — PROGRESS NOTES
SUBJECTIVE  Virginia NORA Olsen is 89 y.o. female  Corrected distance visual acuity was 20/80 in the right eye and 20/30 -1 in the left eye.   Chief Complaint   Patient presents with    Follow-up     3 month follow up          HPI     Follow-up      Additional comments: 3 month follow up              Comments     Patient feels OU is doing well, at times OD feels irritated and finds the   Benja 128 burns when instilling the drop but states using as directed.        1. PCIOL OU  2. YAG OU  3. Dry AMD OS>OD  4. Dry Eyes  5. Refractive Error    OD: Benja 128 drop QD          Last edited by Jolene Pascual, Patient Care Assistant on 2/6/2020  9:34   AM. (History)         Assessment /Plan :  1. Corneal edema of right eye improved, continue the Benja 128 eyedrops, could consult Dr. Vega in the future, patient wants to wait for a consult at this time   2. Pseudophakia  -- Condition stable, no therapeutic change required. Monitoring routinely.       Return to clinic in 9 months for dilation  or as needed

## 2020-02-28 ENCOUNTER — TELEPHONE (OUTPATIENT)
Dept: FAMILY MEDICINE | Facility: CLINIC | Age: 85
End: 2020-02-28

## 2020-02-28 NOTE — TELEPHONE ENCOUNTER
----- Message from Ewa Edward sent at 2/28/2020 11:52 AM CST -----  Contact: son/Herve  Type:  Sooner Apoointment Request    Caller is requesting a sooner appointment.  Caller declined first available appointment listed below.  Caller will not accept being placed on the waitlist and is requesting a message be sent to doctor.  Name of Caller:Herve  When is the first available appointment?4/7  Symptoms:#back pain/update for back medication  Would the patient rather a call back or a response via [facility]?call back#  Best Call Back Number:[phone]  Additional Information: na

## 2020-03-02 ENCOUNTER — OFFICE VISIT (OUTPATIENT)
Dept: FAMILY MEDICINE | Facility: CLINIC | Age: 85
End: 2020-03-02
Payer: MEDICARE

## 2020-03-02 VITALS
OXYGEN SATURATION: 95 % | TEMPERATURE: 98 F | WEIGHT: 110.81 LBS | BODY MASS INDEX: 19.63 KG/M2 | HEIGHT: 63 IN | SYSTOLIC BLOOD PRESSURE: 141 MMHG | DIASTOLIC BLOOD PRESSURE: 69 MMHG | HEART RATE: 82 BPM

## 2020-03-02 DIAGNOSIS — M81.0 OSTEOPOROSIS, UNSPECIFIED OSTEOPOROSIS TYPE, UNSPECIFIED PATHOLOGICAL FRACTURE PRESENCE: ICD-10-CM

## 2020-03-02 DIAGNOSIS — G89.29 CHRONIC LEFT-SIDED LOW BACK PAIN WITHOUT SCIATICA: ICD-10-CM

## 2020-03-02 DIAGNOSIS — S32.000S COMPRESSION FRACTURE OF LUMBAR VERTEBRA, UNSPECIFIED LUMBAR VERTEBRAL LEVEL, SEQUELA: ICD-10-CM

## 2020-03-02 DIAGNOSIS — M51.34 DDD (DEGENERATIVE DISC DISEASE), THORACIC: ICD-10-CM

## 2020-03-02 DIAGNOSIS — M54.50 CHRONIC LEFT-SIDED LOW BACK PAIN WITHOUT SCIATICA: ICD-10-CM

## 2020-03-02 PROCEDURE — 1125F PR PAIN SEVERITY QUANTIFIED, PAIN PRESENT: ICD-10-PCS | Mod: HCNC,S$GLB,, | Performed by: FAMILY MEDICINE

## 2020-03-02 PROCEDURE — 1101F PR PT FALLS ASSESS DOC 0-1 FALLS W/OUT INJ PAST YR: ICD-10-PCS | Mod: HCNC,CPTII,S$GLB, | Performed by: FAMILY MEDICINE

## 2020-03-02 PROCEDURE — 99999 PR PBB SHADOW E&M-EST. PATIENT-LVL III: CPT | Mod: PBBFAC,HCNC,, | Performed by: FAMILY MEDICINE

## 2020-03-02 PROCEDURE — 99213 PR OFFICE/OUTPT VISIT, EST, LEVL III, 20-29 MIN: ICD-10-PCS | Mod: HCNC,S$GLB,, | Performed by: FAMILY MEDICINE

## 2020-03-02 PROCEDURE — 1159F PR MEDICATION LIST DOCUMENTED IN MEDICAL RECORD: ICD-10-PCS | Mod: HCNC,S$GLB,, | Performed by: FAMILY MEDICINE

## 2020-03-02 PROCEDURE — 1159F MED LIST DOCD IN RCRD: CPT | Mod: HCNC,S$GLB,, | Performed by: FAMILY MEDICINE

## 2020-03-02 PROCEDURE — 1101F PT FALLS ASSESS-DOCD LE1/YR: CPT | Mod: HCNC,CPTII,S$GLB, | Performed by: FAMILY MEDICINE

## 2020-03-02 PROCEDURE — 99999 PR PBB SHADOW E&M-EST. PATIENT-LVL III: ICD-10-PCS | Mod: PBBFAC,HCNC,, | Performed by: FAMILY MEDICINE

## 2020-03-02 PROCEDURE — 99213 OFFICE O/P EST LOW 20 MIN: CPT | Mod: HCNC,S$GLB,, | Performed by: FAMILY MEDICINE

## 2020-03-02 PROCEDURE — 1125F AMNT PAIN NOTED PAIN PRSNT: CPT | Mod: HCNC,S$GLB,, | Performed by: FAMILY MEDICINE

## 2020-03-02 RX ORDER — OXYCODONE AND ACETAMINOPHEN 7.5; 325 MG/1; MG/1
1 TABLET ORAL DAILY PRN
Qty: 14 TABLET | Refills: 0 | Status: SHIPPED | OUTPATIENT
Start: 2020-03-02 | End: 2021-11-23 | Stop reason: SDUPTHER

## 2020-03-02 NOTE — PROGRESS NOTES
Francie Olsen    Chief Complaint   Patient presents with    Back Pain       History of Present Illness:   Ms. Olsen comes in today with complaint of having back pain today.  She is accompanied by her son Marquez Olsen, who assists her with giving information.  He states she saw Dr. Satya Barahona, orthopedist at [facility]; he states Dr. Barahona ordered his mother to have a bone scan which showed spine DDD with fractures of the vertebra and ribs.  He states Dr. Barahona told them Ms. Mercado likely had osteoporosis and referred her to Dr. Jorgensen, anesthesiologist with another pain clinic, who treated her with MARCOS at her back and states MARCOS help.  He states she is scheduled to have another MARCOS at the end of next week with anesthesiologist Dr. An.  They request she have a refill of Percocet 7.5-25 mg (which she states she rarely takes) to help her with pain and to she has her MARCOS therapy.    She states she does not wear back brace.  She states pain interferes with standing and with activity.  She reports pain today at 7/10 on pain scale.    Otherwise, she denies having fever, chills, fatigue, appetite changes; shortness of breath, cough, wheezing; chest pain, palpitations, leg swelling; abdominal pain, nausea, vomiting, diarrhea, constipation; unusual urinary symptoms; headaches; anxiety, depression, homicidal or suicidal thoughts.      Current Outpatient Medications   Medication Sig    calcium carbonate (TUMS) 300 mg (750 mg) Chew Take 750 mg by mouth Twice daily. 1 Tablet, Chewable Oral Twice a day     lifitegrast (XIIDRA) 5 % Dpet Place 1 drop into both eyes 2 (two) times daily.    metoprolol succinate (TOPROL-XL) 50 MG 24 hr tablet TAKE 1 TABLET EVERY DAY    Multi-Vitamin tablet Take 1 tablet by mouth Daily. 1 Tablet Oral Every day    NAPHAZOLINE HCL (CLEAR EYES OPHT) Apply to eye.    oxyCODONE-acetaminophen (PERCOCET) 7.5-325 mg per tablet Take 1 tablet by mouth daily as needed for Pain.    sodium  chloride 2% (ABELARDO 128) 2 % ophthalmic solution 1 drop as needed.    back brace Misc 1 Device by Misc.(Non-Drug; Combo Route) route daily as needed. (Patient not taking: Reported on 2/6/2020)     Review of Systems   Constitutional: Negative for appetite change, chills, fatigue and fever.   Respiratory: Negative for cough, shortness of breath and wheezing.    Cardiovascular: Negative for chest pain, palpitations and leg swelling.   Gastrointestinal: Negative for abdominal pain, constipation, diarrhea, nausea and vomiting.   Genitourinary: Negative for difficulty urinating.   Musculoskeletal: Positive for back pain.   Neurological: Negative for headaches.   Psychiatric/Behavioral: Negative for dysphoric mood and suicidal ideas. The patient is not nervous/anxious.         Negative for homicidal ideas.       Objective:  Physical Exam   Constitutional: She is oriented to person, place, and time. She appears well-developed and well-nourished. No distress.   Neck: Normal range of motion. Neck supple. Carotid bruit is not present.   Cardiovascular: Normal rate, regular rhythm, normal heart sounds and intact distal pulses.   Pulmonary/Chest: Effort normal and breath sounds normal. No respiratory distress. She has no wheezes. She has no rales. She exhibits no tenderness.   Abdominal: Soft. Bowel sounds are normal. She exhibits no distension. There is no tenderness.   Musculoskeletal: Normal range of motion. She exhibits no edema or tenderness.   Slightly tender spine with full range of motion noted. Slight curved posture of back noted. She is ambulatory without problems today.   Neurological: She is alert and oriented to person, place, and time.   Skin: Skin is warm and dry. No rash noted. She is not diaphoretic. No erythema. No pallor.   Psychiatric: She has a normal mood and affect. Her behavior is normal. Judgment and thought content normal.   Nursing note and vitals reviewed.      ASSESSMENT:  1. Chronic left-sided low  back pain without sciatica    2. Compression fracture of lumbar vertebra, unspecified lumbar vertebral level, sequela    3. DDD (degenerative disc disease), thoracic    4. Osteoporosis, unspecified osteoporosis type, unspecified pathological fracture presence        PLAN:  [place]ia was seen today for back pain.    Diagnoses and all orders for this visit:    Chronic left-sided low back pain without sciatica  -     oxyCODONE-acetaminophen (PERCOCET) 7.5-325 mg per tablet; Take 1 tablet by mouth daily as needed for Pain.    Compression fracture of lumbar vertebra, unspecified lumbar vertebral level, sequela    DDD (degenerative disc disease), thoracic    Osteoporosis, unspecified osteoporosis type, unspecified pathological fracture presence       Prescription refill for Percocet 7.5-325 mg daily prn pain, #14, 0 refill - as noted above.  Continue current medications, follow low sodium, low cholesterol, low carb diet, daily walks.  Follow up if symptoms worsen or fail to improve.

## 2020-08-24 ENCOUNTER — HOSPITAL ENCOUNTER (EMERGENCY)
Facility: HOSPITAL | Age: 85
Discharge: HOME | End: 2020-08-24
Attending: EMERGENCY MEDICINE
Payer: MEDICARE

## 2020-08-24 VITALS
HEART RATE: 72 BPM | BODY MASS INDEX: 19.7 KG/M2 | DIASTOLIC BLOOD PRESSURE: 72 MMHG | HEIGHT: 63 IN | SYSTOLIC BLOOD PRESSURE: 136 MMHG | TEMPERATURE: 99 F | OXYGEN SATURATION: 97 % | RESPIRATION RATE: 18 BRPM | WEIGHT: 111.19 LBS

## 2020-08-24 DIAGNOSIS — M54.31 SCIATICA OF RIGHT SIDE: ICD-10-CM

## 2020-08-24 DIAGNOSIS — M25.551 PAIN OF RIGHT HIP JOINT: ICD-10-CM
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
## 2020-08-24 PROCEDURE — 99284 EMERGENCY DEPT VISIT MOD MDM: CPT | Mod: 25,HCNC

## 2020-08-24 RX ORDER — PREDNISONE 20 MG/1
20 TABLET ORAL DAILY
Qty: 5 TABLET | Refills: 0 | Status: SHIPPED | OUTPATIENT
Start: 2020-08-24 | End: 2020-08-29

## 2020-08-24 NOTE — ED NOTES
Patient placed on continuous cardiac monitor, automatic blood pressure cuff and continuous pulse oximeter.     Dennise Rivera, Patient Care Assistant  08/24/20 7476

## 2020-08-24 NOTE — ED NOTES
Patient identifiers verified and correct for Francie Olsen. Patient has complaints of right hip pain that radiates down to right shin for three days after falling upon exiting a vehicle. Patient able to ambulate with assistive device. Family member at bedside.    LOC: The patient is awake, alert and aware of environment with an appropriate affect, the patient is oriented x 3 and speaking appropriately.  APPEARANCE: Patient resting comfortably and in no acute distress, patient is clean and well groomed, patient's clothing is properly fastened.  SKIN: The skin is warm and dry, color consistent with ethnicity, patient has normal skin turgor and moist mucus membranes, skin intact, no breakdown or bruising noted.  MUSCULOSKELETAL: Patient moving all extremities spontaneously.  RESPIRATORY: Airway is open and patent, respirations are spontaneous.  CARDIAC: Patient has a normal rate, no periphreal edema noted, capillary refill < 3 seconds.  ABDOMEN: Soft and non tender to palpation.

## 2020-08-24 NOTE — ED PROVIDER NOTES
SCRIBE #1 NOTE: I, Luis Brown, am scribing for, and in the presence of, Dimas Magallon MD. I have scribed the entire note.      History      Chief Complaint   Patient presents with    Hip Pain     Arrives to ER complaining of right hip pain that started x3 days after fall, reports pain radiates down to shin. Able to ambulate with device, denies any other injuries.        Review of patient's allergies indicates:   Allergen Reactions    Codeine      Other reaction(s): Nausea    Demerol  [meperidine]      Other reaction(s): Nausea        HPI   HPI    8/24/2020, 8:00 AM   History obtained from the patient      History of Present Illness: Francie Olsen is a 89 y.o. female patient who presents to the Emergency Department for worsening R hip pain, onset 2 week PTA following a fall. Pt states that she fell while getting out of a car, landing on her R buttock. Pt reports that her pain radiates to her R buttock and down her RLE. Symptoms are constant and moderate in severity. No mitigating or exacerbating factors reported. Associated sxs include R ankle swelling. Patient denies any LOC, head trauma, back pain, fever, chills, n/v/d, SOB, CP, weakness, numbness, dizziness, headache, and all other sxs at this time. No prior Tx reported. No further complaints or concerns at this time.     Arrival mode: Personal vehicle    PCP: Evan Bernard MD       Past Medical History:  Past Medical History:   Diagnosis Date    Chronic low back pain     Chronic mid back pain     Familial tremor     Hearing loss     Hyperlipemia     Hypertension     Osteoarthritis     Osteoporosis, unspecified     SCC (squamous cell carcinoma), hand 2015    hand    Squamous cell carcinoma 9-10yrs ago    left ear       Past Surgical History:  Past Surgical History:   Procedure Laterality Date    CATARACT EXTRACTION      FEMUR FRACTURE SURGERY Right     HIP FRACTURE SURGERY Left aprox 2010    implant for nerve pain      YAG OU            Family History:  Family History   Problem Relation Age of Onset    Cancer Mother          54 uterine    Diabetes Son     Blindness Neg Hx     Cataracts Neg Hx     Glaucoma Neg Hx     Hypertension Neg Hx     Macular degeneration Neg Hx     Retinal detachment Neg Hx     Strabismus Neg Hx     Stroke Neg Hx     Thyroid disease Neg Hx     Melanoma Neg Hx        Social History:  Social History     Tobacco Use    Smoking status: Former Smoker    Smokeless tobacco: Never Used   Substance and Sexual Activity    Alcohol use: No     Alcohol/week: 4.0 standard drinks     Types: 4 Shots of liquor per week    Drug use: No    Sexual activity: Never     Birth control/protection: Post-menopausal       ROS   Review of Systems   Constitutional: Negative for chills, diaphoresis, fatigue and fever.   HENT: Negative for sore throat.    Respiratory: Negative for shortness of breath.    Cardiovascular: Negative for chest pain.   Gastrointestinal: Negative for diarrhea, nausea and vomiting.   Genitourinary: Negative for dysuria.   Musculoskeletal: Positive for arthralgias (R hip), joint swelling (R ankle) and myalgias (RLE, R buttock). Negative for back pain.   Skin: Negative for rash.   Neurological: Negative for dizziness, seizures, weakness, light-headedness, numbness and headaches.   Hematological: Does not bruise/bleed easily.   All other systems reviewed and are negative.    Physical Exam      Initial Vitals [20 0735]   BP Pulse Resp Temp SpO2   (!) 163/71 70 16 98.7 °F (37.1 °C) 95 %      MAP       --          Physical Exam  Nursing Notes and Vital Signs Reviewed.  Constitutional: Patient is in no acute distress. Well-developed and well-nourished.  Head: Atraumatic. Normocephalic.  Eyes: PERRL. EOM intact. Conjunctivae are not pale. No scleral icterus.  ENT: Mucous membranes are moist. Oropharynx is clear and symmetric.    Neck: Supple. Full ROM. No lymphadenopathy.  Cardiovascular: Regular rate.  "Regular rhythm. No murmurs, rubs, or gallops. Distal pulses are 2+ and symmetric.  Pulmonary/Chest: No respiratory distress. Clear to auscultation bilaterally. No wheezing or rales.  Abdominal: Soft and non-distended.  There is no tenderness.  No rebound, guarding, or rigidity.   Musculoskeletal: Moves all extremities. No obvious deformities. No edema. +SLR on the right.  Skin: Warm and dry.  Neurological:  Alert, awake, and appropriate.  Normal speech.  No acute focal neurological deficits are appreciated.  Psychiatric: Normal affect. Good eye contact. Appropriate in content.    ED Course    Procedures  ED Vital Signs:  Vitals:    08/24/20 0735 08/24/20 0800 08/24/20 0814 08/24/20 0815   BP: (!) 163/71  (!) 175/80    Pulse: 70   65   Resp: 16   15   Temp: 98.7 °F (37.1 °C)      TempSrc: Oral      SpO2: 95%   96%   Weight:  50.4 kg (111 lb 3.2 oz)     Height: 5' 3" (1.6 m)       08/24/20 0840   BP: 132/66   Pulse: 61   Resp: 20   Temp:    TempSrc:    SpO2: 98%   Weight:    Height:      Imaging Results:  Imaging Results          CT Pelvis Without Contrast (Final result)  Result time 08/24/20 09:36:16    Final result by Ángel Jordan Jr., MD (08/24/20 09:36:16)                 Impression:      1. No acute fractures are visible.  There is osteopenia with an unchanged mild, chronic superior endplate deformity of L5.  2. Grade 1 degenerative spondylolisthesis at L4-L5.      Electronically signed by: Ángel Jordan Jr., MD  Date:    08/24/2020  Time:    09:36             Narrative:    EXAMINATION:  CT PELVIS WITHOUT CONTRAST    CLINICAL HISTORY:  Pelvic trauma, nondiagnostic xray;    TECHNIQUE:  Axial 1.25-mm images of the pelvis obtained without intravenous contrast.  Data submitted for coronal and sagittal reformats.  All CT scans at this facility use dose modulation, iterative reconstruction and/or weight based dosing when appropriate to reduce radiation dose to as low as reasonably achievable.    COMPARISON:  CT of " the lumbar spine from March 20, 2017.    FINDINGS:  Osteopenia.  Bilateral femoral fracture repairs.  Right-sided neural stimulator device in place within the subcutaneous tissue.  Unchanged chronic mild, superior endplate fracture of L5.  Grade 1 degenerative spondylolisthesis at L4-L5.  Joints maintain appropriate alignment.  No acute fractures.  Right ovarian cyst measuring 37 mm.                               X-Ray Sacrum And Coccyx (Final result)  Result time 08/24/20 08:42:33    Final result by Ángel Jordan Jr., MD (08/24/20 08:42:33)                 Impression:      No definite acute findings within limits of osteopenia and overlying bowel.      Electronically signed by: Ángel Jordan Jr., MD  Date:    08/24/2020  Time:    08:42             Narrative:    EXAMINATION:  XR SACRUM AND COCCYX    CLINICAL HISTORY:  Unspecified fall, initial encounter    TECHNIQUE:  Two or three views of the sacrum and coccyx were performed.    COMPARISON:  None    FINDINGS:  Osteopenia.  Evaluation of the sacrum is limited by overlying bowel.  No definite fracture of the sacrum or coccyx.                               X-Ray Hip 2 or 3 views Right (Final result)  Result time 08/24/20 08:47:41    Final result by SHELLY Rodriguez Sr., MD (08/24/20 08:47:41)                 Impression:      1. There is no acute fracture visualized.  2. There is an intramedullary nail in the proximal portion of the right femur. The joint space of the right hip is normal in appearance.  3. There is an intramedullary nail in the proximal portion of the left femur.  There are sclerotic changes in the lateral aspect of the left femoral head.  This is characteristic of a bone infarct.  4. A generator and its electrodes are projected over the right side of the pelvis and abdomen.  5. There is a prominent amount of fecal material within the visualized portion of the colon.      Electronically signed by: Ray Rodriguez  MD  Date:    08/24/2020  Time:    08:47             Narrative:    EXAMINATION:  XR HIP 2 VIEW RIGHT    CLINICAL HISTORY:  Pain hip/pelvic (719.45);    COMPARISON:  11/01/2016    FINDINGS:  There is an intramedullary nail in the proximal portion of the left femur.  There are sclerotic changes in the lateral aspect of the left femoral head.  There is an intramedullary nail in the proximal portion of the right femur.  There is no acute fracture visualized.  There is no dislocation.  The joint space of the right hip is normal in appearance.  A generator and its electrodes are projected over the right side of the pelvis and abdomen.  There is a prominent amount of fecal material within the visualized portion of the colon.                                        The Emergency Provider reviewed the vital signs and test results, which are outlined above.    ED Discussion     9:40 AM: Reassessed pt at this time. Discussed with pt all pertinent ED information and results. Discussed pt dx and plan of tx. Gave pt all f/u and return to the ED instructions. All questions and concerns were addressed at this time. Pt expresses understanding of information and instructions, and is comfortable with plan to discharge. Pt is stable for discharge.    I discussed with patient and/or family/caretaker that evaluation in the ED does not suggest any emergent or life threatening medical conditions requiring immediate intervention beyond what was provided in the ED, and I believe patient is safe for discharge.  Regardless, an unremarkable evaluation in the ED does not preclude the development or presence of a serious of life threatening condition. As such, patient was instructed to return immediately for any worsening or change in current symptoms.         ED Medication(s):  Medications - No data to display    Follow-up Information     Evan Bernard MD.    Specialty: Internal Medicine  Contact information:  [address]  [place] [address]  [phone]                  New Prescriptions    PREDNISONE (DELTASONE) 20 MG TABLET    Take 1 tablet (20 mg total) by mouth once daily. for 5 days         Medical Decision Making    Medical Decision Making:   Clinical Tests:   Radiological Study: Ordered and Reviewed           Scribe Attestation:   Scribe #1: I performed the above scribed service and the documentation accurately describes the services I performed. I attest to the accuracy of the note.    Attending:   Physician Attestation Statement for Scribe #1: I, Dimas Magallon MD, personally performed the services described in this documentation, as scribed by Luis Brown, in my presence, and it is both accurate and complete.          Clinical Impression       ICD-10-CM ICD-9-CM   1. Fall  W19.XXXA E888.9   2. Pain of right hip joint  M25.551 719.45   3. Sciatica of right side  M54.31 724.3       Disposition:   Disposition: Discharged  Condition: Stable         Dimas Magallon MD  08/24/20 0945

## 2020-09-29 ENCOUNTER — PATIENT MESSAGE (OUTPATIENT)
Dept: OTHER | Facility: OTHER | Age: 85
End: 2020-09-29

## 2020-11-06 ENCOUNTER — PATIENT OUTREACH (OUTPATIENT)
Dept: ADMINISTRATIVE | Facility: OTHER | Age: 85
End: 2020-11-06

## 2020-11-06 ENCOUNTER — OFFICE VISIT (OUTPATIENT)
Dept: OPHTHALMOLOGY | Facility: CLINIC | Age: 85
End: 2020-11-06
Payer: MEDICARE

## 2020-11-06 DIAGNOSIS — H35.3132 INTERMEDIATE STAGE NONEXUDATIVE AGE-RELATED MACULAR DEGENERATION OF BOTH EYES: ICD-10-CM

## 2020-11-06 DIAGNOSIS — H18.20 CORNEAL EDEMA OF RIGHT EYE: ICD-10-CM

## 2020-11-06 DIAGNOSIS — Z96.1 PSEUDOPHAKIA: Primary | ICD-10-CM

## 2020-11-06 PROCEDURE — 99999 PR PBB SHADOW E&M-EST. PATIENT-LVL II: ICD-10-PCS | Mod: PBBFAC,HCNC,, | Performed by: OPHTHALMOLOGY

## 2020-11-06 PROCEDURE — 92014 COMPRE OPH EXAM EST PT 1/>: CPT | Mod: HCNC,S$GLB,, | Performed by: OPHTHALMOLOGY

## 2020-11-06 PROCEDURE — 99999 PR PBB SHADOW E&M-EST. PATIENT-LVL II: CPT | Mod: PBBFAC,HCNC,, | Performed by: OPHTHALMOLOGY

## 2020-11-06 PROCEDURE — 92014 PR EYE EXAM, EST PATIENT,COMPREHESV: ICD-10-PCS | Mod: HCNC,S$GLB,, | Performed by: OPHTHALMOLOGY

## 2020-11-06 NOTE — PROGRESS NOTES
Health Maintenance Due   Topic Date Due    TETANUS VACCINE  01/20/1945    Shingles Vaccine (1 of 2) 01/20/1977    Pneumococcal Vaccine (65+ Low/Medium Risk) (1 of 2 - PCV13) 01/20/1992    Lipid Panel  08/10/2016    Influenza Vaccine (1) 08/01/2020     Updates were requested from care everywhere.  Chart was reviewed for overdue Proactive [facility] Encounters (EUNICE) topics (CRS, Breast Cancer Screening, Eye exam)  Health Maintenance has been updated.  LINKS immunization registry triggered.  Immunizations were reconciled.

## 2020-11-06 NOTE — PROGRESS NOTES
SUBJECTIVE  Virginia NORA Olsen is 89 y.o. female  Corrected distance visual acuity was CF at 3' in the right eye and 20/40 in the left eye.   Chief Complaint   Patient presents with    Follow-up     9 month recheck with dilation          HPI     Follow-up      Additional comments: 9 month recheck with dilation              Comments     Patient states OU is doing well, has not used the Benja 128 in 6 months,   denies any irritation and no changes in VA.      1. PCIOL OU  2. YAG OU  3. Dry AMD OS>OD  4. Dry Eyes  5. K edema OD (not interested in K consult)  6. Refractive Error    OD: Benja 128 drop QD          Last edited by Jolene Pascual, Patient Care Assistant on 11/6/2020  9:01   AM. (History)         Assessment /Plan :  1. Pseudophakia  -- Condition stable, no therapeutic change required. Monitoring routinely.     2. Corneal edema of right eye resume the Benja 128 eyedrops OD   3. Intermediate stage nonexudative age-related macular degeneration of both eyes Exam consistent with age related macular degeneration OU with elevated risk findings. Discussed clinical condition and recommend avoidance of tobacco products.Patient instructed in the use of daily Amsler Grid, AREDS II formula. Patient advised to call immediately if any Amsler Grid / visual changes occur. Regular follow up recommended.        RTC in one year or prn

## 2020-12-11 ENCOUNTER — PATIENT MESSAGE (OUTPATIENT)
Dept: OTHER | Facility: OTHER | Age: 85
End: 2020-12-11

## 2021-01-22 ENCOUNTER — PATIENT MESSAGE (OUTPATIENT)
Dept: ADMINISTRATIVE | Facility: OTHER | Age: 86
End: 2021-01-22

## 2021-03-23 ENCOUNTER — PES CALL (OUTPATIENT)
Dept: ADMINISTRATIVE | Facility: CLINIC | Age: 86
End: 2021-03-23

## 2021-04-01 ENCOUNTER — PES CALL (OUTPATIENT)
Dept: ADMINISTRATIVE | Facility: CLINIC | Age: 86
End: 2021-04-01

## 2021-04-06 ENCOUNTER — PATIENT OUTREACH (OUTPATIENT)
Dept: ADMINISTRATIVE | Facility: HOSPITAL | Age: 86
End: 2021-04-06

## 2021-04-20 ENCOUNTER — PES CALL (OUTPATIENT)
Dept: ADMINISTRATIVE | Facility: CLINIC | Age: 86
End: 2021-04-20

## 2021-04-28 ENCOUNTER — PES CALL (OUTPATIENT)
Dept: ADMINISTRATIVE | Facility: CLINIC | Age: 86
End: 2021-04-28

## 2021-09-15 RX ORDER — TRIAMCINOLONE ACETONIDE 1 MG/G
CREAM TOPICAL 2 TIMES DAILY
Qty: 45 G | Refills: 1 | Status: SHIPPED | OUTPATIENT
Start: 2021-09-15 | End: 2022-11-19

## 2021-09-28 ENCOUNTER — PES CALL (OUTPATIENT)
Dept: ADMINISTRATIVE | Facility: CLINIC | Age: 86
End: 2021-09-28

## 2021-11-05 ENCOUNTER — PATIENT OUTREACH (OUTPATIENT)
Dept: ADMINISTRATIVE | Facility: OTHER | Age: 86
End: 2021-11-05
Payer: MEDICARE

## 2021-11-08 ENCOUNTER — OFFICE VISIT (OUTPATIENT)
Dept: OPHTHALMOLOGY | Facility: CLINIC | Age: 86
End: 2021-11-08
Payer: MEDICARE

## 2021-11-08 DIAGNOSIS — H18.20 CORNEAL EDEMA OF RIGHT EYE: ICD-10-CM

## 2021-11-08 DIAGNOSIS — Z96.1 PSEUDOPHAKIA: ICD-10-CM

## 2021-11-08 DIAGNOSIS — H52.7 REFRACTIVE ERROR: ICD-10-CM

## 2021-11-08 DIAGNOSIS — H35.3132 INTERMEDIATE STAGE NONEXUDATIVE AGE-RELATED MACULAR DEGENERATION OF BOTH EYES: Primary | ICD-10-CM

## 2021-11-08 PROCEDURE — 99999 PR PBB SHADOW E&M-EST. PATIENT-LVL I: ICD-10-PCS | Mod: PBBFAC,HCNC,, | Performed by: OPHTHALMOLOGY

## 2021-11-08 PROCEDURE — 92014 PR EYE EXAM, EST PATIENT,COMPREHESV: ICD-10-PCS | Mod: HCNC,S$GLB,, | Performed by: OPHTHALMOLOGY

## 2021-11-08 PROCEDURE — 1159F MED LIST DOCD IN RCRD: CPT | Mod: HCNC,CPTII,S$GLB, | Performed by: OPHTHALMOLOGY

## 2021-11-08 PROCEDURE — 92015 DETERMINE REFRACTIVE STATE: CPT | Mod: HCNC,S$GLB,, | Performed by: OPHTHALMOLOGY

## 2021-11-08 PROCEDURE — 1159F PR MEDICATION LIST DOCUMENTED IN MEDICAL RECORD: ICD-10-PCS | Mod: HCNC,CPTII,S$GLB, | Performed by: OPHTHALMOLOGY

## 2021-11-08 PROCEDURE — 92015 PR REFRACTION: ICD-10-PCS | Mod: HCNC,S$GLB,, | Performed by: OPHTHALMOLOGY

## 2021-11-08 PROCEDURE — 92014 COMPRE OPH EXAM EST PT 1/>: CPT | Mod: HCNC,S$GLB,, | Performed by: OPHTHALMOLOGY

## 2021-11-08 PROCEDURE — 92134 POSTERIOR SEGMENT OCT RETINA (OCULAR COHERENCE TOMOGRAPHY)-BOTH EYES: ICD-10-PCS | Mod: HCNC,S$GLB,, | Performed by: OPHTHALMOLOGY

## 2021-11-08 PROCEDURE — 92134 CPTRZ OPH DX IMG PST SGM RTA: CPT | Mod: HCNC,S$GLB,, | Performed by: OPHTHALMOLOGY

## 2021-11-08 PROCEDURE — 1160F PR REVIEW ALL MEDS BY PRESCRIBER/CLIN PHARMACIST DOCUMENTED: ICD-10-PCS | Mod: HCNC,CPTII,S$GLB, | Performed by: OPHTHALMOLOGY

## 2021-11-08 PROCEDURE — 1160F RVW MEDS BY RX/DR IN RCRD: CPT | Mod: HCNC,CPTII,S$GLB, | Performed by: OPHTHALMOLOGY

## 2021-11-08 PROCEDURE — 99999 PR PBB SHADOW E&M-EST. PATIENT-LVL I: CPT | Mod: PBBFAC,HCNC,, | Performed by: OPHTHALMOLOGY

## 2021-11-23 ENCOUNTER — HOSPITAL ENCOUNTER (EMERGENCY)
Facility: HOSPITAL | Age: 86
Discharge: HOME | End: 2021-11-23
Attending: EMERGENCY MEDICINE
Payer: MEDICARE

## 2021-11-23 ENCOUNTER — TELEPHONE (OUTPATIENT)
Dept: ORTHOPEDICS | Facility: CLINIC | Age: 86
End: 2021-11-23
Payer: MEDICARE

## 2021-11-23 VITALS
HEART RATE: 87 BPM | BODY MASS INDEX: 19.69 KG/M2 | HEIGHT: 63 IN | TEMPERATURE: 98 F | SYSTOLIC BLOOD PRESSURE: 151 MMHG | WEIGHT: 111.13 LBS | RESPIRATION RATE: 20 BRPM | DIASTOLIC BLOOD PRESSURE: 70 MMHG | OXYGEN SATURATION: 95 %

## 2021-11-23 DIAGNOSIS — S42.222A CLOSED 2-PART DISPLACED FRACTURE OF SURGICAL NECK OF LEFT HUMERUS, INITIAL ENCOUNTER: Primary | ICD-10-CM

## 2021-11-23 DIAGNOSIS — M54.50 CHRONIC LEFT-SIDED LOW BACK PAIN WITHOUT SCIATICA: ICD-10-CM

## 2021-11-23 DIAGNOSIS — M25.512 LEFT SHOULDER PAIN: ICD-10-CM

## 2021-11-23 DIAGNOSIS — G89.29 CHRONIC LEFT-SIDED LOW BACK PAIN WITHOUT SCIATICA: ICD-10-CM

## 2021-11-23 LAB
ALBUMIN SERPL BCP-MCNC: 3.6 G/DL (ref 3.5–5.2)
ALP SERPL-CCNC: 57 U/L (ref 55–135)
ALT SERPL W/O P-5'-P-CCNC: 15 U/L (ref 10–44)
ANION GAP SERPL CALC-SCNC: 15 MMOL/L (ref 8–16)
AST SERPL-CCNC: 19 U/L (ref 10–40)
BASOPHILS # BLD AUTO: 0.01 K/UL (ref 0–0.2)
BASOPHILS NFR BLD: 0.1 % (ref 0–1.9)
BILIRUB SERPL-MCNC: 0.5 MG/DL (ref 0.1–1)
BUN SERPL-MCNC: 16 MG/DL (ref 10–30)
CALCIUM SERPL-MCNC: 8.6 MG/DL (ref 8.7–10.5)
CHLORIDE SERPL-SCNC: 104 MMOL/L (ref 95–110)
CK SERPL-CCNC: 117 U/L (ref 20–180)
CO2 SERPL-SCNC: 19 MMOL/L (ref 23–29)
CREAT SERPL-MCNC: 0.7 MG/DL (ref 0.5–1.4)
DIFFERENTIAL METHOD: ABNORMAL
EOSINOPHIL # BLD AUTO: 0 K/UL (ref 0–0.5)
EOSINOPHIL NFR BLD: 0 % (ref 0–8)
ERYTHROCYTE [DISTWIDTH] IN BLOOD BY AUTOMATED COUNT: 17.1 % (ref 11.5–14.5)
EST. GFR  (AFRICAN AMERICAN): >60 ML/MIN/1.73 M^2
EST. GFR  (NON AFRICAN AMERICAN): >60 ML/MIN/1.73 M^2
GLUCOSE SERPL-MCNC: 167 MG/DL (ref 70–110)
HCT VFR BLD AUTO: 26.1 % (ref 37–48.5)
HGB BLD-MCNC: 7.9 G/DL (ref 12–16)
IMM GRANULOCYTES # BLD AUTO: 0.05 K/UL (ref 0–0.04)
IMM GRANULOCYTES NFR BLD AUTO: 0.4 % (ref 0–0.5)
LYMPHOCYTES # BLD AUTO: 0.3 K/UL (ref 1–4.8)
LYMPHOCYTES NFR BLD: 2.5 % (ref 18–48)
MCH RBC QN AUTO: 22.8 PG (ref 27–31)
MCHC RBC AUTO-ENTMCNC: 30.3 G/DL (ref 32–36)
MCV RBC AUTO: 75 FL (ref 82–98)
MONOCYTES # BLD AUTO: 0.5 K/UL (ref 0.3–1)
MONOCYTES NFR BLD: 3.9 % (ref 4–15)
NEUTROPHILS # BLD AUTO: 10.9 K/UL (ref 1.8–7.7)
NEUTROPHILS NFR BLD: 93.1 % (ref 38–73)
NRBC BLD-RTO: 0 /100 WBC
PLATELET # BLD AUTO: 282 K/UL (ref 150–450)
PMV BLD AUTO: 10 FL (ref 9.2–12.9)
POTASSIUM SERPL-SCNC: 3.5 MMOL/L (ref 3.5–5.1)
PROT SERPL-MCNC: 7 G/DL (ref 6–8.4)
RBC # BLD AUTO: 3.47 M/UL (ref 4–5.4)
SODIUM SERPL-SCNC: 138 MMOL/L (ref 136–145)
WBC # BLD AUTO: 11.66 K/UL (ref 3.9–12.7)

## 2021-11-23 PROCEDURE — 82550 ASSAY OF CK (CPK): CPT | Mod: HCNC | Performed by: EMERGENCY MEDICINE

## 2021-11-23 PROCEDURE — 96374 THER/PROPH/DIAG INJ IV PUSH: CPT | Mod: HCNC

## 2021-11-23 PROCEDURE — 25000003 PHARM REV CODE 250: Mod: HCNC | Performed by: EMERGENCY MEDICINE

## 2021-11-23 PROCEDURE — 80053 COMPREHEN METABOLIC PANEL: CPT | Mod: HCNC | Performed by: EMERGENCY MEDICINE

## 2021-11-23 PROCEDURE — 96375 TX/PRO/DX INJ NEW DRUG ADDON: CPT | Mod: HCNC

## 2021-11-23 PROCEDURE — 99285 EMERGENCY DEPT VISIT HI MDM: CPT | Mod: 25,HCNC

## 2021-11-23 PROCEDURE — 85025 COMPLETE CBC W/AUTO DIFF WBC: CPT | Mod: HCNC | Performed by: EMERGENCY MEDICINE

## 2021-11-23 PROCEDURE — 63600175 PHARM REV CODE 636 W HCPCS: Mod: HCNC | Performed by: EMERGENCY MEDICINE

## 2021-11-23 PROCEDURE — 96361 HYDRATE IV INFUSION ADD-ON: CPT | Mod: HCNC

## 2021-11-23 RX ORDER — ONDANSETRON 4 MG/1
4 TABLET, FILM COATED ORAL EVERY 6 HOURS PRN
Qty: 12 TABLET | Refills: 0 | Status: SHIPPED | OUTPATIENT
Start: 2021-11-23 | End: 2022-11-19

## 2021-11-23 RX ORDER — OXYCODONE AND ACETAMINOPHEN 7.5; 325 MG/1; MG/1
1 TABLET ORAL EVERY 6 HOURS PRN
Qty: 14 TABLET | Refills: 0 | Status: SHIPPED | OUTPATIENT
Start: 2021-11-23 | End: 2021-11-30 | Stop reason: SDUPTHER

## 2021-11-23 RX ORDER — MORPHINE SULFATE 4 MG/ML
2 INJECTION, SOLUTION INTRAMUSCULAR; INTRAVENOUS
Status: COMPLETED | OUTPATIENT
Start: 2021-11-23 | End: 2021-11-23

## 2021-11-23 RX ORDER — ONDANSETRON 2 MG/ML
4 INJECTION INTRAMUSCULAR; INTRAVENOUS
Status: COMPLETED | OUTPATIENT
Start: 2021-11-23 | End: 2021-11-23

## 2021-11-23 RX ADMIN — SODIUM CHLORIDE 500 ML: 0.9 INJECTION, SOLUTION INTRAVENOUS at 09:11

## 2021-11-23 RX ADMIN — MORPHINE SULFATE 2 MG: 4 INJECTION INTRAVENOUS at 09:11

## 2021-11-23 RX ADMIN — ONDANSETRON HYDROCHLORIDE 4 MG: 2 SOLUTION INTRAMUSCULAR; INTRAVENOUS at 09:11

## 2021-11-24 ENCOUNTER — HOSPITAL ENCOUNTER (OUTPATIENT)
Facility: HOSPITAL | Age: 86
Discharge: NURSING, REHAB OR LONG-TERM CARE | End: 2021-11-30
Attending: EMERGENCY MEDICINE | Admitting: FAMILY MEDICINE
Payer: MEDICARE

## 2021-11-24 ENCOUNTER — OFFICE VISIT (OUTPATIENT)
Dept: HOME HEALTH SERVICES | Facility: CLINIC | Age: 86
End: 2021-11-24
Payer: MEDICARE

## 2021-11-24 ENCOUNTER — TELEPHONE (OUTPATIENT)
Dept: ORTHOPEDICS | Facility: CLINIC | Age: 86
End: 2021-11-24
Payer: MEDICARE

## 2021-11-24 ENCOUNTER — TELEPHONE (OUTPATIENT)
Dept: FAMILY MEDICINE | Facility: CLINIC | Age: 86
End: 2021-11-24
Payer: MEDICARE

## 2021-11-24 VITALS
WEIGHT: 105 LBS | TEMPERATURE: 99 F | OXYGEN SATURATION: 94 % | SYSTOLIC BLOOD PRESSURE: 135 MMHG | BODY MASS INDEX: 18.61 KG/M2 | HEART RATE: 97 BPM | DIASTOLIC BLOOD PRESSURE: 71 MMHG | HEIGHT: 63 IN

## 2021-11-24 DIAGNOSIS — M54.50 CHRONIC RIGHT-SIDED LOW BACK PAIN, UNSPECIFIED WHETHER SCIATICA PRESENT: ICD-10-CM

## 2021-11-24 DIAGNOSIS — M54.50 CHRONIC LEFT-SIDED LOW BACK PAIN WITHOUT SCIATICA: ICD-10-CM

## 2021-11-24 DIAGNOSIS — M81.0 OSTEOPOROSIS, UNSPECIFIED OSTEOPOROSIS TYPE, UNSPECIFIED PATHOLOGICAL FRACTURE PRESENCE: ICD-10-CM

## 2021-11-24 DIAGNOSIS — R07.9 CHEST PAIN: ICD-10-CM

## 2021-11-24 DIAGNOSIS — H35.30 MACULAR DEGENERATION, UNSPECIFIED LATERALITY, UNSPECIFIED TYPE: ICD-10-CM

## 2021-11-24 DIAGNOSIS — M25.552 LEFT HIP PAIN: ICD-10-CM

## 2021-11-24 DIAGNOSIS — S32.592A CLOSED FRACTURE OF MULTIPLE PUBIC RAMI, LEFT, INITIAL ENCOUNTER: Primary | ICD-10-CM

## 2021-11-24 DIAGNOSIS — S42.202D CLOSED FRACTURE OF PROXIMAL END OF LEFT HUMERUS WITH ROUTINE HEALING, UNSPECIFIED FRACTURE MORPHOLOGY, SUBSEQUENT ENCOUNTER: ICD-10-CM

## 2021-11-24 DIAGNOSIS — Z85.828 HISTORY OF SCC (SQUAMOUS CELL CARCINOMA) OF SKIN: ICD-10-CM

## 2021-11-24 DIAGNOSIS — Z00.00 ENCOUNTER FOR PREVENTIVE HEALTH EXAMINATION: Primary | ICD-10-CM
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
## 2021-11-24 DIAGNOSIS — E78.2 MIXED HYPERLIPIDEMIA: ICD-10-CM

## 2021-11-24 DIAGNOSIS — M51.34 DDD (DEGENERATIVE DISC DISEASE), THORACIC: ICD-10-CM

## 2021-11-24 DIAGNOSIS — M54.81 OCCIPITAL NEURALGIA OF RIGHT SIDE: Chronic | ICD-10-CM

## 2021-11-24 DIAGNOSIS — R63.4 ABNORMAL WEIGHT LOSS: ICD-10-CM

## 2021-11-24 DIAGNOSIS — I10 PRIMARY HYPERTENSION: ICD-10-CM

## 2021-11-24 DIAGNOSIS — D64.9 ANEMIA, UNSPECIFIED TYPE: ICD-10-CM

## 2021-11-24 DIAGNOSIS — S42.202A CLOSED FRACTURE OF PROXIMAL END OF LEFT HUMERUS, UNSPECIFIED FRACTURE MORPHOLOGY, INITIAL ENCOUNTER: ICD-10-CM

## 2021-11-24 DIAGNOSIS — S42.222D CLOSED 2-PART DISPLACED FRACTURE OF SURGICAL NECK OF LEFT HUMERUS WITH ROUTINE HEALING, SUBSEQUENT ENCOUNTER: ICD-10-CM

## 2021-11-24 DIAGNOSIS — H35.3132 INTERMEDIATE STAGE NONEXUDATIVE AGE-RELATED MACULAR DEGENERATION OF BOTH EYES: ICD-10-CM

## 2021-11-24 DIAGNOSIS — I77.1 TORTUOUS AORTA: ICD-10-CM
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
## 2021-11-24 DIAGNOSIS — I49.9 IRREGULAR HEART BEAT: ICD-10-CM

## 2021-11-24 DIAGNOSIS — G89.29 CHRONIC RIGHT-SIDED LOW BACK PAIN, UNSPECIFIED WHETHER SCIATICA PRESENT: ICD-10-CM

## 2021-11-24 DIAGNOSIS — H91.93 BILATERAL HEARING LOSS, UNSPECIFIED HEARING LOSS TYPE: ICD-10-CM

## 2021-11-24 DIAGNOSIS — G89.29 CHRONIC LEFT-SIDED LOW BACK PAIN WITHOUT SCIATICA: ICD-10-CM

## 2021-11-24 PROBLEM — R53.81 DEBILITY: Status: ACTIVE | Noted: 2021-11-24

## 2021-11-24 PROBLEM — S42.222A CLOSED 2-PART DISPLACED FRACTURE OF SURGICAL NECK OF LEFT HUMERUS: Status: ACTIVE | Noted: 2021-11-24

## 2021-11-24 PROBLEM — D62 ACUTE BLOOD LOSS ANEMIA: Status: ACTIVE | Noted: 2021-11-24

## 2021-11-24 LAB
ABO + RH BLD: NORMAL
ANION GAP SERPL CALC-SCNC: 9 MMOL/L (ref 8–16)
BASOPHILS # BLD AUTO: 0.03 K/UL (ref 0–0.2)
BASOPHILS NFR BLD: 0.3 % (ref 0–1.9)
BLD GP AB SCN CELLS X3 SERPL QL: NORMAL
BUN SERPL-MCNC: 19 MG/DL (ref 10–30)
CALCIUM SERPL-MCNC: 8.5 MG/DL (ref 8.7–10.5)
CHLORIDE SERPL-SCNC: 106 MMOL/L (ref 95–110)
CO2 SERPL-SCNC: 24 MMOL/L (ref 23–29)
CREAT SERPL-MCNC: 0.6 MG/DL (ref 0.5–1.4)
CTP QC/QA: YES
DIFFERENTIAL METHOD: ABNORMAL
EOSINOPHIL # BLD AUTO: 0 K/UL (ref 0–0.5)
EOSINOPHIL NFR BLD: 0.2 % (ref 0–8)
ERYTHROCYTE [DISTWIDTH] IN BLOOD BY AUTOMATED COUNT: 17.5 % (ref 11.5–14.5)
EST. GFR  (AFRICAN AMERICAN): >60 ML/MIN/1.73 M^2
EST. GFR  (NON AFRICAN AMERICAN): >60 ML/MIN/1.73 M^2
GLUCOSE SERPL-MCNC: 95 MG/DL (ref 70–110)
HCT VFR BLD AUTO: 22.9 % (ref 37–48.5)
HGB BLD-MCNC: 6.9 G/DL (ref 12–16)
IMM GRANULOCYTES # BLD AUTO: 0.05 K/UL (ref 0–0.04)
IMM GRANULOCYTES NFR BLD AUTO: 0.5 % (ref 0–0.5)
LYMPHOCYTES # BLD AUTO: 1.1 K/UL (ref 1–4.8)
LYMPHOCYTES NFR BLD: 11.8 % (ref 18–48)
MCH RBC QN AUTO: 23.2 PG (ref 27–31)
MCHC RBC AUTO-ENTMCNC: 30.1 G/DL (ref 32–36)
MCV RBC AUTO: 77 FL (ref 82–98)
MONOCYTES # BLD AUTO: 0.9 K/UL (ref 0.3–1)
MONOCYTES NFR BLD: 9.4 % (ref 4–15)
NEUTROPHILS # BLD AUTO: 7.3 K/UL (ref 1.8–7.7)
NEUTROPHILS NFR BLD: 77.8 % (ref 38–73)
NRBC BLD-RTO: 0 /100 WBC
PLATELET # BLD AUTO: 253 K/UL (ref 150–450)
PMV BLD AUTO: 10.1 FL (ref 9.2–12.9)
POTASSIUM SERPL-SCNC: 3.5 MMOL/L (ref 3.5–5.1)
RBC # BLD AUTO: 2.98 M/UL (ref 4–5.4)
SARS-COV-2 RDRP RESP QL NAA+PROBE: NEGATIVE
SODIUM SERPL-SCNC: 139 MMOL/L (ref 136–145)
WBC # BLD AUTO: 9.38 K/UL (ref 3.9–12.7)

## 2021-11-24 PROCEDURE — U0002 COVID-19 LAB TEST NON-CDC: HCPCS | Mod: HCNC | Performed by: EMERGENCY MEDICINE

## 2021-11-24 PROCEDURE — 85025 COMPLETE CBC W/AUTO DIFF WBC: CPT | Mod: HCNC | Performed by: EMERGENCY MEDICINE

## 2021-11-24 PROCEDURE — 29240 STRAPPING OF SHOULDER: CPT | Mod: LT

## 2021-11-24 PROCEDURE — G0439 PR MEDICARE ANNUAL WELLNESS SUBSEQUENT VISIT: ICD-10-PCS | Mod: S$GLB,,, | Performed by: NURSE PRACTITIONER

## 2021-11-24 PROCEDURE — 93010 ELECTROCARDIOGRAM REPORT: CPT | Mod: HCNC,,, | Performed by: STUDENT IN AN ORGANIZED HEALTH CARE EDUCATION/TRAINING PROGRAM

## 2021-11-24 PROCEDURE — 80048 BASIC METABOLIC PNL TOTAL CA: CPT | Mod: HCNC | Performed by: EMERGENCY MEDICINE

## 2021-11-24 PROCEDURE — P9016 RBC LEUKOCYTES REDUCED: HCPCS | Mod: HCNC | Performed by: EMERGENCY MEDICINE

## 2021-11-24 PROCEDURE — 93010 EKG 12-LEAD: ICD-10-PCS | Mod: HCNC,76,, | Performed by: STUDENT IN AN ORGANIZED HEALTH CARE EDUCATION/TRAINING PROGRAM

## 2021-11-24 PROCEDURE — 93005 ELECTROCARDIOGRAM TRACING: CPT | Mod: HCNC

## 2021-11-24 PROCEDURE — G0439 PPPS, SUBSEQ VISIT: HCPCS | Mod: S$GLB,,, | Performed by: NURSE PRACTITIONER

## 2021-11-24 PROCEDURE — 25000003 PHARM REV CODE 250: Mod: HCNC | Performed by: STUDENT IN AN ORGANIZED HEALTH CARE EDUCATION/TRAINING PROGRAM

## 2021-11-24 PROCEDURE — G0378 HOSPITAL OBSERVATION PER HR: HCPCS | Mod: HCNC

## 2021-11-24 PROCEDURE — 36415 COLL VENOUS BLD VENIPUNCTURE: CPT | Mod: HCNC | Performed by: EMERGENCY MEDICINE

## 2021-11-24 PROCEDURE — 36000 PLACE NEEDLE IN VEIN: CPT | Mod: HCNC,59

## 2021-11-24 PROCEDURE — 86900 BLOOD TYPING SEROLOGIC ABO: CPT | Mod: HCNC | Performed by: EMERGENCY MEDICINE

## 2021-11-24 PROCEDURE — 86920 COMPATIBILITY TEST SPIN: CPT | Mod: HCNC | Performed by: EMERGENCY MEDICINE

## 2021-11-24 PROCEDURE — 99291 CRITICAL CARE FIRST HOUR: CPT | Mod: 25,HCNC

## 2021-11-24 RX ORDER — MORPHINE SULFATE 4 MG/ML
4 INJECTION, SOLUTION INTRAMUSCULAR; INTRAVENOUS
Status: ACTIVE | OUTPATIENT
Start: 2021-11-24 | End: 2021-11-25

## 2021-11-24 RX ORDER — ACETAMINOPHEN 325 MG/1
650 TABLET ORAL EVERY 4 HOURS PRN
Status: DISCONTINUED | OUTPATIENT
Start: 2021-11-24 | End: 2021-11-30 | Stop reason: HOSPADM

## 2021-11-24 RX ORDER — HYDROCODONE BITARTRATE AND ACETAMINOPHEN 500; 5 MG/1; MG/1
TABLET ORAL
Status: DISCONTINUED | OUTPATIENT
Start: 2021-11-24 | End: 2021-11-30 | Stop reason: HOSPADM

## 2021-11-24 RX ORDER — NALOXONE HCL 0.4 MG/ML
0.02 VIAL (ML) INJECTION
Status: DISCONTINUED | OUTPATIENT
Start: 2021-11-24 | End: 2021-11-30 | Stop reason: HOSPADM

## 2021-11-24 RX ORDER — METOPROLOL SUCCINATE 50 MG/1
50 TABLET, EXTENDED RELEASE ORAL DAILY
Status: DISCONTINUED | OUTPATIENT
Start: 2021-11-25 | End: 2021-11-30 | Stop reason: HOSPADM

## 2021-11-24 RX ORDER — HYDROCODONE BITARTRATE AND ACETAMINOPHEN 5; 325 MG/1; MG/1
1 TABLET ORAL EVERY 6 HOURS PRN
Status: DISCONTINUED | OUTPATIENT
Start: 2021-11-24 | End: 2021-11-30 | Stop reason: HOSPADM

## 2021-11-24 RX ORDER — ONDANSETRON 2 MG/ML
4 INJECTION INTRAMUSCULAR; INTRAVENOUS EVERY 8 HOURS PRN
Status: DISCONTINUED | OUTPATIENT
Start: 2021-11-24 | End: 2021-11-30 | Stop reason: HOSPADM

## 2021-11-24 RX ORDER — ONDANSETRON 2 MG/ML
4 INJECTION INTRAMUSCULAR; INTRAVENOUS
Status: ACTIVE | OUTPATIENT
Start: 2021-11-24 | End: 2021-11-25

## 2021-11-24 RX ORDER — SODIUM CHLORIDE 0.9 % (FLUSH) 0.9 %
10 SYRINGE (ML) INJECTION EVERY 12 HOURS PRN
Status: DISCONTINUED | OUTPATIENT
Start: 2021-11-24 | End: 2021-11-30 | Stop reason: HOSPADM

## 2021-11-24 RX ADMIN — HYDROCODONE BITARTRATE AND ACETAMINOPHEN 1 TABLET: 5; 325 TABLET ORAL at 11:11

## 2021-11-25 LAB
ANION GAP SERPL CALC-SCNC: 13 MMOL/L (ref 8–16)
BASOPHILS # BLD AUTO: 0.05 K/UL (ref 0–0.2)
BASOPHILS NFR BLD: 0.7 % (ref 0–1.9)
BLD PROD TYP BPU: NORMAL
BLD PROD TYP BPU: NORMAL
BLOOD UNIT EXPIRATION DATE: NORMAL
BLOOD UNIT EXPIRATION DATE: NORMAL
BLOOD UNIT TYPE CODE: 5100
BLOOD UNIT TYPE CODE: 5100
BLOOD UNIT TYPE: NORMAL
BLOOD UNIT TYPE: NORMAL
BUN SERPL-MCNC: 20 MG/DL (ref 10–30)
CALCIUM SERPL-MCNC: 8.6 MG/DL (ref 8.7–10.5)
CHLORIDE SERPL-SCNC: 107 MMOL/L (ref 95–110)
CO2 SERPL-SCNC: 20 MMOL/L (ref 23–29)
CODING SYSTEM: NORMAL
CODING SYSTEM: NORMAL
CREAT SERPL-MCNC: 0.6 MG/DL (ref 0.5–1.4)
DIFFERENTIAL METHOD: ABNORMAL
DISPENSE STATUS: NORMAL
DISPENSE STATUS: NORMAL
EOSINOPHIL # BLD AUTO: 0 K/UL (ref 0–0.5)
EOSINOPHIL NFR BLD: 0.1 % (ref 0–8)
ERYTHROCYTE [DISTWIDTH] IN BLOOD BY AUTOMATED COUNT: 17 % (ref 11.5–14.5)
EST. GFR  (AFRICAN AMERICAN): >60 ML/MIN/1.73 M^2
EST. GFR  (NON AFRICAN AMERICAN): >60 ML/MIN/1.73 M^2
GLUCOSE SERPL-MCNC: 93 MG/DL (ref 70–110)
HCT VFR BLD AUTO: 34.6 % (ref 37–48.5)
HGB BLD-MCNC: 10.3 G/DL (ref 12–16)
IMM GRANULOCYTES # BLD AUTO: 0.05 K/UL (ref 0–0.04)
IMM GRANULOCYTES NFR BLD AUTO: 0.7 % (ref 0–0.5)
LYMPHOCYTES # BLD AUTO: 0.7 K/UL (ref 1–4.8)
LYMPHOCYTES NFR BLD: 9.7 % (ref 18–48)
MCH RBC QN AUTO: 24.3 PG (ref 27–31)
MCHC RBC AUTO-ENTMCNC: 29.8 G/DL (ref 32–36)
MCV RBC AUTO: 82 FL (ref 82–98)
MONOCYTES # BLD AUTO: 0.5 K/UL (ref 0.3–1)
MONOCYTES NFR BLD: 6.9 % (ref 4–15)
NEUTROPHILS # BLD AUTO: 6.3 K/UL (ref 1.8–7.7)
NEUTROPHILS NFR BLD: 81.9 % (ref 38–73)
NRBC BLD-RTO: 0 /100 WBC
NUM UNITS TRANS PACKED RBC: NORMAL
NUM UNITS TRANS PACKED RBC: NORMAL
PLATELET # BLD AUTO: 232 K/UL (ref 150–450)
PMV BLD AUTO: 9.7 FL (ref 9.2–12.9)
POCT GLUCOSE: 111 MG/DL (ref 70–110)
POCT GLUCOSE: 125 MG/DL (ref 70–110)
POTASSIUM SERPL-SCNC: 3.5 MMOL/L (ref 3.5–5.1)
RBC # BLD AUTO: 4.24 M/UL (ref 4–5.4)
SODIUM SERPL-SCNC: 140 MMOL/L (ref 136–145)
WBC # BLD AUTO: 7.64 K/UL (ref 3.9–12.7)

## 2021-11-25 PROCEDURE — 80048 BASIC METABOLIC PNL TOTAL CA: CPT | Mod: HCNC | Performed by: FAMILY MEDICINE

## 2021-11-25 PROCEDURE — G0378 HOSPITAL OBSERVATION PER HR: HCPCS | Mod: HCNC

## 2021-11-25 PROCEDURE — 36415 COLL VENOUS BLD VENIPUNCTURE: CPT | Mod: HCNC | Performed by: FAMILY MEDICINE

## 2021-11-25 PROCEDURE — P9016 RBC LEUKOCYTES REDUCED: HCPCS | Mod: HCNC | Performed by: EMERGENCY MEDICINE

## 2021-11-25 PROCEDURE — 25000003 PHARM REV CODE 250: Mod: HCNC | Performed by: STUDENT IN AN ORGANIZED HEALTH CARE EDUCATION/TRAINING PROGRAM

## 2021-11-25 PROCEDURE — 85025 COMPLETE CBC W/AUTO DIFF WBC: CPT | Mod: HCNC | Performed by: FAMILY MEDICINE

## 2021-11-25 RX ADMIN — METOPROLOL SUCCINATE 50 MG: 50 TABLET, EXTENDED RELEASE ORAL at 09:11

## 2021-11-26 LAB
ANION GAP SERPL CALC-SCNC: 7 MMOL/L (ref 8–16)
BASOPHILS # BLD AUTO: 0.03 K/UL (ref 0–0.2)
BASOPHILS NFR BLD: 0.4 % (ref 0–1.9)
BUN SERPL-MCNC: 18 MG/DL (ref 10–30)
CALCIUM SERPL-MCNC: 8.3 MG/DL (ref 8.7–10.5)
CHLORIDE SERPL-SCNC: 106 MMOL/L (ref 95–110)
CO2 SERPL-SCNC: 24 MMOL/L (ref 23–29)
CREAT SERPL-MCNC: 0.6 MG/DL (ref 0.5–1.4)
DIFFERENTIAL METHOD: ABNORMAL
EOSINOPHIL # BLD AUTO: 0 K/UL (ref 0–0.5)
EOSINOPHIL NFR BLD: 0.2 % (ref 0–8)
ERYTHROCYTE [DISTWIDTH] IN BLOOD BY AUTOMATED COUNT: 16.9 % (ref 11.5–14.5)
EST. GFR  (AFRICAN AMERICAN): >60 ML/MIN/1.73 M^2
EST. GFR  (NON AFRICAN AMERICAN): >60 ML/MIN/1.73 M^2
GLUCOSE SERPL-MCNC: 105 MG/DL (ref 70–110)
HCT VFR BLD AUTO: 31.8 % (ref 37–48.5)
HGB BLD-MCNC: 9.9 G/DL (ref 12–16)
IMM GRANULOCYTES # BLD AUTO: 0.04 K/UL (ref 0–0.04)
IMM GRANULOCYTES NFR BLD AUTO: 0.5 % (ref 0–0.5)
LYMPHOCYTES # BLD AUTO: 0.9 K/UL (ref 1–4.8)
LYMPHOCYTES NFR BLD: 10.2 % (ref 18–48)
MCH RBC QN AUTO: 24.7 PG (ref 27–31)
MCHC RBC AUTO-ENTMCNC: 31.1 G/DL (ref 32–36)
MCV RBC AUTO: 79 FL (ref 82–98)
MONOCYTES # BLD AUTO: 0.7 K/UL (ref 0.3–1)
MONOCYTES NFR BLD: 8.4 % (ref 4–15)
NEUTROPHILS # BLD AUTO: 6.9 K/UL (ref 1.8–7.7)
NEUTROPHILS NFR BLD: 80.3 % (ref 38–73)
NRBC BLD-RTO: 0 /100 WBC
PLATELET # BLD AUTO: 227 K/UL (ref 150–450)
PMV BLD AUTO: 10.5 FL (ref 9.2–12.9)
POTASSIUM SERPL-SCNC: 3.4 MMOL/L (ref 3.5–5.1)
RBC # BLD AUTO: 4.01 M/UL (ref 4–5.4)
SODIUM SERPL-SCNC: 137 MMOL/L (ref 136–145)
WBC # BLD AUTO: 8.54 K/UL (ref 3.9–12.7)

## 2021-11-26 PROCEDURE — 80048 BASIC METABOLIC PNL TOTAL CA: CPT | Mod: HCNC | Performed by: FAMILY MEDICINE

## 2021-11-26 PROCEDURE — 97530 THERAPEUTIC ACTIVITIES: CPT | Mod: HCNC

## 2021-11-26 PROCEDURE — 97166 OT EVAL MOD COMPLEX 45 MIN: CPT | Mod: HCNC

## 2021-11-26 PROCEDURE — 25000003 PHARM REV CODE 250: Mod: HCNC | Performed by: NURSE PRACTITIONER

## 2021-11-26 PROCEDURE — 36415 COLL VENOUS BLD VENIPUNCTURE: CPT | Mod: HCNC | Performed by: FAMILY MEDICINE

## 2021-11-26 PROCEDURE — 97162 PT EVAL MOD COMPLEX 30 MIN: CPT | Mod: HCNC

## 2021-11-26 PROCEDURE — 97110 THERAPEUTIC EXERCISES: CPT | Mod: HCNC

## 2021-11-26 PROCEDURE — 85025 COMPLETE CBC W/AUTO DIFF WBC: CPT | Mod: HCNC | Performed by: FAMILY MEDICINE

## 2021-11-26 PROCEDURE — 99900038 HC OT GENERIC THERAPY SCREENING (STAT): Mod: HCNC

## 2021-11-26 PROCEDURE — G0378 HOSPITAL OBSERVATION PER HR: HCPCS | Mod: HCNC

## 2021-11-26 PROCEDURE — 25000003 PHARM REV CODE 250: Mod: HCNC | Performed by: STUDENT IN AN ORGANIZED HEALTH CARE EDUCATION/TRAINING PROGRAM

## 2021-11-26 PROCEDURE — 99900037 HC PT THERAPY SCREENING (STAT): Mod: HCNC

## 2021-11-26 RX ORDER — POLYETHYLENE GLYCOL 3350 17 G/17G
17 POWDER, FOR SOLUTION ORAL DAILY
Status: DISCONTINUED | OUTPATIENT
Start: 2021-11-26 | End: 2021-11-30 | Stop reason: HOSPADM

## 2021-11-26 RX ADMIN — METOPROLOL SUCCINATE 50 MG: 50 TABLET, EXTENDED RELEASE ORAL at 09:11

## 2021-11-26 RX ADMIN — POLYETHYLENE GLYCOL 3350 17 G: 17 POWDER, FOR SOLUTION ORAL at 03:11

## 2021-11-27 LAB
BASOPHILS # BLD AUTO: 0.03 K/UL (ref 0–0.2)
BASOPHILS NFR BLD: 0.4 % (ref 0–1.9)
DIFFERENTIAL METHOD: ABNORMAL
EOSINOPHIL # BLD AUTO: 0 K/UL (ref 0–0.5)
EOSINOPHIL NFR BLD: 0.4 % (ref 0–8)
ERYTHROCYTE [DISTWIDTH] IN BLOOD BY AUTOMATED COUNT: 17.4 % (ref 11.5–14.5)
HCT VFR BLD AUTO: 31.6 % (ref 37–48.5)
HGB BLD-MCNC: 9.9 G/DL (ref 12–16)
IMM GRANULOCYTES # BLD AUTO: 0.03 K/UL (ref 0–0.04)
IMM GRANULOCYTES NFR BLD AUTO: 0.4 % (ref 0–0.5)
LYMPHOCYTES # BLD AUTO: 1 K/UL (ref 1–4.8)
LYMPHOCYTES NFR BLD: 12.1 % (ref 18–48)
MCH RBC QN AUTO: 24.7 PG (ref 27–31)
MCHC RBC AUTO-ENTMCNC: 31.3 G/DL (ref 32–36)
MCV RBC AUTO: 79 FL (ref 82–98)
MONOCYTES # BLD AUTO: 0.9 K/UL (ref 0.3–1)
MONOCYTES NFR BLD: 10 % (ref 4–15)
NEUTROPHILS # BLD AUTO: 6.5 K/UL (ref 1.8–7.7)
NEUTROPHILS NFR BLD: 76.7 % (ref 38–73)
NRBC BLD-RTO: 0 /100 WBC
PLATELET # BLD AUTO: 224 K/UL (ref 150–450)
PMV BLD AUTO: 9.8 FL (ref 9.2–12.9)
RBC # BLD AUTO: 4.01 M/UL (ref 4–5.4)
WBC # BLD AUTO: 8.51 K/UL (ref 3.9–12.7)

## 2021-11-27 PROCEDURE — G0378 HOSPITAL OBSERVATION PER HR: HCPCS | Mod: HCNC

## 2021-11-27 PROCEDURE — 36415 COLL VENOUS BLD VENIPUNCTURE: CPT | Mod: HCNC | Performed by: FAMILY MEDICINE

## 2021-11-27 PROCEDURE — 25000003 PHARM REV CODE 250: Mod: HCNC | Performed by: NURSE PRACTITIONER

## 2021-11-27 PROCEDURE — 25000003 PHARM REV CODE 250: Mod: HCNC | Performed by: STUDENT IN AN ORGANIZED HEALTH CARE EDUCATION/TRAINING PROGRAM

## 2021-11-27 PROCEDURE — 97530 THERAPEUTIC ACTIVITIES: CPT | Mod: HCNC

## 2021-11-27 PROCEDURE — 85025 COMPLETE CBC W/AUTO DIFF WBC: CPT | Mod: HCNC | Performed by: FAMILY MEDICINE

## 2021-11-27 PROCEDURE — 97110 THERAPEUTIC EXERCISES: CPT | Mod: HCNC

## 2021-11-27 RX ORDER — POTASSIUM CHLORIDE 20 MEQ/1
40 TABLET, EXTENDED RELEASE ORAL 2 TIMES DAILY
Status: COMPLETED | OUTPATIENT
Start: 2021-11-27 | End: 2021-11-27

## 2021-11-27 RX ORDER — AMLODIPINE BESYLATE 5 MG/1
5 TABLET ORAL DAILY
Status: DISCONTINUED | OUTPATIENT
Start: 2021-11-27 | End: 2021-11-30 | Stop reason: HOSPADM

## 2021-11-27 RX ADMIN — POTASSIUM CHLORIDE 40 MEQ: 1500 TABLET, EXTENDED RELEASE ORAL at 12:11

## 2021-11-27 RX ADMIN — METOPROLOL SUCCINATE 50 MG: 50 TABLET, EXTENDED RELEASE ORAL at 09:11

## 2021-11-27 RX ADMIN — HYDROCODONE BITARTRATE AND ACETAMINOPHEN 1 TABLET: 5; 325 TABLET ORAL at 12:11

## 2021-11-27 RX ADMIN — AMLODIPINE BESYLATE 5 MG: 5 TABLET ORAL at 09:11

## 2021-11-27 RX ADMIN — POTASSIUM CHLORIDE 40 MEQ: 1500 TABLET, EXTENDED RELEASE ORAL at 08:11

## 2021-11-27 RX ADMIN — POLYETHYLENE GLYCOL 3350 17 G: 17 POWDER, FOR SOLUTION ORAL at 09:11

## 2021-11-28 LAB
25(OH)D3+25(OH)D2 SERPL-MCNC: 17 NG/ML (ref 30–96)
ANION GAP SERPL CALC-SCNC: 7 MMOL/L (ref 8–16)
BASOPHILS # BLD AUTO: 0.02 K/UL (ref 0–0.2)
BASOPHILS NFR BLD: 0.3 % (ref 0–1.9)
BUN SERPL-MCNC: 14 MG/DL (ref 10–30)
CALCIUM SERPL-MCNC: 8.5 MG/DL (ref 8.7–10.5)
CHLORIDE SERPL-SCNC: 107 MMOL/L (ref 95–110)
CO2 SERPL-SCNC: 23 MMOL/L (ref 23–29)
CREAT SERPL-MCNC: 0.6 MG/DL (ref 0.5–1.4)
DIFFERENTIAL METHOD: ABNORMAL
EOSINOPHIL # BLD AUTO: 0.1 K/UL (ref 0–0.5)
EOSINOPHIL NFR BLD: 1.1 % (ref 0–8)
ERYTHROCYTE [DISTWIDTH] IN BLOOD BY AUTOMATED COUNT: 18.5 % (ref 11.5–14.5)
EST. GFR  (AFRICAN AMERICAN): >60 ML/MIN/1.73 M^2
EST. GFR  (NON AFRICAN AMERICAN): >60 ML/MIN/1.73 M^2
GLUCOSE SERPL-MCNC: 107 MG/DL (ref 70–110)
HCT VFR BLD AUTO: 32.6 % (ref 37–48.5)
HGB BLD-MCNC: 10.1 G/DL (ref 12–16)
IMM GRANULOCYTES # BLD AUTO: 0.03 K/UL (ref 0–0.04)
IMM GRANULOCYTES NFR BLD AUTO: 0.4 % (ref 0–0.5)
LYMPHOCYTES # BLD AUTO: 0.9 K/UL (ref 1–4.8)
LYMPHOCYTES NFR BLD: 12 % (ref 18–48)
MCH RBC QN AUTO: 24.8 PG (ref 27–31)
MCHC RBC AUTO-ENTMCNC: 31 G/DL (ref 32–36)
MCV RBC AUTO: 80 FL (ref 82–98)
MONOCYTES # BLD AUTO: 0.7 K/UL (ref 0.3–1)
MONOCYTES NFR BLD: 9.3 % (ref 4–15)
NEUTROPHILS # BLD AUTO: 5.7 K/UL (ref 1.8–7.7)
NEUTROPHILS NFR BLD: 76.9 % (ref 38–73)
NRBC BLD-RTO: 0 /100 WBC
PLATELET # BLD AUTO: 250 K/UL (ref 150–450)
PMV BLD AUTO: 10.3 FL (ref 9.2–12.9)
POTASSIUM SERPL-SCNC: 4.2 MMOL/L (ref 3.5–5.1)
RBC # BLD AUTO: 4.08 M/UL (ref 4–5.4)
SODIUM SERPL-SCNC: 137 MMOL/L (ref 136–145)
WBC # BLD AUTO: 7.39 K/UL (ref 3.9–12.7)

## 2021-11-28 PROCEDURE — 25000003 PHARM REV CODE 250: Mod: HCNC | Performed by: STUDENT IN AN ORGANIZED HEALTH CARE EDUCATION/TRAINING PROGRAM

## 2021-11-28 PROCEDURE — 36415 COLL VENOUS BLD VENIPUNCTURE: CPT | Mod: HCNC | Performed by: NURSE PRACTITIONER

## 2021-11-28 PROCEDURE — 97530 THERAPEUTIC ACTIVITIES: CPT | Mod: HCNC,CQ

## 2021-11-28 PROCEDURE — 85025 COMPLETE CBC W/AUTO DIFF WBC: CPT | Mod: HCNC | Performed by: NURSE PRACTITIONER

## 2021-11-28 PROCEDURE — 80048 BASIC METABOLIC PNL TOTAL CA: CPT | Mod: HCNC | Performed by: NURSE PRACTITIONER

## 2021-11-28 PROCEDURE — G0378 HOSPITAL OBSERVATION PER HR: HCPCS | Mod: HCNC

## 2021-11-28 PROCEDURE — 25000003 PHARM REV CODE 250: Mod: HCNC | Performed by: NURSE PRACTITIONER

## 2021-11-28 PROCEDURE — 82306 VITAMIN D 25 HYDROXY: CPT | Mod: HCNC | Performed by: NURSE PRACTITIONER

## 2021-11-28 RX ADMIN — HYDROCODONE BITARTRATE AND ACETAMINOPHEN 1 TABLET: 5; 325 TABLET ORAL at 09:11

## 2021-11-28 RX ADMIN — POLYETHYLENE GLYCOL 3350 17 G: 17 POWDER, FOR SOLUTION ORAL at 09:11

## 2021-11-28 RX ADMIN — HYDROCODONE BITARTRATE AND ACETAMINOPHEN 1 TABLET: 5; 325 TABLET ORAL at 03:11

## 2021-11-28 RX ADMIN — AMLODIPINE BESYLATE 5 MG: 5 TABLET ORAL at 09:11

## 2021-11-28 RX ADMIN — METOPROLOL SUCCINATE 50 MG: 50 TABLET, EXTENDED RELEASE ORAL at 09:11

## 2021-11-29 PROCEDURE — 97530 THERAPEUTIC ACTIVITIES: CPT | Mod: HCNC | Performed by: PHYSICAL THERAPIST

## 2021-11-29 PROCEDURE — 97530 THERAPEUTIC ACTIVITIES: CPT | Mod: HCNC

## 2021-11-29 PROCEDURE — 25000003 PHARM REV CODE 250: Mod: HCNC | Performed by: NURSE PRACTITIONER

## 2021-11-29 PROCEDURE — 97110 THERAPEUTIC EXERCISES: CPT | Mod: HCNC | Performed by: PHYSICAL THERAPIST

## 2021-11-29 PROCEDURE — 25000003 PHARM REV CODE 250: Mod: HCNC | Performed by: STUDENT IN AN ORGANIZED HEALTH CARE EDUCATION/TRAINING PROGRAM

## 2021-11-29 PROCEDURE — G0378 HOSPITAL OBSERVATION PER HR: HCPCS | Mod: HCNC

## 2021-11-29 PROCEDURE — 97110 THERAPEUTIC EXERCISES: CPT | Mod: HCNC

## 2021-11-29 RX ORDER — CHOLECALCIFEROL (VITAMIN D3) 25 MCG
1000 TABLET ORAL DAILY
Status: DISCONTINUED | OUTPATIENT
Start: 2021-11-29 | End: 2021-11-30 | Stop reason: HOSPADM

## 2021-11-29 RX ADMIN — METOPROLOL SUCCINATE 50 MG: 50 TABLET, EXTENDED RELEASE ORAL at 08:11

## 2021-11-29 RX ADMIN — POLYETHYLENE GLYCOL 3350 17 G: 17 POWDER, FOR SOLUTION ORAL at 08:11

## 2021-11-29 RX ADMIN — Medication 1000 UNITS: at 02:11

## 2021-11-29 RX ADMIN — AMLODIPINE BESYLATE 5 MG: 5 TABLET ORAL at 08:11

## 2021-11-29 RX ADMIN — HYDROCODONE BITARTRATE AND ACETAMINOPHEN 1 TABLET: 5; 325 TABLET ORAL at 03:11

## 2021-11-29 RX ADMIN — HYDROCODONE BITARTRATE AND ACETAMINOPHEN 1 TABLET: 5; 325 TABLET ORAL at 08:11

## 2021-11-30 ENCOUNTER — TELEPHONE (OUTPATIENT)
Dept: ORTHOPEDICS | Facility: CLINIC | Age: 86
End: 2021-11-30
Payer: MEDICARE

## 2021-11-30 VITALS
SYSTOLIC BLOOD PRESSURE: 121 MMHG | OXYGEN SATURATION: 97 % | TEMPERATURE: 98 F | HEART RATE: 64 BPM | RESPIRATION RATE: 17 BRPM | DIASTOLIC BLOOD PRESSURE: 58 MMHG | WEIGHT: 110.25 LBS | HEIGHT: 63 IN | BODY MASS INDEX: 19.54 KG/M2

## 2021-11-30 DIAGNOSIS — R10.2 PAIN IN PELVIS: Primary | ICD-10-CM

## 2021-11-30 DIAGNOSIS — M25.512 LEFT SHOULDER PAIN, UNSPECIFIED CHRONICITY: ICD-10-CM

## 2021-11-30 LAB
ANION GAP SERPL CALC-SCNC: 10 MMOL/L (ref 8–16)
BASOPHILS # BLD AUTO: 0.03 K/UL (ref 0–0.2)
BASOPHILS NFR BLD: 0.4 % (ref 0–1.9)
BUN SERPL-MCNC: 23 MG/DL (ref 10–30)
CALCIUM SERPL-MCNC: 8.7 MG/DL (ref 8.7–10.5)
CHLORIDE SERPL-SCNC: 104 MMOL/L (ref 95–110)
CO2 SERPL-SCNC: 22 MMOL/L (ref 23–29)
CREAT SERPL-MCNC: 0.6 MG/DL (ref 0.5–1.4)
DIFFERENTIAL METHOD: ABNORMAL
EOSINOPHIL # BLD AUTO: 0.1 K/UL (ref 0–0.5)
EOSINOPHIL NFR BLD: 1.1 % (ref 0–8)
ERYTHROCYTE [DISTWIDTH] IN BLOOD BY AUTOMATED COUNT: 19.5 % (ref 11.5–14.5)
EST. GFR  (AFRICAN AMERICAN): >60 ML/MIN/1.73 M^2
EST. GFR  (NON AFRICAN AMERICAN): >60 ML/MIN/1.73 M^2
GLUCOSE SERPL-MCNC: 93 MG/DL (ref 70–110)
HCT VFR BLD AUTO: 33.2 % (ref 37–48.5)
HGB BLD-MCNC: 9.9 G/DL (ref 12–16)
IMM GRANULOCYTES # BLD AUTO: 0.04 K/UL (ref 0–0.04)
IMM GRANULOCYTES NFR BLD AUTO: 0.6 % (ref 0–0.5)
LYMPHOCYTES # BLD AUTO: 1.1 K/UL (ref 1–4.8)
LYMPHOCYTES NFR BLD: 15.8 % (ref 18–48)
MCH RBC QN AUTO: 25.1 PG (ref 27–31)
MCHC RBC AUTO-ENTMCNC: 29.8 G/DL (ref 32–36)
MCV RBC AUTO: 84 FL (ref 82–98)
MONOCYTES # BLD AUTO: 0.5 K/UL (ref 0.3–1)
MONOCYTES NFR BLD: 6.7 % (ref 4–15)
NEUTROPHILS # BLD AUTO: 5.3 K/UL (ref 1.8–7.7)
NEUTROPHILS NFR BLD: 75.4 % (ref 38–73)
NRBC BLD-RTO: 0 /100 WBC
PLATELET # BLD AUTO: 261 K/UL (ref 150–450)
PMV BLD AUTO: 10.5 FL (ref 9.2–12.9)
POTASSIUM SERPL-SCNC: 4 MMOL/L (ref 3.5–5.1)
RBC # BLD AUTO: 3.95 M/UL (ref 4–5.4)
SODIUM SERPL-SCNC: 136 MMOL/L (ref 136–145)
WBC # BLD AUTO: 7.02 K/UL (ref 3.9–12.7)

## 2021-11-30 PROCEDURE — 97530 THERAPEUTIC ACTIVITIES: CPT | Mod: HCNC

## 2021-11-30 PROCEDURE — 80048 BASIC METABOLIC PNL TOTAL CA: CPT | Mod: HCNC | Performed by: NURSE PRACTITIONER

## 2021-11-30 PROCEDURE — 97110 THERAPEUTIC EXERCISES: CPT | Mod: HCNC

## 2021-11-30 PROCEDURE — 85025 COMPLETE CBC W/AUTO DIFF WBC: CPT | Mod: HCNC | Performed by: NURSE PRACTITIONER

## 2021-11-30 PROCEDURE — G0378 HOSPITAL OBSERVATION PER HR: HCPCS | Mod: HCNC

## 2021-11-30 PROCEDURE — 25000003 PHARM REV CODE 250: Mod: HCNC | Performed by: STUDENT IN AN ORGANIZED HEALTH CARE EDUCATION/TRAINING PROGRAM

## 2021-11-30 PROCEDURE — 36415 COLL VENOUS BLD VENIPUNCTURE: CPT | Mod: HCNC | Performed by: NURSE PRACTITIONER

## 2021-11-30 PROCEDURE — 25000003 PHARM REV CODE 250: Mod: HCNC | Performed by: NURSE PRACTITIONER

## 2021-11-30 RX ORDER — CHOLECALCIFEROL (VITAMIN D3) 25 MCG
1000 TABLET ORAL DAILY
Qty: 30 TABLET | Refills: 0 | Status: SHIPPED | OUTPATIENT
Start: 2021-12-01 | End: 2021-12-31

## 2021-11-30 RX ORDER — AMLODIPINE BESYLATE 5 MG/1
5 TABLET ORAL DAILY
Qty: 30 TABLET | Refills: 11 | Status: SHIPPED | OUTPATIENT
Start: 2021-12-01 | End: 2022-11-19

## 2021-11-30 RX ORDER — OXYCODONE AND ACETAMINOPHEN 7.5; 325 MG/1; MG/1
1 TABLET ORAL EVERY 6 HOURS PRN
Qty: 14 TABLET | Refills: 0 | Status: SHIPPED | OUTPATIENT
Start: 2021-11-30 | End: 2022-11-19

## 2021-11-30 RX ADMIN — METOPROLOL SUCCINATE 50 MG: 50 TABLET, EXTENDED RELEASE ORAL at 08:11

## 2021-11-30 RX ADMIN — Medication 1000 UNITS: at 08:11

## 2021-11-30 RX ADMIN — HYDROCODONE BITARTRATE AND ACETAMINOPHEN 1 TABLET: 5; 325 TABLET ORAL at 12:11

## 2021-11-30 RX ADMIN — POLYETHYLENE GLYCOL 3350 17 G: 17 POWDER, FOR SOLUTION ORAL at 08:11

## 2021-11-30 RX ADMIN — AMLODIPINE BESYLATE 5 MG: 5 TABLET ORAL at 08:11

## 2021-11-30 RX ADMIN — HYDROCODONE BITARTRATE AND ACETAMINOPHEN 1 TABLET: 5; 325 TABLET ORAL at 04:11

## 2021-12-07 ENCOUNTER — PES CALL (OUTPATIENT)
Dept: ADMINISTRATIVE | Facility: CLINIC | Age: 86
End: 2021-12-07
Payer: MEDICARE

## 2021-12-10 ENCOUNTER — PATIENT OUTREACH (OUTPATIENT)
Dept: ADMINISTRATIVE | Facility: HOSPITAL | Age: 86
End: 2021-12-10
Payer: MEDICARE

## 2021-12-13 ENCOUNTER — PATIENT MESSAGE (OUTPATIENT)
Dept: PHARMACY | Facility: CLINIC | Age: 86
End: 2021-12-13
Payer: MEDICARE

## 2021-12-15 ENCOUNTER — HOSPITAL ENCOUNTER (OUTPATIENT)
Dept: RADIOLOGY | Facility: HOSPITAL | Age: 86
Discharge: HOME | End: 2021-12-15
Attending: ORTHOPAEDIC SURGERY
Payer: MEDICARE

## 2021-12-15 ENCOUNTER — OFFICE VISIT (OUTPATIENT)
Dept: ORTHOPEDICS | Facility: CLINIC | Age: 86
End: 2021-12-15
Payer: MEDICARE

## 2021-12-15 VITALS
DIASTOLIC BLOOD PRESSURE: 64 MMHG | SYSTOLIC BLOOD PRESSURE: 126 MMHG | BODY MASS INDEX: 18.07 KG/M2 | HEART RATE: 59 BPM | WEIGHT: 102 LBS | HEIGHT: 63 IN

## 2021-12-15 DIAGNOSIS — R10.2 PAIN IN PELVIS: ICD-10-CM

## 2021-12-15 DIAGNOSIS — M25.512 LEFT SHOULDER PAIN, UNSPECIFIED CHRONICITY: Primary | ICD-10-CM

## 2021-12-15 DIAGNOSIS — G89.29 CHRONIC LEFT-SIDED LOW BACK PAIN WITHOUT SCIATICA: ICD-10-CM

## 2021-12-15 DIAGNOSIS — E55.9 VITAMIN D DEFICIENCY: ICD-10-CM

## 2021-12-15 DIAGNOSIS — S42.222D CLOSED 2-PART DISPLACED FRACTURE OF SURGICAL NECK OF LEFT HUMERUS WITH ROUTINE HEALING, SUBSEQUENT ENCOUNTER: Primary | ICD-10-CM

## 2021-12-15 DIAGNOSIS — M54.50 CHRONIC LEFT-SIDED LOW BACK PAIN WITHOUT SCIATICA: ICD-10-CM

## 2021-12-15 DIAGNOSIS — M25.512 LEFT SHOULDER PAIN, UNSPECIFIED CHRONICITY: ICD-10-CM

## 2021-12-15 DIAGNOSIS — S32.592A FRACTURE OF MULTIPLE PUBIC RAMI, LEFT, CLOSED, INITIAL ENCOUNTER: ICD-10-CM

## 2021-12-15 PROCEDURE — 99213 PR OFFICE/OUTPT VISIT, EST, LEVL III, 20-29 MIN: ICD-10-PCS | Mod: HCNC,S$GLB,, | Performed by: ORTHOPAEDIC SURGERY

## 2021-12-15 PROCEDURE — 73030 XR SHOULDER COMPLETE 2 OR MORE VIEWS LEFT: ICD-10-PCS | Mod: 26,HCNC,LT, | Performed by: RADIOLOGY

## 2021-12-15 PROCEDURE — 99999 PR PBB SHADOW E&M-EST. PATIENT-LVL III: CPT | Mod: PBBFAC,HCNC,, | Performed by: ORTHOPAEDIC SURGERY

## 2021-12-15 PROCEDURE — 99999 PR PBB SHADOW E&M-EST. PATIENT-LVL III: ICD-10-PCS | Mod: PBBFAC,HCNC,, | Performed by: ORTHOPAEDIC SURGERY

## 2021-12-15 PROCEDURE — 72190 X-RAY EXAM OF PELVIS: CPT | Mod: TC,HCNC

## 2021-12-15 PROCEDURE — 73030 X-RAY EXAM OF SHOULDER: CPT | Mod: 26,HCNC,LT, | Performed by: RADIOLOGY

## 2021-12-15 PROCEDURE — 99213 OFFICE O/P EST LOW 20 MIN: CPT | Mod: HCNC,S$GLB,, | Performed by: ORTHOPAEDIC SURGERY

## 2021-12-15 PROCEDURE — 73030 X-RAY EXAM OF SHOULDER: CPT | Mod: TC,HCNC,LT

## 2021-12-15 PROCEDURE — 72190 X-RAY EXAM OF PELVIS: CPT | Mod: 26,HCNC,, | Performed by: RADIOLOGY

## 2021-12-15 PROCEDURE — 72190 XR PELVIS AP INLET AND OUTLET: ICD-10-PCS | Mod: 26,HCNC,, | Performed by: RADIOLOGY

## 2021-12-15 RX ORDER — ACETAMINOPHEN 500 MG
500 TABLET ORAL
COMMUNITY
Start: 2021-11-30 | End: 2022-11-19

## 2021-12-15 RX ORDER — POLYETHYLENE GLYCOL 3350 17 G/17G
17 POWDER, FOR SOLUTION ORAL
COMMUNITY
Start: 2021-11-30 | End: 2022-11-19

## 2022-01-12 ENCOUNTER — OFFICE VISIT (OUTPATIENT)
Dept: ORTHOPEDICS | Facility: CLINIC | Age: 87
End: 2022-01-12
Payer: MEDICARE

## 2022-01-12 ENCOUNTER — TELEPHONE (OUTPATIENT)
Dept: ORTHOPEDICS | Facility: CLINIC | Age: 87
End: 2022-01-12

## 2022-01-12 ENCOUNTER — HOSPITAL ENCOUNTER (OUTPATIENT)
Dept: RADIOLOGY | Facility: HOSPITAL | Age: 87
Discharge: HOME | End: 2022-01-12
Attending: ORTHOPAEDIC SURGERY
Payer: MEDICARE

## 2022-01-12 VITALS
HEART RATE: 74 BPM | DIASTOLIC BLOOD PRESSURE: 77 MMHG | WEIGHT: 102 LBS | HEIGHT: 63 IN | BODY MASS INDEX: 18.07 KG/M2 | SYSTOLIC BLOOD PRESSURE: 143 MMHG

## 2022-01-12 DIAGNOSIS — M25.512 LEFT SHOULDER PAIN, UNSPECIFIED CHRONICITY: ICD-10-CM

## 2022-01-12 DIAGNOSIS — S42.222D CLOSED 2-PART DISPLACED FRACTURE OF SURGICAL NECK OF LEFT HUMERUS WITH ROUTINE HEALING, SUBSEQUENT ENCOUNTER: ICD-10-CM

## 2022-01-12 DIAGNOSIS — R10.2 PAIN IN PELVIS: ICD-10-CM

## 2022-01-12 DIAGNOSIS — S32.592A FRACTURE OF MULTIPLE PUBIC RAMI, LEFT, CLOSED, INITIAL ENCOUNTER: Primary | ICD-10-CM

## 2022-01-12 PROCEDURE — 1125F AMNT PAIN NOTED PAIN PRSNT: CPT | Mod: HCNC,CPTII,S$GLB, | Performed by: ORTHOPAEDIC SURGERY

## 2022-01-12 PROCEDURE — 72170 XR PELVIS ROUTINE AP: ICD-10-PCS | Mod: 26,HCNC,, | Performed by: RADIOLOGY

## 2022-01-12 PROCEDURE — 1160F PR REVIEW ALL MEDS BY PRESCRIBER/CLIN PHARMACIST DOCUMENTED: ICD-10-PCS | Mod: HCNC,CPTII,S$GLB, | Performed by: ORTHOPAEDIC SURGERY

## 2022-01-12 PROCEDURE — 1125F PR PAIN SEVERITY QUANTIFIED, PAIN PRESENT: ICD-10-PCS | Mod: HCNC,CPTII,S$GLB, | Performed by: ORTHOPAEDIC SURGERY

## 2022-01-12 PROCEDURE — 99213 OFFICE O/P EST LOW 20 MIN: CPT | Mod: HCNC,S$GLB,, | Performed by: ORTHOPAEDIC SURGERY

## 2022-01-12 PROCEDURE — 1100F PR PT FALLS ASSESS DOC 2+ FALLS/FALL W/INJURY/YR: ICD-10-PCS | Mod: HCNC,CPTII,S$GLB, | Performed by: ORTHOPAEDIC SURGERY

## 2022-01-12 PROCEDURE — 73030 XR SHOULDER COMPLETE 2 OR MORE VIEWS LEFT: ICD-10-PCS | Mod: 26,HCNC,LT, | Performed by: RADIOLOGY

## 2022-01-12 PROCEDURE — 1100F PTFALLS ASSESS-DOCD GE2>/YR: CPT | Mod: HCNC,CPTII,S$GLB, | Performed by: ORTHOPAEDIC SURGERY

## 2022-01-12 PROCEDURE — 1160F RVW MEDS BY RX/DR IN RCRD: CPT | Mod: HCNC,CPTII,S$GLB, | Performed by: ORTHOPAEDIC SURGERY

## 2022-01-12 PROCEDURE — 1159F PR MEDICATION LIST DOCUMENTED IN MEDICAL RECORD: ICD-10-PCS | Mod: HCNC,CPTII,S$GLB, | Performed by: ORTHOPAEDIC SURGERY

## 2022-01-12 PROCEDURE — 73030 X-RAY EXAM OF SHOULDER: CPT | Mod: 26,HCNC,LT, | Performed by: RADIOLOGY

## 2022-01-12 PROCEDURE — 99999 PR PBB SHADOW E&M-EST. PATIENT-LVL IV: ICD-10-PCS | Mod: PBBFAC,HCNC,, | Performed by: ORTHOPAEDIC SURGERY

## 2022-01-12 PROCEDURE — 3288F PR FALLS RISK ASSESSMENT DOCUMENTED: ICD-10-PCS | Mod: HCNC,CPTII,S$GLB, | Performed by: ORTHOPAEDIC SURGERY

## 2022-01-12 PROCEDURE — 73030 X-RAY EXAM OF SHOULDER: CPT | Mod: TC,HCNC,LT

## 2022-01-12 PROCEDURE — 99999 PR PBB SHADOW E&M-EST. PATIENT-LVL IV: CPT | Mod: PBBFAC,HCNC,, | Performed by: ORTHOPAEDIC SURGERY

## 2022-01-12 PROCEDURE — 1159F MED LIST DOCD IN RCRD: CPT | Mod: HCNC,CPTII,S$GLB, | Performed by: ORTHOPAEDIC SURGERY

## 2022-01-12 PROCEDURE — 72170 X-RAY EXAM OF PELVIS: CPT | Mod: TC,HCNC

## 2022-01-12 PROCEDURE — 99213 PR OFFICE/OUTPT VISIT, EST, LEVL III, 20-29 MIN: ICD-10-PCS | Mod: HCNC,S$GLB,, | Performed by: ORTHOPAEDIC SURGERY

## 2022-01-12 PROCEDURE — 3288F FALL RISK ASSESSMENT DOCD: CPT | Mod: HCNC,CPTII,S$GLB, | Performed by: ORTHOPAEDIC SURGERY

## 2022-01-12 PROCEDURE — 72170 X-RAY EXAM OF PELVIS: CPT | Mod: 26,HCNC,, | Performed by: RADIOLOGY

## 2022-01-12 RX ORDER — NYSTATIN 100000 [USP'U]/ML
SUSPENSION ORAL
COMMUNITY
Start: 2022-01-12 | End: 2022-11-19

## 2022-01-12 NOTE — PROGRESS NOTES
Subjective:     Patient ID: Francie Olsen is a 89 y.o. female.    Chief Complaint: Pain of the Left Hip and Pain of the Left Upper Arm      HPI:  The patient is brought by her son from her nursing center.  She is here for follow-up of left proximal humeral and pelvic fractures.  Complains of intermittent pain at the left shoulder.  Therapy helps a lot.  She still takes oxycodone for that.  No pelvic pain.    Past Medical History:   Diagnosis Date    Acute blood loss anemia 2021    Chronic low back pain     Chronic mid back pain     Familial tremor     Hearing loss     Hyperlipemia     Hypertension     Osteoarthritis     Osteoporosis, unspecified     SCC (squamous cell carcinoma), hand 2015    hand    Squamous cell carcinoma 9-10yrs ago    left ear     Past Surgical History:   Procedure Laterality Date    CATARACT EXTRACTION      FEMUR FRACTURE SURGERY Right     HIP FRACTURE SURGERY Left aprox 2010    implant for nerve pain      YAG OU       Family History   Problem Relation Age of Onset    Cancer Mother          54 uterine    Diabetes Son     Blindness Neg Hx     Cataracts Neg Hx     Glaucoma Neg Hx     Hypertension Neg Hx     Macular degeneration Neg Hx     Retinal detachment Neg Hx     Strabismus Neg Hx     Stroke Neg Hx     Thyroid disease Neg Hx     Melanoma Neg Hx      Social History     Socioeconomic History    Marital status:    Tobacco Use    Smoking status: Former Smoker    Smokeless tobacco: Never Used   Substance and Sexual Activity    Alcohol use: No     Alcohol/week: 0.0 standard drinks    Drug use: No    Sexual activity: Never     Birth control/protection: Post-menopausal   Other Topics Concern    Are you pregnant or think you may be? No    Breast-feeding No     Medication List with Changes/Refills   Current Medications    ACETAMINOPHEN (TYLENOL) 500 MG TABLET    Take 500 mg by mouth.    AMLODIPINE (NORVASC) 5 MG TABLET    Take 1 tablet (5 mg  "total) by mouth once daily.    BACK BRACE MISC    1 Device by Misc.(Non-Drug; Combo Route) route daily as needed.    METOPROLOL SUCCINATE (TOPROL-XL) 50 MG 24 HR TABLET    TAKE 1 TABLET EVERY DAY    MULTI-VITAMIN TABLET    Take 1 tablet by mouth Daily. 1 Tablet Oral Every day    NAPHAZOLINE HCL (CLEAR EYES OPHT)    Apply to eye.    NYSTATIN (MYCOSTATIN) 100,000 UNIT/ML SUSPENSION        ONDANSETRON (ZOFRAN) 4 MG TABLET    Take 1 tablet (4 mg total) by mouth every 6 (six) hours as needed for Nausea.    OXYCODONE-ACETAMINOPHEN (PERCOCET) 7.5-325 MG PER TABLET    Take 1 tablet by mouth every 6 (six) hours as needed for Pain.    POLYETHYLENE GLYCOL (GLYCOLAX) 17 GRAM/DOSE POWDER    Take 17 g by mouth.    SODIUM CHLORIDE 2% (ABELARDO 128) 2 % OPHTHALMIC SOLUTION    1 drop as needed.    TRIAMCINOLONE ACETONIDE 0.1% (KENALOG) 0.1 % CREAM    Apply topically 2 (two) times daily.     Review of patient's allergies indicates:   Allergen Reactions    Codeine Nausea And Vomiting    Demerol  [meperidine] Nausea And Vomiting     ROS     Objective:   Body mass index is 18.07 kg/m².  Vitals:    01/12/22 1023   BP: (!) 143/77   Pulse: 74   Weight: 46.3 kg (102 lb)   Height: 5' 3" (1.6 m)   PainSc:   3   PainLoc: Hip       PHYSICAL EXAM:  Well-developed elderly female in no acute distress.  She comes in by wheelchair.    Examination of left shoulder reveals slight tenderness at the humeral head.  She has 90° of active assisted elevation.    X-rays of the left shoulder reveal progressive consolidation of the comminuted humeral head fracture with much callus formation.    X-rays of the pelvis reveal a healed fracture at the left superior pubic ramus    Fracture of multiple pubic rami, left, closed, initial encounter    Closed 2-part displaced fracture of surgical neck of left humerus with routine healing, subsequent encounter        Plan:  The patient's fractures have healed.  Discussed that with her and her son.  Continue therapy to " maximum benefit for range of motion and strengthening.  Return here for follow-up of needed.  Recommended that her primary care physician take over prescribing any analgesics that she may need since she will not be returning here.    Patient Instructions   X-rays with healed fractures of the left proximal humerus and pelvis.  Improving range of motion.  Continue therapy for maximal benefit for her mobility and strength.  Medications by primary care physician.  No orthopedic follow-up planned.             Iker Nelson MD, FAAOS [facility], Orthopedic Trauma Service  [place]

## 2022-01-12 NOTE — TELEPHONE ENCOUNTER
Spoke to Stacy kamara let her know that Yes Dr. Nelson stated that the fracture is healed an that she is to just work on range of motion and strengthening. She verbalized understanding.       ----- Message from Wendy Trinidad sent at 1/12/2022  2:27 PM CST -----  Contact: Stacy Reed/Manager at Physical therapy  Stacy would like a call back at  in regard to getting some clarification on some orders that they have for the patient.      [facility]     [place]

## 2022-01-12 NOTE — PATIENT INSTRUCTIONS
X-rays with healed fractures of the left proximal humerus and pelvis.  Improving range of motion.  Continue therapy for maximal benefit for her mobility and strength.  Medications by primary care physician.  No orthopedic follow-up planned.

## 2022-02-24 ENCOUNTER — PATIENT MESSAGE (OUTPATIENT)
Dept: OPHTHALMOLOGY | Facility: CLINIC | Age: 87
End: 2022-02-24
Payer: MEDICARE

## 2022-03-04 ENCOUNTER — TELEPHONE (OUTPATIENT)
Dept: OPHTHALMOLOGY | Facility: CLINIC | Age: 87
End: 2022-03-04
Payer: MEDICARE

## 2022-03-04 NOTE — TELEPHONE ENCOUNTER
Add power decreased to 3.25 and mailed script to patients son      ----- Message from Nesha Ruggiero sent at 3/4/2022 10:38 AM CST -----  Contact: Herve Olsen -son  Calling in regards to her script for her glasses. Please call [phone]

## 2022-03-10 ENCOUNTER — TELEPHONE (OUTPATIENT)
Dept: OPHTHALMOLOGY | Facility: CLINIC | Age: 87
End: 2022-03-10
Payer: MEDICARE

## 2022-03-10 ENCOUNTER — PATIENT MESSAGE (OUTPATIENT)
Dept: OPHTHALMOLOGY | Facility: CLINIC | Age: 87
End: 2022-03-10
Payer: MEDICARE

## 2022-03-10 NOTE — TELEPHONE ENCOUNTER
Spoke to pt about her glasses rx. A copy was mailed out to her. Told pt to call back if she doesn't receive it by Friday. Pt asked me to call and talk to her son. I spoke with pt's son who ha power of  over her and told him about the rx. Told pt he can get a copy of the revised script in [facility].

## 2022-05-18 ENCOUNTER — DOCUMENT SCAN (OUTPATIENT)
Dept: HOME HEALTH SERVICES | Facility: HOSPITAL | Age: 87
End: 2022-05-18
Payer: MEDICARE

## 2022-05-24 ENCOUNTER — TELEPHONE (OUTPATIENT)
Dept: FAMILY MEDICINE | Facility: CLINIC | Age: 87
End: 2022-05-24
Payer: MEDICARE

## 2022-05-24 NOTE — TELEPHONE ENCOUNTER
Called and spoke with Wendie from [facility] .  States she unaware of who has  signed previously orders because pt was discharged from nursing home to Home health on 03/07/2022. Wendie states she understanding if you refused.

## 2022-05-24 NOTE — TELEPHONE ENCOUNTER
Please call [facility].  Advise I received Home Health document to sign for supplemental orders for 3/27/2022 - 4/17/2022.  Please ask who has been signing off on Home Health for her as I have not seen patient since March 2020.  Thanks.

## 2022-06-09 ENCOUNTER — PATIENT OUTREACH (OUTPATIENT)
Dept: ADMINISTRATIVE | Facility: HOSPITAL | Age: 87
End: 2022-06-09
Payer: MEDICARE

## 2022-06-09 NOTE — PROGRESS NOTES
Called #[phone]. Patient answered and stated that she needs to talk to her son to see when he can bring the patient to the doctor. Patient asked me to call her back tomorrow.

## 2022-08-10 ENCOUNTER — TELEPHONE (OUTPATIENT)
Dept: ADMINISTRATIVE | Facility: HOSPITAL | Age: 87
End: 2022-08-10
Payer: MEDICARE

## 2022-09-16 ENCOUNTER — PES CALL (OUTPATIENT)
Dept: ADMINISTRATIVE | Facility: CLINIC | Age: 87
End: 2022-09-16
Payer: MEDICARE

## 2022-10-20 ENCOUNTER — PATIENT MESSAGE (OUTPATIENT)
Dept: ADMINISTRATIVE | Facility: HOSPITAL | Age: 87
End: 2022-10-20
Payer: MEDICARE

## 2022-11-07 ENCOUNTER — OFFICE VISIT (OUTPATIENT)
Dept: OPHTHALMOLOGY | Facility: CLINIC | Age: 87
End: 2022-11-07
Payer: MEDICARE

## 2022-11-07 ENCOUNTER — TELEPHONE (OUTPATIENT)
Dept: OPHTHALMOLOGY | Facility: CLINIC | Age: 87
End: 2022-11-07

## 2022-11-07 DIAGNOSIS — Z96.1 PSEUDOPHAKIA: ICD-10-CM

## 2022-11-07 DIAGNOSIS — H35.3132 INTERMEDIATE STAGE NONEXUDATIVE AGE-RELATED MACULAR DEGENERATION OF BOTH EYES: Primary | ICD-10-CM

## 2022-11-07 PROCEDURE — 92134 CPTRZ OPH DX IMG PST SGM RTA: CPT | Mod: S$GLB,,, | Performed by: OPHTHALMOLOGY

## 2022-11-07 PROCEDURE — 99999 PR PBB SHADOW E&M-EST. PATIENT-LVL I: ICD-10-PCS | Mod: PBBFAC,,, | Performed by: OPHTHALMOLOGY

## 2022-11-07 PROCEDURE — 92014 COMPRE OPH EXAM EST PT 1/>: CPT | Mod: S$GLB,,, | Performed by: OPHTHALMOLOGY

## 2022-11-07 PROCEDURE — 92014 PR EYE EXAM, EST PATIENT,COMPREHESV: ICD-10-PCS | Mod: S$GLB,,, | Performed by: OPHTHALMOLOGY

## 2022-11-07 PROCEDURE — 1159F PR MEDICATION LIST DOCUMENTED IN MEDICAL RECORD: ICD-10-PCS | Mod: CPTII,S$GLB,, | Performed by: OPHTHALMOLOGY

## 2022-11-07 PROCEDURE — 1160F PR REVIEW ALL MEDS BY PRESCRIBER/CLIN PHARMACIST DOCUMENTED: ICD-10-PCS | Mod: CPTII,S$GLB,, | Performed by: OPHTHALMOLOGY

## 2022-11-07 PROCEDURE — 1159F MED LIST DOCD IN RCRD: CPT | Mod: CPTII,S$GLB,, | Performed by: OPHTHALMOLOGY

## 2022-11-07 PROCEDURE — 1160F RVW MEDS BY RX/DR IN RCRD: CPT | Mod: CPTII,S$GLB,, | Performed by: OPHTHALMOLOGY

## 2022-11-07 PROCEDURE — 92134 POSTERIOR SEGMENT OCT RETINA (OCULAR COHERENCE TOMOGRAPHY)-BOTH EYES: ICD-10-PCS | Mod: S$GLB,,, | Performed by: OPHTHALMOLOGY

## 2022-11-07 PROCEDURE — 99999 PR PBB SHADOW E&M-EST. PATIENT-LVL I: CPT | Mod: PBBFAC,,, | Performed by: OPHTHALMOLOGY

## 2022-11-07 NOTE — PROGRESS NOTES
SUBJECTIVE  Virginia NORA Olsen is 89 y.o. female  Corrected distance visual acuity was 20/200 in the right eye and 20/60- in the left eye.   Chief Complaint   Patient presents with    Annual Exam     MOCT           HPI     Annual Exam     Additional comments: MOCT            Comments    States that her vision has gotten worse and thinks that its from her   macular degeneration.     1. PCIOL OU  2. YAG OU  3. Dry AMD OS>OD  4. Dry Eyes  5. K edema OD (not interested in K consult)  6. Refractive Error          Last edited by Consuelo Price on 11/7/2022  8:42 AM.         Assessment /Plan :  1. Intermediate stage nonexudative age-related macular degeneration of both eyes - monitor for now, recommend a Low Vision Consult with Dr. Eufemia Lazar   2. Pseudophakia  -- Condition stable, no therapeutic change required. Monitoring routinely.       RTC in 1 year for dilation and MOCT or prn any changes

## 2022-11-07 NOTE — TELEPHONE ENCOUNTER
The patient has been notified of her appointment scheduled with Dr. Lazar on 11/23/22 at 10:15 am for a low vision consult. The patient's information has been faxed to Dr. Lazar's office.

## 2022-11-08 ENCOUNTER — TELEPHONE (OUTPATIENT)
Dept: ADMINISTRATIVE | Facility: HOSPITAL | Age: 87
End: 2022-11-08
Payer: MEDICARE

## 2022-11-19 ENCOUNTER — HOSPITAL ENCOUNTER (OUTPATIENT)
Facility: HOSPITAL | Age: 87
Discharge: NURSING, REHAB OR LONG-TERM CARE | End: 2022-11-22
Attending: EMERGENCY MEDICINE | Admitting: STUDENT IN AN ORGANIZED HEALTH CARE EDUCATION/TRAINING PROGRAM
Payer: MEDICARE

## 2022-11-19 DIAGNOSIS — R00.0 TACHYCARDIA: ICD-10-CM
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
## 2022-11-19 DIAGNOSIS — S22.000A COMPRESSION FRACTURE OF BODY OF THORACIC VERTEBRA: ICD-10-CM

## 2022-11-19 DIAGNOSIS — R55 NEAR SYNCOPE: ICD-10-CM

## 2022-11-19 DIAGNOSIS — R07.9 CHEST PAIN: ICD-10-CM

## 2022-11-19 DIAGNOSIS — I47.10 SVT (SUPRAVENTRICULAR TACHYCARDIA): Primary | ICD-10-CM

## 2022-11-19 LAB
ALBUMIN SERPL BCP-MCNC: 3.6 G/DL (ref 3.5–5.2)
ALP SERPL-CCNC: 77 U/L (ref 55–135)
ALT SERPL W/O P-5'-P-CCNC: 13 U/L (ref 10–44)
ANION GAP SERPL CALC-SCNC: 13 MMOL/L (ref 8–16)
AST SERPL-CCNC: 18 U/L (ref 10–40)
BASOPHILS # BLD AUTO: 0.04 K/UL (ref 0–0.2)
BASOPHILS NFR BLD: 0.4 % (ref 0–1.9)
BILIRUB SERPL-MCNC: 0.5 MG/DL (ref 0.1–1)
BNP SERPL-MCNC: 158 PG/ML (ref 0–99)
BUN SERPL-MCNC: 8 MG/DL (ref 10–30)
CALCIUM SERPL-MCNC: 9.3 MG/DL (ref 8.7–10.5)
CHLORIDE SERPL-SCNC: 103 MMOL/L (ref 95–110)
CO2 SERPL-SCNC: 22 MMOL/L (ref 23–29)
CREAT SERPL-MCNC: 0.7 MG/DL (ref 0.5–1.4)
DIFFERENTIAL METHOD: ABNORMAL
EOSINOPHIL # BLD AUTO: 0.1 K/UL (ref 0–0.5)
EOSINOPHIL NFR BLD: 0.5 % (ref 0–8)
ERYTHROCYTE [DISTWIDTH] IN BLOOD BY AUTOMATED COUNT: 14.8 % (ref 11.5–14.5)
EST. GFR  (NO RACE VARIABLE): >60 ML/MIN/1.73 M^2
GLUCOSE SERPL-MCNC: 104 MG/DL (ref 70–110)
HCT VFR BLD AUTO: 38 % (ref 37–48.5)
HGB BLD-MCNC: 12.1 G/DL (ref 12–16)
IMM GRANULOCYTES # BLD AUTO: 0.08 K/UL (ref 0–0.04)
IMM GRANULOCYTES NFR BLD AUTO: 0.8 % (ref 0–0.5)
LYMPHOCYTES # BLD AUTO: 0.7 K/UL (ref 1–4.8)
LYMPHOCYTES NFR BLD: 6.9 % (ref 18–48)
MCH RBC QN AUTO: 27.4 PG (ref 27–31)
MCHC RBC AUTO-ENTMCNC: 31.8 G/DL (ref 32–36)
MCV RBC AUTO: 86 FL (ref 82–98)
MONOCYTES # BLD AUTO: 0.5 K/UL (ref 0.3–1)
MONOCYTES NFR BLD: 4.5 % (ref 4–15)
NEUTROPHILS # BLD AUTO: 8.8 K/UL (ref 1.8–7.7)
NEUTROPHILS NFR BLD: 86.9 % (ref 38–73)
NRBC BLD-RTO: 0 /100 WBC
PLATELET # BLD AUTO: 235 K/UL (ref 150–450)
PMV BLD AUTO: 10 FL (ref 9.2–12.9)
POTASSIUM SERPL-SCNC: 3.6 MMOL/L (ref 3.5–5.1)
PROT SERPL-MCNC: 7.8 G/DL (ref 6–8.4)
RBC # BLD AUTO: 4.42 M/UL (ref 4–5.4)
SODIUM SERPL-SCNC: 138 MMOL/L (ref 136–145)
TROPONIN I SERPL DL<=0.01 NG/ML-MCNC: 0.15 NG/ML (ref 0–0.03)
WBC # BLD AUTO: 10.13 K/UL (ref 3.9–12.7)

## 2022-11-19 PROCEDURE — 96376 TX/PRO/DX INJ SAME DRUG ADON: CPT

## 2022-11-19 PROCEDURE — G0378 HOSPITAL OBSERVATION PER HR: HCPCS

## 2022-11-19 PROCEDURE — 93010 ELECTROCARDIOGRAM REPORT: CPT | Mod: ,,, | Performed by: INTERNAL MEDICINE

## 2022-11-19 PROCEDURE — 94761 N-INVAS EAR/PLS OXIMETRY MLT: CPT

## 2022-11-19 PROCEDURE — 93005 ELECTROCARDIOGRAM TRACING: CPT

## 2022-11-19 PROCEDURE — 84484 ASSAY OF TROPONIN QUANT: CPT | Performed by: EMERGENCY MEDICINE

## 2022-11-19 PROCEDURE — 83880 ASSAY OF NATRIURETIC PEPTIDE: CPT | Performed by: EMERGENCY MEDICINE

## 2022-11-19 PROCEDURE — 99285 EMERGENCY DEPT VISIT HI MDM: CPT | Mod: 25

## 2022-11-19 PROCEDURE — 25000003 PHARM REV CODE 250: Performed by: EMERGENCY MEDICINE

## 2022-11-19 PROCEDURE — 63600175 PHARM REV CODE 636 W HCPCS: Performed by: EMERGENCY MEDICINE

## 2022-11-19 PROCEDURE — 63600175 PHARM REV CODE 636 W HCPCS: Performed by: STUDENT IN AN ORGANIZED HEALTH CARE EDUCATION/TRAINING PROGRAM

## 2022-11-19 PROCEDURE — 96374 THER/PROPH/DIAG INJ IV PUSH: CPT

## 2022-11-19 PROCEDURE — 12001 RPR S/N/AX/GEN/TRNK 2.5CM/<: CPT

## 2022-11-19 PROCEDURE — 96375 TX/PRO/DX INJ NEW DRUG ADDON: CPT

## 2022-11-19 PROCEDURE — 80053 COMPREHEN METABOLIC PANEL: CPT | Performed by: EMERGENCY MEDICINE

## 2022-11-19 PROCEDURE — 93010 EKG 12-LEAD: ICD-10-PCS | Mod: ,,, | Performed by: INTERNAL MEDICINE

## 2022-11-19 PROCEDURE — 25000003 PHARM REV CODE 250: Performed by: STUDENT IN AN ORGANIZED HEALTH CARE EDUCATION/TRAINING PROGRAM

## 2022-11-19 PROCEDURE — 85025 COMPLETE CBC W/AUTO DIFF WBC: CPT | Performed by: EMERGENCY MEDICINE

## 2022-11-19 RX ORDER — ACETAMINOPHEN 500 MG
500 TABLET ORAL EVERY 6 HOURS PRN
Status: DISCONTINUED | OUTPATIENT
Start: 2022-11-19 | End: 2022-11-19

## 2022-11-19 RX ORDER — GLUCAGON 1 MG
1 KIT INJECTION
Status: DISCONTINUED | OUTPATIENT
Start: 2022-11-19 | End: 2022-11-22 | Stop reason: HOSPADM

## 2022-11-19 RX ORDER — OXYCODONE HYDROCHLORIDE 5 MG/1
5 TABLET ORAL EVERY 6 HOURS PRN
Status: DISCONTINUED | OUTPATIENT
Start: 2022-11-19 | End: 2022-11-22

## 2022-11-19 RX ORDER — MORPHINE SULFATE 4 MG/ML
2 INJECTION, SOLUTION INTRAMUSCULAR; INTRAVENOUS
Status: COMPLETED | OUTPATIENT
Start: 2022-11-19 | End: 2022-11-19

## 2022-11-19 RX ORDER — NALOXONE HCL 0.4 MG/ML
0.02 VIAL (ML) INJECTION
Status: DISCONTINUED | OUTPATIENT
Start: 2022-11-19 | End: 2022-11-22 | Stop reason: HOSPADM

## 2022-11-19 RX ORDER — SODIUM CHLORIDE 0.9 % (FLUSH) 0.9 %
10 SYRINGE (ML) INJECTION EVERY 12 HOURS PRN
Status: DISCONTINUED | OUTPATIENT
Start: 2022-11-19 | End: 2022-11-22 | Stop reason: HOSPADM

## 2022-11-19 RX ORDER — IBUPROFEN 200 MG
24 TABLET ORAL
Status: DISCONTINUED | OUTPATIENT
Start: 2022-11-19 | End: 2022-11-22 | Stop reason: HOSPADM

## 2022-11-19 RX ORDER — MORPHINE SULFATE 2 MG/ML
1 INJECTION, SOLUTION INTRAMUSCULAR; INTRAVENOUS EVERY 6 HOURS PRN
Status: DISCONTINUED | OUTPATIENT
Start: 2022-11-19 | End: 2022-11-22

## 2022-11-19 RX ORDER — METOPROLOL TARTRATE 1 MG/ML
5 INJECTION, SOLUTION INTRAVENOUS
Status: COMPLETED | OUTPATIENT
Start: 2022-11-19 | End: 2022-11-19

## 2022-11-19 RX ORDER — SODIUM CHLORIDE 0.9 % (FLUSH) 0.9 %
10 SYRINGE (ML) INJECTION
Status: DISCONTINUED | OUTPATIENT
Start: 2022-11-19 | End: 2022-11-22 | Stop reason: HOSPADM

## 2022-11-19 RX ORDER — TALC
6 POWDER (GRAM) TOPICAL NIGHTLY PRN
Status: DISCONTINUED | OUTPATIENT
Start: 2022-11-19 | End: 2022-11-22 | Stop reason: HOSPADM

## 2022-11-19 RX ORDER — ACETAMINOPHEN 500 MG
500 TABLET ORAL EVERY 6 HOURS PRN
Status: DISCONTINUED | OUTPATIENT
Start: 2022-11-19 | End: 2022-11-22 | Stop reason: HOSPADM

## 2022-11-19 RX ORDER — LIDOCAINE HCL/EPINEPHRINE/PF 2%-1:200K
1 VIAL (ML) INJECTION ONCE
Status: DISCONTINUED | OUTPATIENT
Start: 2022-11-19 | End: 2022-11-19 | Stop reason: CLARIF

## 2022-11-19 RX ORDER — IBUPROFEN 200 MG
16 TABLET ORAL
Status: DISCONTINUED | OUTPATIENT
Start: 2022-11-19 | End: 2022-11-22 | Stop reason: HOSPADM

## 2022-11-19 RX ADMIN — LIDOCAINE HYDROCHLORIDE 1 ML: 10; .005 INJECTION, SOLUTION EPIDURAL; INFILTRATION; INTRACAUDAL; PERINEURAL at 12:11

## 2022-11-19 RX ADMIN — OXYCODONE HYDROCHLORIDE 5 MG: 5 TABLET ORAL at 09:11

## 2022-11-19 RX ADMIN — OXYCODONE HYDROCHLORIDE 5 MG: 5 TABLET ORAL at 03:11

## 2022-11-19 RX ADMIN — MORPHINE SULFATE 1 MG: 2 INJECTION, SOLUTION INTRAMUSCULAR; INTRAVENOUS at 06:11

## 2022-11-19 RX ADMIN — MORPHINE SULFATE 2 MG: 4 INJECTION INTRAVENOUS at 11:11

## 2022-11-19 RX ADMIN — METOROPROLOL TARTRATE 5 MG: 5 INJECTION, SOLUTION INTRAVENOUS at 09:11

## 2022-11-19 RX ADMIN — Medication 6 MG: at 08:11

## 2022-11-19 NOTE — SUBJECTIVE & OBJECTIVE
Past Medical History:   Diagnosis Date    Acute blood loss anemia 11/24/2021    Chronic low back pain     Chronic mid back pain     Familial tremor     Hearing loss     Hyperlipemia     Hypertension     Osteoarthritis     Osteoporosis, unspecified     SCC (squamous cell carcinoma), hand 2015    hand    Squamous cell carcinoma 9-10yrs ago    left ear       Past Surgical History:   Procedure Laterality Date    CATARACT EXTRACTION      FEMUR FRACTURE SURGERY Right     HIP FRACTURE SURGERY Left aprox 2010    implant for nerve pain      YAG OU         Review of patient's allergies indicates:   Allergen Reactions    Codeine Nausea And Vomiting and Nausea Only    Meperidine Nausea And Vomiting and Nausea Only       No current facility-administered medications on file prior to encounter.     Current Outpatient Medications on File Prior to Encounter   Medication Sig    naloxegoL (MOVANTIK) 25 mg tablet Take 1 tablet by mouth once a day as needed for pain (Patient taking differently: Take 25 mg by mouth once daily.)    oxyCODONE (ROXICODONE) 15 MG Tab Take 1 tablet by mouth twice a day as needed for pain    [START ON 11/28/2022] oxyCODONE (ROXICODONE) 15 MG Tab Take 1 tablet by mouth twice a day as needed for pain    [DISCONTINUED] acetaminophen (TYLENOL) 500 MG tablet Take 500 mg by mouth.    [DISCONTINUED] amLODIPine (NORVASC) 5 MG tablet Take 1 tablet (5 mg total) by mouth once daily.    [DISCONTINUED] back brace Misc 1 Device by Misc.(Non-Drug; Combo Route) route daily as needed.    [DISCONTINUED] metoprolol succinate (TOPROL-XL) 50 MG 24 hr tablet TAKE 1 TABLET EVERY DAY    [DISCONTINUED] Multi-Vitamin tablet Take 1 tablet by mouth Daily. 1 Tablet Oral Every day    [DISCONTINUED] NAPHAZOLINE HCL (CLEAR EYES OPHT) Apply to eye.    [DISCONTINUED] nystatin (MYCOSTATIN) 100,000 unit/mL suspension     [DISCONTINUED] ondansetron (ZOFRAN) 4 MG tablet Take 1 tablet (4 mg total) by mouth every 6 (six) hours as needed for Nausea.     [DISCONTINUED] oxyCODONE (ROXICODONE) 15 MG Tab Take 1 tablet by mouth three times a day as needed for pain    [DISCONTINUED] oxyCODONE-acetaminophen (PERCOCET) 7.5-325 mg per tablet Take 1 tablet by mouth every 6 (six) hours as needed for Pain.    [DISCONTINUED] polyethylene glycol (GLYCOLAX) 17 gram/dose powder Take 17 g by mouth.    [DISCONTINUED] sodium chloride 2% (ABELARDO 128) 2 % ophthalmic solution 1 drop as needed.    [DISCONTINUED] triamcinolone acetonide 0.1% (KENALOG) 0.1 % cream Apply topically 2 (two) times daily.     Family History       Problem Relation (Age of Onset)    Cancer Mother    Diabetes Son          Tobacco Use    Smoking status: Former    Smokeless tobacco: Never   Substance and Sexual Activity    Alcohol use: No     Alcohol/week: 0.0 standard drinks    Drug use: No    Sexual activity: Never     Birth control/protection: Post-menopausal     Review of Systems    Constitutional:  Negative for fever.   HENT:  Negative for sore throat.    Respiratory:  Negative for shortness of breath.    Cardiovascular:  Negative for chest pain.   Gastrointestinal:  Positive for abdominal pain. Negative for nausea.   Genitourinary:  Negative for dysuria.   Musculoskeletal:  Positive for neck pain. Negative for back pain.        (-) pelvic pain   Skin:  Negative for rash.   Neurological:   Negative for syncope and weakness.   Hematological:  Does not bruise/bleed easily.   All other systems reviewed and are negative.    Objective:     Vital Signs (Most Recent):  Temp: 98.1 °F (36.7 °C) (11/19/22 1433)  Pulse: 90 (11/19/22 1433)  Resp: 18 (11/19/22 1433)  BP: 134/82 (11/19/22 1433)  SpO2: 98 % (11/19/22 1433) Vital Signs (24h Range):  Temp:  [96.7 °F (35.9 °C)-98.1 °F (36.7 °C)] 98.1 °F (36.7 °C)  Pulse:  [] 90  Resp:  [16-21] 18  SpO2:  [95 %-100 %] 98 %  BP: (134-169)/() 134/82     Weight: 44.9 kg (98 lb 15.8 oz)  Body mass index is 17.53 kg/m².    Physical Exam      Constitutional: Patient is in  no acute distress. Well-developed and well-nourished.  Head: Atraumatic. Normocephalic.  Eyes: PERRL. EOM intact. Conjunctivae are not pale. No scleral icterus.  ENT: Mucous membranes are moist. Oropharynx is clear and symmetric.    Neck: Supple. Full ROM. No lymphadenopathy.  Cardiovascular: Regular rate. Regular rhythm. No murmurs, rubs, or gallops. Distal pulses are 2+ and symmetric.  Pulmonary/Chest: No respiratory distress. Clear to auscultation bilaterally. No wheezing or rales.  Abdominal: Soft and non-distended.  There is no tenderness.  No rebound, guarding, or rigidity. Good bowel sounds.  Genitourinary: No CVA tenderness  Musculoskeletal: Moves all extremities. No obvious deformities. No edema. No calf tenderness.  Skin: Warm and dry. 1.5 cm laceration to postauricular. Skinned left hand.   Neurological:  Alert, awake, and appropriate.  Normal speech.  No acute focal neurological deficits are appreciated.  Psychiatric: Normal affect. Good eye contact. Appropriate in content.      Significant Labs:     Results for orders placed or performed during the hospital encounter of 11/19/22   CBC auto differential   Result Value Ref Range    WBC 10.13 3.90 - 12.70 K/uL    RBC 4.42 4.00 - 5.40 M/uL    Hemoglobin 12.1 12.0 - 16.0 g/dL    Hematocrit 38.0 37.0 - 48.5 %    MCV 86 82 - 98 fL    MCH 27.4 27.0 - 31.0 pg    MCHC 31.8 (L) 32.0 - 36.0 g/dL    RDW 14.8 (H) 11.5 - 14.5 %    Platelets 235 150 - 450 K/uL    MPV 10.0 9.2 - 12.9 fL    Immature Granulocytes 0.8 (H) 0.0 - 0.5 %    Gran # (ANC) 8.8 (H) 1.8 - 7.7 K/uL    Immature Grans (Abs) 0.08 (H) 0.00 - 0.04 K/uL    Lymph # 0.7 (L) 1.0 - 4.8 K/uL    Mono # 0.5 0.3 - 1.0 K/uL    Eos # 0.1 0.0 - 0.5 K/uL    Baso # 0.04 0.00 - 0.20 K/uL    nRBC 0 0 /100 WBC    Gran % 86.9 (H) 38.0 - 73.0 %    Lymph % 6.9 (L) 18.0 - 48.0 %    Mono % 4.5 4.0 - 15.0 %    Eosinophil % 0.5 0.0 - 8.0 %    Basophil % 0.4 0.0 - 1.9 %    Differential Method Automated    Comprehensive metabolic  panel   Result Value Ref Range    Sodium 138 136 - 145 mmol/L    Potassium 3.6 3.5 - 5.1 mmol/L    Chloride 103 95 - 110 mmol/L    CO2 22 (L) 23 - 29 mmol/L    Glucose 104 70 - 110 mg/dL    BUN 8 (L) 10 - 30 mg/dL    Creatinine 0.7 0.5 - 1.4 mg/dL    Calcium 9.3 8.7 - 10.5 mg/dL    Total Protein 7.8 6.0 - 8.4 g/dL    Albumin 3.6 3.5 - 5.2 g/dL    Total Bilirubin 0.5 0.1 - 1.0 mg/dL    Alkaline Phosphatase 77 55 - 135 U/L    AST 18 10 - 40 U/L    ALT 13 10 - 44 U/L    Anion Gap 13 8 - 16 mmol/L    eGFR >60 >60 mL/min/1.73 m^2   Troponin I #1   Result Value Ref Range    Troponin I 0.154 (H) 0.000 - 0.026 ng/mL   BNP   Result Value Ref Range     (H) 0 - 99 pg/mL        Significant Imaging:     Imaging Results              CT Chest Abdomen Pelvis Without Contrast (XPD) (Final result)  Result time 11/19/22 08:43:53      Final result by Satya Velásquez MD (11/19/22 08:43:53)                   Impression:      1. Multiple thoracolumbar compression fracture deformities including T7, T10, and L2, as detailed above, with age-indeterminate T10 fracture.  There is retropulsion associated with the T10 and L2 fractures with subsequent spinal canal stenosis, see discussion above.  Motion artifact may account for the midsternal step-off, however, correlate for midsternal pain to exclude fracture.  Remote healed fracture deformities left superior and inferior pubic rami.  Status post ORIF bilateral femurs.  The bones appear osteopenic.  2. Right middle lobe and right lower lobe clustered nodular opacities with associated traction bronchiectasis likely post inflammatory).  Biapical plaque-like pleuroparenchymal scarring with traction bronchiectasis.  Tiny dependent right pleural effusion versus pleural thickening.  3. Right ovarian cyst, 5.5 cm.  4. Distended gallbladder with 7 mm rounded density at the fundus, probable gallstone.  All CT scans at this facility are performed  using dose modulation techniques as appropriate to  performed exam including the following:  automated exposure control; adjustment of mA and/or kV according to the patients size (this includes techniques or standardized protocols for targeted exams where dose is matched to indication/reason for exam: i.e. extremities or head);  iterative reconstruction technique.      Electronically signed by: Satya Velásquez MD  Date:    11/19/2022  Time:    08:43               Narrative:    EXAMINATION:  CT CHEST ABDOMEN PELVIS WITHOUT CONTRAST(XPD)    CLINICAL HISTORY:  Polytrauma, blunt;    TECHNIQUE:  Chest abdomen pelvic CT without contrast.  Coronal and sagittal reformations.  Soft tissue and lung algorithms.    COMPARISON:  None    FINDINGS:  Chest CT.  Heart, pericardium, and aorta are unremarkable.  There is no mediastinal hematoma.  No lymphadenopathy.    Trachea and central bronchi are patent.    Multiple bibasilar pleuroparenchymal bands of scarring.  There are few clustered nodular opacities posterior right lower lobe, largest 6 mm, probably post inflammatory post infection sequela.  Likewise, clustered nodular opacities anteromedial right upper lobe, just above the minor fissure, with associated traction bronchiectasis, likely post infectious or post inflammatory sequela including mycobacterial or fungal.  Biapical pleuroparenchymal plaque-like scar with traction bronchiectasis.    Tiny dependent right pleural effusion versus pleural thickening.  There is no pneumothorax.    Chronic T7 compression fracture with 50% loss of height with vertebral body sclerosis.    Age-indeterminate T10 compression fracture, 25% loss of height, with retropulsion inferior aspect posterior vertebral body wall or there is a cortical lucency, possible acute compression fracture.  Correlate for acute mid back pain.  There is subsequent focal narrowing of the thoracic spinal canal (10 mm).    Accentuated concavity superior endplates T11 and T12, probable Schmorl's node versus minor compression  deformities without retropulsion.    Motion artifact may account for the mid sternal step-off, however, correlate for midsternal chest pain to exclude sternal fracture.    No body wall swelling.    Abdominal CT.  Noncontrast evaluation liver, spleen, pancreas, and adrenal glands is unremarkable.  There is a pattern of bilateral mild hydronephrosis without hydroureter.  The urinary bladder is prominently distended.    Normal caliber atherosclerotic aorta.  No lymphadenopathy.  The gallbladder is distended.  There is a 7 mm rounded density at the gallbladder fundus, probable gallstone.  No bile duct dilatation.    No bowel obstruction.  There are a few colonic diverticula.  GI tract is otherwise unremarkable.    Chronic appearing marked L2 compression fracture, vertebral plana configuration centrally, with prominent retropulsion with moderate to severe spinal canal stenosis (6 mm).  Marked L4-5 and L5-S1 facet arthropathy with grade 2 spondylolisthesis with 9 mm anterolisthesis L4 on L5 and associated mild spinal stenosis.    No body wall swelling.    Pelvic CT.  Remote healed fracture deformities left superior and inferior pubic rami.  Status post ORIF bilateral femurs.  No evidence of acute pelvic fracture.    There is a right ovarian cyst,, 5.5 x 4.5 x 3.8 cm.  Normal size uterus and left ovary.    The ureters are unremarkable.  The urinary bladder is distended.  Pelvic bowel loops are unremarkable.  Atherosclerosis.  There is no pelvic free fluid or adenopathy.    No body wall swelling.                                       CT Cervical Spine Without Contrast (Final result)  Result time 11/19/22 08:47:52      Final result by Satya Velásquez MD (11/19/22 08:47:52)                   Impression:      There is no CT evidence of acute posttraumatic injury osseous cervical spine.  Multilevel advanced facet arthropathy with multilevel grade 1 spondylolisthesis.  Scattered cervical spondylosis.    All CT scans at this  facility are performed  using dose modulation techniques as appropriate to performed exam including the following:  automated exposure control; adjustment of mA and/or kV according to the patients size (this includes techniques or standardized protocols for targeted exams where dose is matched to indication/reason for exam: i.e. extremities or head);  iterative reconstruction technique.      Electronically signed by: Satya Velásquez MD  Date:    11/19/2022  Time:    08:47               Narrative:    EXAMINATION:  CT CERVICAL SPINE WITHOUT CONTRAST    CLINICAL HISTORY:  Neck trauma (Age >= 65y);    TECHNIQUE:  CT cervical spine performed with 1.25 mm axial imaging.  Bone and soft tissue algorithms.  Coronal and sagittal reformations.    COMPARISON:  None    FINDINGS:  The skull base is intact.    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  There is no CT evidence of an acute cervical spine fracture.    There is multilevel grade 1 degenerative spondylolisthesis with a couple mm of anterolisthesis C4 on C5, C5 on C6, and C6 on C7.    Scattered multilevel mild cervical spondylosis.    Multilevel advanced facet arthropathy C2-3, C3-4, C4-5, C5-6, and C6-7.    No evidence of a disc herniation.  No spinal canal stenosis.    No paravertebral soft tissue swelling.    Biapical plaque-like pleuroparenchymal scarring with associated calcifications and rupture bronchiectasis.                                       CT Head Without Contrast (Final result)  Result time 11/19/22 07:49:23      Final result by Satya Velásquez MD (11/19/22 07:49:23)                   Impression:      Left parietal scalp contusion with small hematoma.  Negative for skull fracture.  No acute intracranial hemorrhage.  Age-appropriate advanced cerebral atrophy with advanced deep white matter microangiopathy sequela.    All CT scans at this facility are performed  using dose modulation techniques as appropriate to performed  exam including the following:  automated exposure control; adjustment of mA and/or kV according to the patients size (this includes techniques or standardized protocols for targeted exams where dose is matched to indication/reason for exam: i.e. extremities or head);  iterative reconstruction technique.      Electronically signed by: Satya Velásquez MD  Date:    11/19/2022  Time:    07:49               Narrative:    EXAMINATION:  CT HEAD WITHOUT CONTRAST    CLINICAL HISTORY:  Head trauma, minor (Age >= 65y);    TECHNIQUE:  Routine noncontrast head CT.    COMPARISON:  11/24/2021.    FINDINGS:  No change.    There is no acute intracranial hemorrhage or abnormal extra-axial fluid collection.    There is age-appropriate advanced cerebral atrophy with ventricular-sulcal congruence and extensive deep white matter microangiopathy with bilateral symmetric confluent low-attenuation corona radiata and centrum semiovale distribution.  There is no new abnormal increased or decreased density within the brain parenchyma.  Gray-white differentiation preserved.  There is no intracranial mass or mass effect.    The calvarium is intact.  Left parietal scalp swelling with small hematoma with overlying bandage.    The visualized paranasal sinuses and mastoid air cells are well aerated                                       X-Ray Shoulder 2 or More Views Left (Final result)  Result time 11/19/22 07:30:31      Final result by Satya Velásquez MD (11/19/22 07:30:31)                   Impression:      Negative for acute fracture.      Electronically signed by: Satya Velásquez MD  Date:    11/19/2022  Time:    07:30               Narrative:    EXAMINATION:  XR SHOULDER COMPLETE 2 OR MORE VIEWS LEFT    CLINICAL HISTORY:  fall;    FINDINGS:  Comparison study 01/12/2022.  The bones are osteopenic.  Remote well-healed fracture deformity proximal left humerus at the surgical neck.  There is no acute fracture or dislocation.  Mild AC joint arthropathy.

## 2022-11-19 NOTE — ED NOTES
Bed: 09  Expected date:   Expected time:   Means of arrival: Ambulance Service  Comments:  When clean

## 2022-11-19 NOTE — ASSESSMENT & PLAN NOTE
Patient denied any history of arrhythmias, heart failure, coronary artery disease.    As per EMS report noted to have AFib  In ED noted to be in SVTs with intermittent runs of AFib with transition to sinus rhythm;  Blood pressure is stable  EKG, tele monitoring, cardio follow-up

## 2022-11-19 NOTE — ED PROVIDER NOTES
SCRIBE #1 NOTE: ICecilia am scribing for, and in the presence of, Farzad Adler Jr., MD. I have scribed the entire note.       History     Chief Complaint   Patient presents with    Fall     Ground level fall, has lac to posterior head, skin left hand.  Per EMS she was in SVT rhythm upon arrival, spontaneously converted to ST upon IV start.     Review of patient's allergies indicates:   Allergen Reactions    Codeine Nausea And Vomiting and Nausea Only    Meperidine Nausea And Vomiting and Nausea Only         History of Present Illness     HPI    11/19/2022, 6:29 AM  History obtained from the patient      History of Present Illness: Francie Olsen is a 89 y.o. female patient with a PMHx of HTN, HLD, hearing loss, squamous cell carcinoma, and osteoporosis who presents to the Emergency Department for evaluation of fall which occurred at 5 AM. Pt describes getting out of bed right before 5 AM, reaching to get her robe, falling over backwards, and hitting her head on the bathroom tile. Pt lives alone. She denies being on blood thinners. She notes a fall similar to this occurred 1 year PTA, which resulted in 4 fractured vertebrae and an injured shoulder. EMS report pt being in afib between 50 and 180.  Symptoms are constant and moderate in severity. No mitigating or exacerbating factors reported. Associated sxs include neck pain, dizziness, and abd pain. Patient denies any CP, SOB, LOC, pelvic pain, back pain, and all other sxs at this time. No further complaints or concerns at this time.       Arrival mode: EMS      PCP: Marcie Espinal MD        Past Medical History:  Past Medical History:   Diagnosis Date    Acute blood loss anemia 11/24/2021    Chronic low back pain     Chronic mid back pain     Familial tremor     Hearing loss     Hyperlipemia     Hypertension     Osteoarthritis     Osteoporosis, unspecified     SCC (squamous cell carcinoma), hand 2015    hand    Squamous cell carcinoma 9-10yrs ago    left  "ear       Past Surgical History:  Past Surgical History:   Procedure Laterality Date    CATARACT EXTRACTION      FEMUR FRACTURE SURGERY Right     HIP FRACTURE SURGERY Left aprox 2010    implant for nerve pain      YAG OU           Family History:  Family History   Problem Relation Age of Onset    Cancer Mother          54 uterine    Diabetes Son     Blindness Neg Hx     Cataracts Neg Hx     Glaucoma Neg Hx     Hypertension Neg Hx     Macular degeneration Neg Hx     Retinal detachment Neg Hx     Strabismus Neg Hx     Stroke Neg Hx     Thyroid disease Neg Hx     Melanoma Neg Hx        Social History:  Social History     Tobacco Use    Smoking status: Former    Smokeless tobacco: Never   Substance and Sexual Activity    Alcohol use: No     Alcohol/week: 0.0 standard drinks    Drug use: No    Sexual activity: Never     Birth control/protection: Post-menopausal        Review of Systems     Review of Systems   Constitutional:  Negative for fever.   HENT:  Negative for sore throat.    Respiratory:  Negative for shortness of breath.    Cardiovascular:  Negative for chest pain.   Gastrointestinal:  Positive for abdominal pain. Negative for nausea.   Genitourinary:  Negative for dysuria.   Musculoskeletal:  Positive for neck pain. Negative for back pain.        (-) pelvic pain   Skin:  Negative for rash.   Neurological:  Positive for dizziness. Negative for syncope and weakness.   Hematological:  Does not bruise/bleed easily.   All other systems reviewed and are negative.     Physical Exam     Initial Vitals [22 0606]   BP Pulse Resp Temp SpO2   (!) 162/102 106 16 96.7 °F (35.9 °C) 97 %      MAP       --        /82 (BP Location: Right arm, Patient Position: Lying)   Pulse 74   Temp 98.1 °F (36.7 °C) (Oral)   Resp 18   Ht 5' 3" (1.6 m)   Wt 44.9 kg (98 lb 15.8 oz)   SpO2 98%   BMI 17.53 kg/m²     Physical Exam   Nursing Notes and Vital Signs Reviewed.  Constitutional: Patient is in no acute distress. " Well-developed and well-nourished.  Head: Atraumatic. Normocephalic.  Eyes: PERRL. EOM intact. Conjunctivae are not pale. No scleral icterus.  ENT: Mucous membranes are moist. Oropharynx is clear and symmetric.    Neck: Supple. Full ROM. No lymphadenopathy.  Cardiovascular: Regular rate. Regular rhythm. No murmurs, rubs, or gallops. Distal pulses are 2+ and symmetric.  Pulmonary/Chest: No respiratory distress. Clear to auscultation bilaterally. No wheezing or rales.  Abdominal: Soft and non-distended.  There is no tenderness.  No rebound, guarding, or rigidity. Good bowel sounds.  Genitourinary: No CVA tenderness  Musculoskeletal: Moves all extremities. No obvious deformities. No edema. No calf tenderness.  Skin: Warm and dry. 1.5 cm laceration to postauricular. Skinned left hand.   Neurological:  Alert, awake, and appropriate.  Normal speech.  No acute focal neurological deficits are appreciated.  Psychiatric: Normal affect. Good eye contact. Appropriate in content.     ED Course   Lac Repair    Date/Time: 11/19/2022 11:55 AM  Performed by: Farzad Adler Jr., MD  Authorized by: Farzad Adler Jr., MD     Consent:     Consent obtained:  Verbal    Consent given by:  Patient    Risks, benefits, and alternatives were discussed: yes    Universal protocol:     Procedure explained and questions answered to patient or proxy's satisfaction: yes      Relevant documents present and verified: yes      Test results available: yes      Imaging studies available: yes      Required blood products, implants, devices, and special equipment available: yes      Site/side marked: yes      Immediately prior to procedure, a time out was called: yes    Anesthesia:     Anesthesia method:  Local infiltration    Local anesthetic:  Lidocaine 1% WITH epi  Laceration details:     Location:  Scalp    Scalp location:  L temporal    Length (cm):  1.5    Depth (mm):  5  Pre-procedure details:     Preparation:  Patient was prepped and draped in usual  "sterile fashion  Treatment:     Area cleansed with:  Povidone-iodine    Amount of cleaning:  Standard    Irrigation solution:  Sterile saline    Visualized foreign bodies/material removed: no      Debridement:  None    Undermining:  None  Skin repair:     Repair method:  Staples    Number of staples:  2  Repair type:     Repair type:  Simple  ED Vital Signs:  Vitals:    11/19/22 0606 11/19/22 0730 11/19/22 0800 11/19/22 0900   BP: (!) 162/102  (!) 169/84 (!) 146/74   Pulse: 106 (!) 113 (!) 119 96   Resp: 16  18 18   Temp: 96.7 °F (35.9 °C)      TempSrc: Oral      SpO2: 97%  95% 95%   Weight:       Height: 5' 3" (1.6 m)       11/19/22 0947 11/19/22 1000 11/19/22 1100 11/19/22 1130   BP: (!) 146/74 (!) 154/68 (!) 166/72    Pulse: 110 77 64    Resp: 18 18 (!) 21    Temp:       TempSrc:       SpO2: 96% 95% 95%    Weight:    44.9 kg (98 lb 15.8 oz)   Height:        11/19/22 1131 11/19/22 1200 11/19/22 1300 11/19/22 1433   BP:  (!) 141/69 (!) 158/72 134/82   Pulse:  73 68 90   Resp: 19 18 20 18   Temp:    98.1 °F (36.7 °C)   TempSrc:    Oral   SpO2:  96% 100% 98%   Weight:       Height:        11/19/22 1525 11/19/22 1532   BP:     Pulse: 74    Resp:  18   Temp:     TempSrc:     SpO2:     Weight:     Height:         Abnormal Lab Results:  Labs Reviewed   CBC W/ AUTO DIFFERENTIAL - Abnormal; Notable for the following components:       Result Value    MCHC 31.8 (*)     RDW 14.8 (*)     Immature Granulocytes 0.8 (*)     Gran # (ANC) 8.8 (*)     Immature Grans (Abs) 0.08 (*)     Lymph # 0.7 (*)     Gran % 86.9 (*)     Lymph % 6.9 (*)     All other components within normal limits   COMPREHENSIVE METABOLIC PANEL - Abnormal; Notable for the following components:    CO2 22 (*)     BUN 8 (*)     All other components within normal limits   TROPONIN I - Abnormal; Notable for the following components:    Troponin I 0.154 (*)     All other components within normal limits   B-TYPE NATRIURETIC PEPTIDE - Abnormal; Notable for the " following components:     (*)     All other components within normal limits        All Lab Results:  Results for orders placed or performed during the hospital encounter of 11/19/22   CBC auto differential   Result Value Ref Range    WBC 10.13 3.90 - 12.70 K/uL    RBC 4.42 4.00 - 5.40 M/uL    Hemoglobin 12.1 12.0 - 16.0 g/dL    Hematocrit 38.0 37.0 - 48.5 %    MCV 86 82 - 98 fL    MCH 27.4 27.0 - 31.0 pg    MCHC 31.8 (L) 32.0 - 36.0 g/dL    RDW 14.8 (H) 11.5 - 14.5 %    Platelets 235 150 - 450 K/uL    MPV 10.0 9.2 - 12.9 fL    Immature Granulocytes 0.8 (H) 0.0 - 0.5 %    Gran # (ANC) 8.8 (H) 1.8 - 7.7 K/uL    Immature Grans (Abs) 0.08 (H) 0.00 - 0.04 K/uL    Lymph # 0.7 (L) 1.0 - 4.8 K/uL    Mono # 0.5 0.3 - 1.0 K/uL    Eos # 0.1 0.0 - 0.5 K/uL    Baso # 0.04 0.00 - 0.20 K/uL    nRBC 0 0 /100 WBC    Gran % 86.9 (H) 38.0 - 73.0 %    Lymph % 6.9 (L) 18.0 - 48.0 %    Mono % 4.5 4.0 - 15.0 %    Eosinophil % 0.5 0.0 - 8.0 %    Basophil % 0.4 0.0 - 1.9 %    Differential Method Automated    Comprehensive metabolic panel   Result Value Ref Range    Sodium 138 136 - 145 mmol/L    Potassium 3.6 3.5 - 5.1 mmol/L    Chloride 103 95 - 110 mmol/L    CO2 22 (L) 23 - 29 mmol/L    Glucose 104 70 - 110 mg/dL    BUN 8 (L) 10 - 30 mg/dL    Creatinine 0.7 0.5 - 1.4 mg/dL    Calcium 9.3 8.7 - 10.5 mg/dL    Total Protein 7.8 6.0 - 8.4 g/dL    Albumin 3.6 3.5 - 5.2 g/dL    Total Bilirubin 0.5 0.1 - 1.0 mg/dL    Alkaline Phosphatase 77 55 - 135 U/L    AST 18 10 - 40 U/L    ALT 13 10 - 44 U/L    Anion Gap 13 8 - 16 mmol/L    eGFR >60 >60 mL/min/1.73 m^2   Troponin I #1   Result Value Ref Range    Troponin I 0.154 (H) 0.000 - 0.026 ng/mL   BNP   Result Value Ref Range     (H) 0 - 99 pg/mL         Imaging Results:  Imaging Results              CT Chest Abdomen Pelvis Without Contrast (XPD) (Final result)  Result time 11/19/22 08:43:53      Final result by Satya Velásquez MD (11/19/22 08:43:53)                   Impression:       1. Multiple thoracolumbar compression fracture deformities including T7, T10, and L2, as detailed above, with age-indeterminate T10 fracture.  There is retropulsion associated with the T10 and L2 fractures with subsequent spinal canal stenosis, see discussion above.  Motion artifact may account for the midsternal step-off, however, correlate for midsternal pain to exclude fracture.  Remote healed fracture deformities left superior and inferior pubic rami.  Status post ORIF bilateral femurs.  The bones appear osteopenic.  2. Right middle lobe and right lower lobe clustered nodular opacities with associated traction bronchiectasis likely post inflammatory).  Biapical plaque-like pleuroparenchymal scarring with traction bronchiectasis.  Tiny dependent right pleural effusion versus pleural thickening.  3. Right ovarian cyst, 5.5 cm.  4. Distended gallbladder with 7 mm rounded density at the fundus, probable gallstone.  All CT scans at this facility are performed  using dose modulation techniques as appropriate to performed exam including the following:  automated exposure control; adjustment of mA and/or kV according to the patients size (this includes techniques or standardized protocols for targeted exams where dose is matched to indication/reason for exam: i.e. extremities or head);  iterative reconstruction technique.      Electronically signed by: Satya Velásquez MD  Date:    11/19/2022  Time:    08:43               Narrative:    EXAMINATION:  CT CHEST ABDOMEN PELVIS WITHOUT CONTRAST(XPD)    CLINICAL HISTORY:  Polytrauma, blunt;    TECHNIQUE:  Chest abdomen pelvic CT without contrast.  Coronal and sagittal reformations.  Soft tissue and lung algorithms.    COMPARISON:  None    FINDINGS:  Chest CT.  Heart, pericardium, and aorta are unremarkable.  There is no mediastinal hematoma.  No lymphadenopathy.    Trachea and central bronchi are patent.    Multiple bibasilar pleuroparenchymal bands of scarring.  There are few  clustered nodular opacities posterior right lower lobe, largest 6 mm, probably post inflammatory post infection sequela.  Likewise, clustered nodular opacities anteromedial right upper lobe, just above the minor fissure, with associated traction bronchiectasis, likely post infectious or post inflammatory sequela including mycobacterial or fungal.  Biapical pleuroparenchymal plaque-like scar with traction bronchiectasis.    Tiny dependent right pleural effusion versus pleural thickening.  There is no pneumothorax.    Chronic T7 compression fracture with 50% loss of height with vertebral body sclerosis.    Age-indeterminate T10 compression fracture, 25% loss of height, with retropulsion inferior aspect posterior vertebral body wall or there is a cortical lucency, possible acute compression fracture.  Correlate for acute mid back pain.  There is subsequent focal narrowing of the thoracic spinal canal (10 mm).    Accentuated concavity superior endplates T11 and T12, probable Schmorl's node versus minor compression deformities without retropulsion.    Motion artifact may account for the mid sternal step-off, however, correlate for midsternal chest pain to exclude sternal fracture.    No body wall swelling.    Abdominal CT.  Noncontrast evaluation liver, spleen, pancreas, and adrenal glands is unremarkable.  There is a pattern of bilateral mild hydronephrosis without hydroureter.  The urinary bladder is prominently distended.    Normal caliber atherosclerotic aorta.  No lymphadenopathy.  The gallbladder is distended.  There is a 7 mm rounded density at the gallbladder fundus, probable gallstone.  No bile duct dilatation.    No bowel obstruction.  There are a few colonic diverticula.  GI tract is otherwise unremarkable.    Chronic appearing marked L2 compression fracture, vertebral plana configuration centrally, with prominent retropulsion with moderate to severe spinal canal stenosis (6 mm).  Marked L4-5 and L5-S1 facet  arthropathy with grade 2 spondylolisthesis with 9 mm anterolisthesis L4 on L5 and associated mild spinal stenosis.    No body wall swelling.    Pelvic CT.  Remote healed fracture deformities left superior and inferior pubic rami.  Status post ORIF bilateral femurs.  No evidence of acute pelvic fracture.    There is a right ovarian cyst,, 5.5 x 4.5 x 3.8 cm.  Normal size uterus and left ovary.    The ureters are unremarkable.  The urinary bladder is distended.  Pelvic bowel loops are unremarkable.  Atherosclerosis.  There is no pelvic free fluid or adenopathy.    No body wall swelling.                                       CT Cervical Spine Without Contrast (Final result)  Result time 11/19/22 08:47:52      Final result by Satya Velásquez MD (11/19/22 08:47:52)                   Impression:      There is no CT evidence of acute posttraumatic injury osseous cervical spine.  Multilevel advanced facet arthropathy with multilevel grade 1 spondylolisthesis.  Scattered cervical spondylosis.    All CT scans at this facility are performed  using dose modulation techniques as appropriate to performed exam including the following:  automated exposure control; adjustment of mA and/or kV according to the patients size (this includes techniques or standardized protocols for targeted exams where dose is matched to indication/reason for exam: i.e. extremities or head);  iterative reconstruction technique.      Electronically signed by: Satya Velásquez MD  Date:    11/19/2022  Time:    08:47               Narrative:    EXAMINATION:  CT CERVICAL SPINE WITHOUT CONTRAST    CLINICAL HISTORY:  Neck trauma (Age >= 65y);    TECHNIQUE:  CT cervical spine performed with 1.25 mm axial imaging.  Bone and soft tissue algorithms.  Coronal and sagittal reformations.    COMPARISON:  None    FINDINGS:  The skull base is intact.    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  There is no CT evidence of an acute  cervical spine fracture.    There is multilevel grade 1 degenerative spondylolisthesis with a couple mm of anterolisthesis C4 on C5, C5 on C6, and C6 on C7.    Scattered multilevel mild cervical spondylosis.    Multilevel advanced facet arthropathy C2-3, C3-4, C4-5, C5-6, and C6-7.    No evidence of a disc herniation.  No spinal canal stenosis.    No paravertebral soft tissue swelling.    Biapical plaque-like pleuroparenchymal scarring with associated calcifications and rupture bronchiectasis.                                       CT Head Without Contrast (Final result)  Result time 11/19/22 07:49:23      Final result by Satya Velásquez MD (11/19/22 07:49:23)                   Impression:      Left parietal scalp contusion with small hematoma.  Negative for skull fracture.  No acute intracranial hemorrhage.  Age-appropriate advanced cerebral atrophy with advanced deep white matter microangiopathy sequela.    All CT scans at this facility are performed  using dose modulation techniques as appropriate to performed exam including the following:  automated exposure control; adjustment of mA and/or kV according to the patients size (this includes techniques or standardized protocols for targeted exams where dose is matched to indication/reason for exam: i.e. extremities or head);  iterative reconstruction technique.      Electronically signed by: Satya Velásquez MD  Date:    11/19/2022  Time:    07:49               Narrative:    EXAMINATION:  CT HEAD WITHOUT CONTRAST    CLINICAL HISTORY:  Head trauma, minor (Age >= 65y);    TECHNIQUE:  Routine noncontrast head CT.    COMPARISON:  11/24/2021.    FINDINGS:  No change.    There is no acute intracranial hemorrhage or abnormal extra-axial fluid collection.    There is age-appropriate advanced cerebral atrophy with ventricular-sulcal congruence and extensive deep white matter microangiopathy with bilateral symmetric confluent low-attenuation corona radiata and centrum semiovale  distribution.  There is no new abnormal increased or decreased density within the brain parenchyma.  Gray-white differentiation preserved.  There is no intracranial mass or mass effect.    The calvarium is intact.  Left parietal scalp swelling with small hematoma with overlying bandage.    The visualized paranasal sinuses and mastoid air cells are well aerated                                       X-Ray Shoulder 2 or More Views Left (Final result)  Result time 11/19/22 07:30:31      Final result by Satya Velásquez MD (11/19/22 07:30:31)                   Impression:      Negative for acute fracture.      Electronically signed by: Satya Velásquez MD  Date:    11/19/2022  Time:    07:30               Narrative:    EXAMINATION:  XR SHOULDER COMPLETE 2 OR MORE VIEWS LEFT    CLINICAL HISTORY:  fall;    FINDINGS:  Comparison study 01/12/2022.  The bones are osteopenic.  Remote well-healed fracture deformity proximal left humerus at the surgical neck.  There is no acute fracture or dislocation.  Mild AC joint arthropathy.                                       The EKG was ordered, reviewed, and independently interpreted by the ED provider.  Interpretation time: 7:08  Rate: 116 BPM  Rhythm: Accelerated Junctional rhythm.  Interpretation: Anterior infarct, age undetermined. No STEMI.           The Emergency Provider reviewed the vital signs and test results, which are outlined above.     ED Discussion     9:51 AM: Discussed pt's case with Dennise Quiñonez NP ([facility]) who recommends having NSGY look at the compression fractures causing spinal stenosis thoracic and lumbar.     11:32 AM: Discussed case with Dennise Quiñonez NP ([facility]). Dr. Winn agrees with current care and management of pt and accepts admission.   Admitting Service: Hospital Medicine  Admitting Physician: Dr. Winn  Admit to: Observation Telemetry    11:33 AM: Re-evaluated pt. I have discussed test results, shared treatment plan, and the  need for admission with patient and family at bedside. Pt and family express understanding at this time and agree with all information. All questions answered. Pt and family have no further questions or concerns at this time. Pt is ready for admit.          Medical Decision Making:   Initial Assessment:   89-year-old female lives alone got up out of bed 5:00 a.m. this morning to go to the bathroom had near syncope fell backwards hitting the left occipital region of her head has a small laceration and a very large hematoma.  Lacerations repaired.  CT scan of her head neck chest abdomen pelvis is negative for any acute abnormality she does have some chronic old vertebral compression fractures.  She is neurologically intact.  Patient has had episodes of what appears to be SVT or possibly MAT she was given 5 of Lopressor.  Patient needs admission for further workup of near syncope secondary to the tachycardia.  Clinical Tests:   Lab Tests: Ordered and Reviewed  Radiological Study: Ordered and Reviewed  Medical Tests: Ordered and Reviewed         ED Medication(s):  Medications   sodium chloride 0.9% flush 10 mL (has no administration in time range)   naloxone 0.4 mg/mL injection 0.02 mg (has no administration in time range)   glucose chewable tablet 16 g (has no administration in time range)   glucose chewable tablet 24 g (has no administration in time range)   glucagon (human recombinant) injection 1 mg (has no administration in time range)   dextrose 10% bolus 125 mL (has no administration in time range)   dextrose 10% bolus 250 mL (has no administration in time range)   oxyCODONE immediate release tablet 5 mg (5 mg Oral Given 11/19/22 1532)   acetaminophen tablet 500 mg (has no administration in time range)   metoprolol injection 5 mg (5 mg Intravenous Given 11/19/22 0947)   morphine injection 2 mg (2 mg Intravenous Given 11/19/22 1131)   LIDOcaine-EPINEPHrine (PF) 1%-1:200,000 injection 1 mL (1 mL Other Given by  Provider 11/19/22 1200)       Current Discharge Medication List                  Scribe Attestation:   Scribe #1: I performed the above scribed service and the documentation accurately describes the services I performed. I attest to the accuracy of the note.     Attending:   Physician Attestation Statement for Scribe #1: I, Farzad Adler Jr., MD, personally performed the services described in this documentation, as scribed by Cecilia Kaur, in my presence, and it is both accurate and complete.           Clinical Impression       ICD-10-CM ICD-9-CM   1. SVT (supraventricular tachycardia)  I47.1 427.89   2. Chest pain  R07.9 786.50   3. Near syncope  R55 780.2   4. Fall, initial encounter  W19.XXXA E888.9   5. Compression fracture of body of thoracic vertebra  S22.000A 805.2       Disposition:   Disposition: Placed in Observation  Condition: Fair       Farzad Adler Jr., MD  11/19/22 6575

## 2022-11-19 NOTE — H&P
[facility]  History & Physical    Patient Name: Francie Olsen  MRN: [id number]  Patient Class: OP- Observation  Admission Date: 11/19/2022  Attending Physician: Sakshi Winn, *   Primary Care Provider: Marcie Espinal MD         Patient information was obtained from patient and ER records.     Subjective:     Principal Problem:Fall    Chief Complaint:   Chief Complaint   Patient presents with    Fall     Ground level fall, has lac to posterior head, skin left hand.  Per EMS she was in SVT rhythm upon arrival, spontaneously converted to ST upon IV start.        HPI: Francie Olsen is a 89 y.o. female patient with a PMHx of HTN, HLD, hearing loss, squamous cell carcinoma, and osteoporosis who presents to the Emergency Department for evaluation of fall which occurred at 5 AM. Pt describes getting out of bed right before 5 AM, reaching to get her robe, falling over backwards, and hitting her head on the bathroom tile. Pt lives alone/ son and grand son lives next door. She denies being on blood thinners. She notes a fall similar to this occurred 1 year PTA, which resulted in 4 fractured vertebrae and an injured shoulder. EMS report pt being in afib between 50 and 180.  Symptoms are constant and moderate in severity. No mitigating or exacerbating factors reported. Associated sxs include neck pain, dizziness, and abd pain. Patient denies any CP, SOB, LOC, palpitations.     ED physician stated that he initiated transfer process to [place] given anticipated need for neurosurgical intervention.  Stated that he discussed with neurosurgeon from [place], who stated that given patient's age, chronic findings of the CT scan, no need for neurosurgical intervention at this point, provide comfort measures including pain management, bracing.    Stated that she ambulates with walker at home.    Patient alert and oriented x3,- code status DNR/DNI.  Also stated that she does not want  acute interventions given her age.          Past Medical History:   Diagnosis Date    Acute blood loss anemia 11/24/2021    Chronic low back pain     Chronic mid back pain     Familial tremor     Hearing loss     Hyperlipemia     Hypertension     Osteoarthritis     Osteoporosis, unspecified     SCC (squamous cell carcinoma), hand 2015    hand    Squamous cell carcinoma 9-10yrs ago    left ear       Past Surgical History:   Procedure Laterality Date    CATARACT EXTRACTION      FEMUR FRACTURE SURGERY Right     HIP FRACTURE SURGERY Left aprox 2010    implant for nerve pain      YAG OU         Review of patient's allergies indicates:   Allergen Reactions    Codeine Nausea And Vomiting and Nausea Only    Meperidine Nausea And Vomiting and Nausea Only       No current facility-administered medications on file prior to encounter.     Current Outpatient Medications on File Prior to Encounter   Medication Sig    naloxegoL (MOVANTIK) 25 mg tablet Take 1 tablet by mouth once a day as needed for pain (Patient taking differently: Take 25 mg by mouth once daily.)    oxyCODONE (ROXICODONE) 15 MG Tab Take 1 tablet by mouth twice a day as needed for pain    [START ON 11/28/2022] oxyCODONE (ROXICODONE) 15 MG Tab Take 1 tablet by mouth twice a day as needed for pain    [DISCONTINUED] acetaminophen (TYLENOL) 500 MG tablet Take 500 mg by mouth.    [DISCONTINUED] amLODIPine (NORVASC) 5 MG tablet Take 1 tablet (5 mg total) by mouth once daily.    [DISCONTINUED] back brace Misc 1 Device by Misc.(Non-Drug; Combo Route) route daily as needed.    [DISCONTINUED] metoprolol succinate (TOPROL-XL) 50 MG 24 hr tablet TAKE 1 TABLET EVERY DAY    [DISCONTINUED] Multi-Vitamin tablet Take 1 tablet by mouth Daily. 1 Tablet Oral Every day    [DISCONTINUED] NAPHAZOLINE HCL (CLEAR EYES OPHT) Apply to eye.    [DISCONTINUED] nystatin (MYCOSTATIN) 100,000 unit/mL suspension     [DISCONTINUED] ondansetron (ZOFRAN) 4 MG tablet Take 1 tablet (4 mg total) by  mouth every 6 (six) hours as needed for Nausea.    [DISCONTINUED] oxyCODONE (ROXICODONE) 15 MG Tab Take 1 tablet by mouth three times a day as needed for pain    [DISCONTINUED] oxyCODONE-acetaminophen (PERCOCET) 7.5-325 mg per tablet Take 1 tablet by mouth every 6 (six) hours as needed for Pain.    [DISCONTINUED] polyethylene glycol (GLYCOLAX) 17 gram/dose powder Take 17 g by mouth.    [DISCONTINUED] sodium chloride 2% (ABELARDO 128) 2 % ophthalmic solution 1 drop as needed.    [DISCONTINUED] triamcinolone acetonide 0.1% (KENALOG) 0.1 % cream Apply topically 2 (two) times daily.     Family History       Problem Relation (Age of Onset)    Cancer Mother    Diabetes Son          Tobacco Use    Smoking status: Former    Smokeless tobacco: Never   Substance and Sexual Activity    Alcohol use: No     Alcohol/week: 0.0 standard drinks    Drug use: No    Sexual activity: Never     Birth control/protection: Post-menopausal     Review of Systems    Constitutional:  Negative for fever.   HENT:  Negative for sore throat.    Respiratory:  Negative for shortness of breath.    Cardiovascular:  Negative for chest pain.   Gastrointestinal:  Positive for abdominal pain. Negative for nausea.   Genitourinary:  Negative for dysuria.   Musculoskeletal:  Positive for neck pain. Negative for back pain.        (-) pelvic pain   Skin:  Negative for rash.   Neurological:   Negative for syncope and weakness.   Hematological:  Does not bruise/bleed easily.   All other systems reviewed and are negative.    Objective:     Vital Signs (Most Recent):  Temp: 98.1 °F (36.7 °C) (11/19/22 1433)  Pulse: 90 (11/19/22 1433)  Resp: 18 (11/19/22 1433)  BP: 134/82 (11/19/22 1433)  SpO2: 98 % (11/19/22 1433) Vital Signs (24h Range):  Temp:  [96.7 °F (35.9 °C)-98.1 °F (36.7 °C)] 98.1 °F (36.7 °C)  Pulse:  [] 90  Resp:  [16-21] 18  SpO2:  [95 %-100 %] 98 %  BP: (134-169)/() 134/82     Weight: 44.9 kg (98 lb 15.8 oz)  Body mass index is 17.53  kg/m².    Physical Exam      Constitutional: Patient is in no acute distress. Well-developed and well-nourished.  Head: Atraumatic. Normocephalic.  Eyes: PERRL. EOM intact. Conjunctivae are not pale. No scleral icterus.  ENT: Mucous membranes are moist. Oropharynx is clear and symmetric.    Neck: Supple. Full ROM. No lymphadenopathy.  Cardiovascular: Regular rate. Regular rhythm. No murmurs, rubs, or gallops. Distal pulses are 2+ and symmetric.  Pulmonary/Chest: No respiratory distress. Clear to auscultation bilaterally. No wheezing or rales.  Abdominal: Soft and non-distended.  There is no tenderness.  No rebound, guarding, or rigidity. Good bowel sounds.  Genitourinary: No CVA tenderness  Musculoskeletal: Moves all extremities. No obvious deformities. No edema. No calf tenderness.  Skin: Warm and dry. 1.5 cm laceration to postauricular. Skinned left hand.   Neurological:  Alert, awake, and appropriate.  Normal speech.  No acute focal neurological deficits are appreciated.  Psychiatric: Normal affect. Good eye contact. Appropriate in content.      Significant Labs:     Results for orders placed or performed during the hospital encounter of 11/19/22   CBC auto differential   Result Value Ref Range    WBC 10.13 3.90 - 12.70 K/uL    RBC 4.42 4.00 - 5.40 M/uL    Hemoglobin 12.1 12.0 - 16.0 g/dL    Hematocrit 38.0 37.0 - 48.5 %    MCV 86 82 - 98 fL    MCH 27.4 27.0 - 31.0 pg    MCHC 31.8 (L) 32.0 - 36.0 g/dL    RDW 14.8 (H) 11.5 - 14.5 %    Platelets 235 150 - 450 K/uL    MPV 10.0 9.2 - 12.9 fL    Immature Granulocytes 0.8 (H) 0.0 - 0.5 %    Gran # (ANC) 8.8 (H) 1.8 - 7.7 K/uL    Immature Grans (Abs) 0.08 (H) 0.00 - 0.04 K/uL    Lymph # 0.7 (L) 1.0 - 4.8 K/uL    Mono # 0.5 0.3 - 1.0 K/uL    Eos # 0.1 0.0 - 0.5 K/uL    Baso # 0.04 0.00 - 0.20 K/uL    nRBC 0 0 /100 WBC    Gran % 86.9 (H) 38.0 - 73.0 %    Lymph % 6.9 (L) 18.0 - 48.0 %    Mono % 4.5 4.0 - 15.0 %    Eosinophil % 0.5 0.0 - 8.0 %    Basophil % 0.4 0.0 - 1.9 %     Differential Method Automated    Comprehensive metabolic panel   Result Value Ref Range    Sodium 138 136 - 145 mmol/L    Potassium 3.6 3.5 - 5.1 mmol/L    Chloride 103 95 - 110 mmol/L    CO2 22 (L) 23 - 29 mmol/L    Glucose 104 70 - 110 mg/dL    BUN 8 (L) 10 - 30 mg/dL    Creatinine 0.7 0.5 - 1.4 mg/dL    Calcium 9.3 8.7 - 10.5 mg/dL    Total Protein 7.8 6.0 - 8.4 g/dL    Albumin 3.6 3.5 - 5.2 g/dL    Total Bilirubin 0.5 0.1 - 1.0 mg/dL    Alkaline Phosphatase 77 55 - 135 U/L    AST 18 10 - 40 U/L    ALT 13 10 - 44 U/L    Anion Gap 13 8 - 16 mmol/L    eGFR >60 >60 mL/min/1.73 m^2   Troponin I #1   Result Value Ref Range    Troponin I 0.154 (H) 0.000 - 0.026 ng/mL   BNP   Result Value Ref Range     (H) 0 - 99 pg/mL        Significant Imaging:     Imaging Results              CT Chest Abdomen Pelvis Without Contrast (XPD) (Final result)  Result time 11/19/22 08:43:53      Final result by Satya Velásquez MD (11/19/22 08:43:53)                   Impression:      1. Multiple thoracolumbar compression fracture deformities including T7, T10, and L2, as detailed above, with age-indeterminate T10 fracture.  There is retropulsion associated with the T10 and L2 fractures with subsequent spinal canal stenosis, see discussion above.  Motion artifact may account for the midsternal step-off, however, correlate for midsternal pain to exclude fracture.  Remote healed fracture deformities left superior and inferior pubic rami.  Status post ORIF bilateral femurs.  The bones appear osteopenic.  2. Right middle lobe and right lower lobe clustered nodular opacities with associated traction bronchiectasis likely post inflammatory).  Biapical plaque-like pleuroparenchymal scarring with traction bronchiectasis.  Tiny dependent right pleural effusion versus pleural thickening.  3. Right ovarian cyst, 5.5 cm.  4. Distended gallbladder with 7 mm rounded density at the fundus, probable gallstone.  All CT scans at this facility are  performed  using dose modulation techniques as appropriate to performed exam including the following:  automated exposure control; adjustment of mA and/or kV according to the patients size (this includes techniques or standardized protocols for targeted exams where dose is matched to indication/reason for exam: i.e. extremities or head);  iterative reconstruction technique.      Electronically signed by: Satya Velásquez MD  Date:    11/19/2022  Time:    08:43               Narrative:    EXAMINATION:  CT CHEST ABDOMEN PELVIS WITHOUT CONTRAST(XPD)    CLINICAL HISTORY:  Polytrauma, blunt;    TECHNIQUE:  Chest abdomen pelvic CT without contrast.  Coronal and sagittal reformations.  Soft tissue and lung algorithms.    COMPARISON:  None    FINDINGS:  Chest CT.  Heart, pericardium, and aorta are unremarkable.  There is no mediastinal hematoma.  No lymphadenopathy.    Trachea and central bronchi are patent.    Multiple bibasilar pleuroparenchymal bands of scarring.  There are few clustered nodular opacities posterior right lower lobe, largest 6 mm, probably post inflammatory post infection sequela.  Likewise, clustered nodular opacities anteromedial right upper lobe, just above the minor fissure, with associated traction bronchiectasis, likely post infectious or post inflammatory sequela including mycobacterial or fungal.  Biapical pleuroparenchymal plaque-like scar with traction bronchiectasis.    Tiny dependent right pleural effusion versus pleural thickening.  There is no pneumothorax.    Chronic T7 compression fracture with 50% loss of height with vertebral body sclerosis.    Age-indeterminate T10 compression fracture, 25% loss of height, with retropulsion inferior aspect posterior vertebral body wall or there is a cortical lucency, possible acute compression fracture.  Correlate for acute mid back pain.  There is subsequent focal narrowing of the thoracic spinal canal (10 mm).    Accentuated concavity superior endplates  T11 and T12, probable Schmorl's node versus minor compression deformities without retropulsion.    Motion artifact may account for the mid sternal step-off, however, correlate for midsternal chest pain to exclude sternal fracture.    No body wall swelling.    Abdominal CT.  Noncontrast evaluation liver, spleen, pancreas, and adrenal glands is unremarkable.  There is a pattern of bilateral mild hydronephrosis without hydroureter.  The urinary bladder is prominently distended.    Normal caliber atherosclerotic aorta.  No lymphadenopathy.  The gallbladder is distended.  There is a 7 mm rounded density at the gallbladder fundus, probable gallstone.  No bile duct dilatation.    No bowel obstruction.  There are a few colonic diverticula.  GI tract is otherwise unremarkable.    Chronic appearing marked L2 compression fracture, vertebral plana configuration centrally, with prominent retropulsion with moderate to severe spinal canal stenosis (6 mm).  Marked L4-5 and L5-S1 facet arthropathy with grade 2 spondylolisthesis with 9 mm anterolisthesis L4 on L5 and associated mild spinal stenosis.    No body wall swelling.    Pelvic CT.  Remote healed fracture deformities left superior and inferior pubic rami.  Status post ORIF bilateral femurs.  No evidence of acute pelvic fracture.    There is a right ovarian cyst,, 5.5 x 4.5 x 3.8 cm.  Normal size uterus and left ovary.    The ureters are unremarkable.  The urinary bladder is distended.  Pelvic bowel loops are unremarkable.  Atherosclerosis.  There is no pelvic free fluid or adenopathy.    No body wall swelling.                                       CT Cervical Spine Without Contrast (Final result)  Result time 11/19/22 08:47:52      Final result by Satya Velásquez MD (11/19/22 08:47:52)                   Impression:      There is no CT evidence of acute posttraumatic injury osseous cervical spine.  Multilevel advanced facet arthropathy with multilevel grade 1 spondylolisthesis.   Scattered cervical spondylosis.    All CT scans at this facility are performed  using dose modulation techniques as appropriate to performed exam including the following:  automated exposure control; adjustment of mA and/or kV according to the patients size (this includes techniques or standardized protocols for targeted exams where dose is matched to indication/reason for exam: i.e. extremities or head);  iterative reconstruction technique.      Electronically signed by: Satya Velásquez MD  Date:    11/19/2022  Time:    08:47               Narrative:    EXAMINATION:  CT CERVICAL SPINE WITHOUT CONTRAST    CLINICAL HISTORY:  Neck trauma (Age >= 65y);    TECHNIQUE:  CT cervical spine performed with 1.25 mm axial imaging.  Bone and soft tissue algorithms.  Coronal and sagittal reformations.    COMPARISON:  None    FINDINGS:  The skull base is intact.    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  There is no CT evidence of an acute cervical spine fracture.    There is multilevel grade 1 degenerative spondylolisthesis with a couple mm of anterolisthesis C4 on C5, C5 on C6, and C6 on C7.    Scattered multilevel mild cervical spondylosis.    Multilevel advanced facet arthropathy C2-3, C3-4, C4-5, C5-6, and C6-7.    No evidence of a disc herniation.  No spinal canal stenosis.    No paravertebral soft tissue swelling.    Biapical plaque-like pleuroparenchymal scarring with associated calcifications and rupture bronchiectasis.                                       CT Head Without Contrast (Final result)  Result time 11/19/22 07:49:23      Final result by Satya Velásquez MD (11/19/22 07:49:23)                   Impression:      Left parietal scalp contusion with small hematoma.  Negative for skull fracture.  No acute intracranial hemorrhage.  Age-appropriate advanced cerebral atrophy with advanced deep white matter microangiopathy sequela.    All CT scans at this facility are performed   using dose modulation techniques as appropriate to performed exam including the following:  automated exposure control; adjustment of mA and/or kV according to the patients size (this includes techniques or standardized protocols for targeted exams where dose is matched to indication/reason for exam: i.e. extremities or head);  iterative reconstruction technique.      Electronically signed by: Satya Velásquez MD  Date:    11/19/2022  Time:    07:49               Narrative:    EXAMINATION:  CT HEAD WITHOUT CONTRAST    CLINICAL HISTORY:  Head trauma, minor (Age >= 65y);    TECHNIQUE:  Routine noncontrast head CT.    COMPARISON:  11/24/2021.    FINDINGS:  No change.    There is no acute intracranial hemorrhage or abnormal extra-axial fluid collection.    There is age-appropriate advanced cerebral atrophy with ventricular-sulcal congruence and extensive deep white matter microangiopathy with bilateral symmetric confluent low-attenuation corona radiata and centrum semiovale distribution.  There is no new abnormal increased or decreased density within the brain parenchyma.  Gray-white differentiation preserved.  There is no intracranial mass or mass effect.    The calvarium is intact.  Left parietal scalp swelling with small hematoma with overlying bandage.    The visualized paranasal sinuses and mastoid air cells are well aerated                                       X-Ray Shoulder 2 or More Views Left (Final result)  Result time 11/19/22 07:30:31      Final result by Satya Velásquez MD (11/19/22 07:30:31)                   Impression:      Negative for acute fracture.      Electronically signed by: Satya Velásquez MD  Date:    11/19/2022  Time:    07:30               Narrative:    EXAMINATION:  XR SHOULDER COMPLETE 2 OR MORE VIEWS LEFT    CLINICAL HISTORY:  fall;    FINDINGS:  Comparison study 01/12/2022.  The bones are osteopenic.  Remote well-healed fracture deformity proximal left humerus at the surgical neck.  There is no  acute fracture or dislocation.  Mild AC joint arthropathy.                                       Assessment/Plan:     * Fall  Status post mechanical fall   Patient denied palpitations, dizziness/loss of consciousness prior to the episode;  On arrival- left occipital region of head has a small laceration; Lacerations addressed in ED;  CT head:    Left parietal scalp contusion with small hematoma.  Negative for skull fracture.  No acute intracranial hemorrhage.  CT cervical spine: no CT evidence of acute posttraumatic injury osseous cervical spine.  CT C/ A/P: Multiple thoracolumbar compression fracture deformities including T7, T10, and L2, as detailed above, with age-indeterminate T10 fracture.  There is retropulsion associated with the T10 and L2 fractures with subsequent spinal canal stenosis;  ED physician stated that he initiated transfer process to [place] given anticipated need for neurosurgical intervention.  Stated that he discussed with neurosurgeon from [place], who stated that given patient's age, chronic findings of the CT scan, no need for neurosurgical intervention at this point, provide comfort measures including pain management, bracing.  Neurovascularly intact on physical examination  Pain management, arrangements for bracing, follow up with Neurosurgery at [facility];  PT/OT   CPK                   Compression fracture of body of thoracic vertebra  Nonsurgical management  Pain management, bracing  PT OT      SVT (supraventricular tachycardia)  Patient denied any history of arrhythmias, heart failure, coronary artery disease.    As per EMS report noted to have AFib  In ED noted to be in SVTs with intermittent runs of AFib with transition to sinus rhythm;  Blood pressure is stable  EKG, tele monitoring, cardio follow-up        VTE Risk Mitigation (From admission, onward)           Ordered     IP VTE HIGH RISK PATIENT  Once         11/19/22 1443     Place sequential compression device  Until  discontinued         11/19/22 1443                      Addendum: placed under observation;    Sakshi Winn MD  Department of Hospital Medicine   '[place] - Telemetry ([facility])

## 2022-11-19 NOTE — ASSESSMENT & PLAN NOTE
Status post mechanical fall   Patient denied palpitations, dizziness/loss of consciousness prior to the episode;  On arrival- left occipital region of head has a small laceration; Lacerations addressed in ED;  CT head:    Left parietal scalp contusion with small hematoma.  Negative for skull fracture.  No acute intracranial hemorrhage.  CT cervical spine: no CT evidence of acute posttraumatic injury osseous cervical spine.  CT C/ A/P: Multiple thoracolumbar compression fracture deformities including T7, T10, and L2, as detailed above, with age-indeterminate T10 fracture.  There is retropulsion associated with the T10 and L2 fractures with subsequent spinal canal stenosis;  ED physician stated that he initiated transfer process to [place] given anticipated need for neurosurgical intervention.  Stated that he discussed with neurosurgeon from [place], who stated that given patient's age, chronic findings of the CT scan, no need for neurosurgical intervention at this point, provide comfort measures including pain management, bracing.  Neurovascularly intact on physical examination  Pain management, arrangements for bracing, follow up with Neurosurgery at [facility];  PT/OT   CPK

## 2022-11-19 NOTE — PHARMACY MED REC
"Admission Medication History     The home medication history was taken by Gokul Florence.    You may go to "Admission" then "Reconcile Home Medications" tabs to review and/or act upon these items.     The home medication list has been updated by the Pharmacy department.   Please read ALL comments highlighted in yellow.   Please address this information as you see fit.    Feel free to contact us if you have any questions or require assistance.      The medications listed below were removed from the home medication list. Please reorder if appropriate:  Patient reports no longer taking the following medication(s):  ACETAMINOPHEN 500 MG TABLET  AMLODIPINE 5 MG TABLET  METOPROLOL SUCCINATE 50 MG 24 HR TABLET  MULTIVITAMIN TABLET  NAPHAZOLINE HCL OPHT  NYSTATIN 1000,000 UNIT/mL SUSPENSION  ONDANSETRON 4 MG TABLET  OXYCODONE-ACETAMINOPHEN 7.5-325 MG PER TABLET  POLYETHYLENE GLYCOL 17 GRAM/DOSE POWDER  TRIAMCINOLONE ACETONIDE 0.1 % TOPICAL CREAM    Medications listed below were obtained from: Patient/family, Analytic software- [employer], and Patient's pharmacy  (Not in a hospital admission)      Potential issues to be addressed PRIOR TO DISCHARGE: NONE    Gokul Florence CPhT-Adv  Pharmacy Technician Specialist-Medication History  [facility] [phone]  Secure chat preferred     Current Outpatient Medications on File Prior to Encounter   Medication Sig Dispense Refill Last Dose    naloxegoL (MOVANTIK) 25 mg tablet Take 1 tablet by mouth once a day as needed for pain (Patient taking differently: Take 25 mg by mouth once daily.) 30 tablet 3 11/18/2022    oxyCODONE (ROXICODONE) 15 MG Tab Take 1 tablet by mouth twice a day as needed for pain 60 tablet 0 11/18/2022    [START ON 11/28/2022] oxyCODONE (ROXICODONE) 15 MG Tab Take 1 tablet by mouth twice a day as needed for pain 60 tablet 0 Unknown at on hold for future use    [DISCONTINUED] acetaminophen (TYLENOL) 500 MG tablet Take 500 mg by mouth.       [DISCONTINUED] amLODIPine " (NORVASC) 5 MG tablet Take 1 tablet (5 mg total) by mouth once daily. 30 tablet 11     [DISCONTINUED] back brace Misc 1 Device by Misc.(Non-Drug; Combo Route) route daily as needed. 1 each 0     [DISCONTINUED] metoprolol succinate (TOPROL-XL) 50 MG 24 hr tablet TAKE 1 TABLET EVERY DAY 90 tablet 3     [DISCONTINUED] Multi-Vitamin tablet Take 1 tablet by mouth Daily. 1 Tablet Oral Every day       [DISCONTINUED] NAPHAZOLINE HCL (CLEAR EYES OPHT) Apply to eye.       [DISCONTINUED] nystatin (MYCOSTATIN) 100,000 unit/mL suspension        [DISCONTINUED] ondansetron (ZOFRAN) 4 MG tablet Take 1 tablet (4 mg total) by mouth every 6 (six) hours as needed for Nausea. 12 tablet 0     [DISCONTINUED] oxyCODONE (ROXICODONE) 15 MG Tab Take 1 tablet by mouth three times a day as needed for pain 90 tablet 0     [DISCONTINUED] oxyCODONE-acetaminophen (PERCOCET) 7.5-325 mg per tablet Take 1 tablet by mouth every 6 (six) hours as needed for Pain. 14 tablet 0     [DISCONTINUED] polyethylene glycol (GLYCOLAX) 17 gram/dose powder Take 17 g by mouth.       [DISCONTINUED] sodium chloride 2% (ABELARDO 128) 2 % ophthalmic solution 1 drop as needed.       [DISCONTINUED] triamcinolone acetonide 0.1% (KENALOG) 0.1 % cream Apply topically 2 (two) times daily. 45 g 1                            .

## 2022-11-19 NOTE — HPI
Francie Olsen is a 89 y.o. female patient with a PMHx of HTN, HLD, hearing loss, squamous cell carcinoma, and osteoporosis who presents to the Emergency Department for evaluation of fall which occurred at 5 AM. Pt describes getting out of bed right before 5 AM, reaching to get her robe, falling over backwards, and hitting her head on the bathroom tile. Pt lives alone/ son and grand son lives next door. She denies being on blood thinners. She notes a fall similar to this occurred 1 year PTA, which resulted in 4 fractured vertebrae and an injured shoulder. EMS report pt being in afib between 50 and 180.  Symptoms are constant and moderate in severity. No mitigating or exacerbating factors reported. Associated sxs include neck pain, dizziness, and abd pain. Patient denies any CP, SOB, LOC, palpitations.     ED physician stated that he initiated transfer process to [place] given anticipated need for neurosurgical intervention.  Stated that he discussed with neurosurgeon from [facility], who stated that given patient's age, chronic findings of the CT scan, no need for neurosurgical intervention at this point, provide comfort measures including pain management, bracing.    Stated that she ambulates with walker at home.    Patient alert and oriented x3,- code status DNR/DNI.  Also stated that she does not want acute interventions given her age.

## 2022-11-19 NOTE — NURSING
Patient unable to find eyeglasses and personal remote for implant upon arrival to Telemetry unit. Notified ED; spoke with Osvaldo @0769; said will check and let primary nurse know.

## 2022-11-19 NOTE — PLAN OF CARE
Patient updated on plan of care. Admit from ED; report received from Brigitte SUN. Patient arrived to unit with staples to posterior head; CDI. Patient c/o of severe left back pain from fall; medication given, heat applied, and pillow support provided. Patient has bilateral bruises and abrasion r/t fall. Nonblanchable redness noted on sacral spine; foam mepilex applied,turns 2qhrs provided. Patient titrated to RA SpO2 >95%. Side rails upx2, bed alarm on, bed low, wheels locked, and call light in reach.  Education provided. NSR with PAC's on tele box #[id number]

## 2022-11-20 LAB
ANION GAP SERPL CALC-SCNC: 14 MMOL/L (ref 8–16)
BASOPHILS # BLD AUTO: 0.02 K/UL (ref 0–0.2)
BASOPHILS NFR BLD: 0.2 % (ref 0–1.9)
BUN SERPL-MCNC: 16 MG/DL (ref 10–30)
CALCIUM SERPL-MCNC: 9.2 MG/DL (ref 8.7–10.5)
CHLORIDE SERPL-SCNC: 102 MMOL/L (ref 95–110)
CK SERPL-CCNC: 80 U/L (ref 20–180)
CO2 SERPL-SCNC: 20 MMOL/L (ref 23–29)
CREAT SERPL-MCNC: 0.7 MG/DL (ref 0.5–1.4)
DIFFERENTIAL METHOD: ABNORMAL
EOSINOPHIL # BLD AUTO: 0 K/UL (ref 0–0.5)
EOSINOPHIL NFR BLD: 0.1 % (ref 0–8)
ERYTHROCYTE [DISTWIDTH] IN BLOOD BY AUTOMATED COUNT: 15.1 % (ref 11.5–14.5)
EST. GFR  (NO RACE VARIABLE): >60 ML/MIN/1.73 M^2
GLUCOSE SERPL-MCNC: 115 MG/DL (ref 70–110)
HCT VFR BLD AUTO: 36.2 % (ref 37–48.5)
HGB BLD-MCNC: 11.3 G/DL (ref 12–16)
IMM GRANULOCYTES # BLD AUTO: 0.03 K/UL (ref 0–0.04)
IMM GRANULOCYTES NFR BLD AUTO: 0.4 % (ref 0–0.5)
LYMPHOCYTES # BLD AUTO: 0.8 K/UL (ref 1–4.8)
LYMPHOCYTES NFR BLD: 9.1 % (ref 18–48)
MCH RBC QN AUTO: 27.2 PG (ref 27–31)
MCHC RBC AUTO-ENTMCNC: 31.2 G/DL (ref 32–36)
MCV RBC AUTO: 87 FL (ref 82–98)
MONOCYTES # BLD AUTO: 0.5 K/UL (ref 0.3–1)
MONOCYTES NFR BLD: 6.3 % (ref 4–15)
NEUTROPHILS # BLD AUTO: 7 K/UL (ref 1.8–7.7)
NEUTROPHILS NFR BLD: 83.9 % (ref 38–73)
NRBC BLD-RTO: 0 /100 WBC
PLATELET # BLD AUTO: 239 K/UL (ref 150–450)
PMV BLD AUTO: 10.6 FL (ref 9.2–12.9)
POTASSIUM SERPL-SCNC: 3.5 MMOL/L (ref 3.5–5.1)
RBC # BLD AUTO: 4.15 M/UL (ref 4–5.4)
SODIUM SERPL-SCNC: 136 MMOL/L (ref 136–145)
TROPONIN I SERPL DL<=0.01 NG/ML-MCNC: 0.07 NG/ML (ref 0–0.03)
WBC # BLD AUTO: 8.37 K/UL (ref 3.9–12.7)

## 2022-11-20 PROCEDURE — 36415 COLL VENOUS BLD VENIPUNCTURE: CPT | Performed by: STUDENT IN AN ORGANIZED HEALTH CARE EDUCATION/TRAINING PROGRAM

## 2022-11-20 PROCEDURE — 63600175 PHARM REV CODE 636 W HCPCS: Performed by: STUDENT IN AN ORGANIZED HEALTH CARE EDUCATION/TRAINING PROGRAM

## 2022-11-20 PROCEDURE — 97162 PT EVAL MOD COMPLEX 30 MIN: CPT

## 2022-11-20 PROCEDURE — 97166 OT EVAL MOD COMPLEX 45 MIN: CPT

## 2022-11-20 PROCEDURE — 82550 ASSAY OF CK (CPK): CPT | Performed by: STUDENT IN AN ORGANIZED HEALTH CARE EDUCATION/TRAINING PROGRAM

## 2022-11-20 PROCEDURE — 36415 COLL VENOUS BLD VENIPUNCTURE: CPT | Performed by: NURSE PRACTITIONER

## 2022-11-20 PROCEDURE — G0378 HOSPITAL OBSERVATION PER HR: HCPCS

## 2022-11-20 PROCEDURE — 93010 ELECTROCARDIOGRAM REPORT: CPT | Mod: ,,, | Performed by: INTERNAL MEDICINE

## 2022-11-20 PROCEDURE — 99220 PR INITIAL OBSERVATION CARE,LEVL III: CPT | Mod: 25,,, | Performed by: INTERNAL MEDICINE

## 2022-11-20 PROCEDURE — 80048 BASIC METABOLIC PNL TOTAL CA: CPT | Performed by: NURSE PRACTITIONER

## 2022-11-20 PROCEDURE — 84484 ASSAY OF TROPONIN QUANT: CPT | Performed by: NURSE PRACTITIONER

## 2022-11-20 PROCEDURE — 85025 COMPLETE CBC W/AUTO DIFF WBC: CPT | Performed by: NURSE PRACTITIONER

## 2022-11-20 PROCEDURE — 93010 EKG 12-LEAD: ICD-10-PCS | Mod: ,,, | Performed by: INTERNAL MEDICINE

## 2022-11-20 PROCEDURE — 97116 GAIT TRAINING THERAPY: CPT

## 2022-11-20 PROCEDURE — 93005 ELECTROCARDIOGRAM TRACING: CPT

## 2022-11-20 PROCEDURE — 96376 TX/PRO/DX INJ SAME DRUG ADON: CPT

## 2022-11-20 PROCEDURE — 97530 THERAPEUTIC ACTIVITIES: CPT

## 2022-11-20 PROCEDURE — 25000003 PHARM REV CODE 250: Performed by: INTERNAL MEDICINE

## 2022-11-20 PROCEDURE — 99220 PR INITIAL OBSERVATION CARE,LEVL III: ICD-10-PCS | Mod: 25,,, | Performed by: INTERNAL MEDICINE

## 2022-11-20 RX ORDER — METOPROLOL TARTRATE 25 MG/1
12.5 TABLET ORAL 2 TIMES DAILY
Status: DISCONTINUED | OUTPATIENT
Start: 2022-11-20 | End: 2022-11-21

## 2022-11-20 RX ADMIN — METOPROLOL TARTRATE 12.5 MG: 25 TABLET, FILM COATED ORAL at 09:11

## 2022-11-20 RX ADMIN — METOPROLOL TARTRATE 12.5 MG: 25 TABLET, FILM COATED ORAL at 08:11

## 2022-11-20 RX ADMIN — MORPHINE SULFATE 1 MG: 2 INJECTION, SOLUTION INTRAMUSCULAR; INTRAVENOUS at 06:11

## 2022-11-20 RX ADMIN — MORPHINE SULFATE 1 MG: 2 INJECTION, SOLUTION INTRAMUSCULAR; INTRAVENOUS at 11:11

## 2022-11-20 NOTE — PROGRESS NOTES
[facility] Emergency Medicine  Progress Note    Patient Name: Francie Olsen  MRN: [id number]  Patient Class: OP- Observation   Admission Date: 11/19/2022  Length of Stay: 0 days  Attending Physician: Omar Robledo, *  Primary Care Provider: Marcie Espinal MD        Subjective:     Principal Problem:Fall        HPI:  Francie Olsen is a 89 y.o. female patient with a PMHx of HTN, HLD, hearing loss, squamous cell carcinoma, and osteoporosis who presents to the Emergency Department for evaluation of fall which occurred at 5 AM. Pt describes getting out of bed right before 5 AM, reaching to get her robe, falling over backwards, and hitting her head on the bathroom tile. Pt lives alone/ son and grand son lives next door. She denies being on blood thinners. She notes a fall similar to this occurred 1 year PTA, which resulted in 4 fractured vertebrae and an injured shoulder. EMS report pt being in afib between 50 and 180.  Symptoms are constant and moderate in severity. No mitigating or exacerbating factors reported. Associated sxs include neck pain, dizziness, and abd pain. Patient denies any CP, SOB, LOC, palpitations.     ED physician stated that he initiated transfer process to [place] given anticipated need for neurosurgical intervention.  Stated that he discussed with neurosurgeon from [place], who stated that given patient's age, chronic findings of the CT scan, no need for neurosurgical intervention at this point, provide comfort measures including pain management, bracing.    Stated that she ambulates with walker at home.    Patient alert and oriented x3,- code status DNR/DNI.  Also stated that she does not want acute interventions given her age.          Overview/Hospital Course:  Pt admitted to Observation s/p fall in the setting of SVT. Troponin elevated and Cardiology consulted. CT of head showed left parietal scalp contusion with small hematoma.  Negative for skull fracture  and no acute intracranial hemorrhage.  Age-appropriate advanced cerebral atrophy with advanced deep white matter microangiopathy sequela. Xray of shoulder and ribs show no acute fracture. CT of cervical spine showed no CT evidence of acute posttraumatic injury osseous cervical spine.  Multilevel advanced facet arthropathy with multilevel grade 1 spondylolisthesis.  Scattered cervical spondylosis. CT of chest, abdomen, and pelvis showed Multiple thoracolumbar compression fracture deformities including T7, T10, and L2 with age-indeterminate T10 fracture.  There is retropulsion associated with the T10 and L2 fractures with subsequent spinal canal stenosis. Remote healed fracture deformities left superior and inferior pubic rami.  Status post ORIF bilateral femurs.  The bones appear osteopenic. Right middle lobe and right lower lobe clustered nodular opacities with associated traction bronchiectasis likely post inflammatory). HM discussed case with Neurosurgery and no surgical intervention recommended and bracing recommended only in the presence of back pain. Spontaneous resolution of note with beta blocker initiated at low dose. Serial troponin results in progress. PT/OT evaluation in progress.       Interval History: pt in bed upon exam and reports left rib pain. PT/OT evaluation ordered. Troponin elevated due to demand with serial results in progress. NSR on monitor. Cardiology following.     Review of Systems   Constitutional:  Positive for activity change. Negative for appetite change, fatigue and fever.   HENT:  Negative for congestion, postnasal drip, sinus pressure, sore throat and trouble swallowing.    Respiratory:  Negative for cough, shortness of breath and wheezing.    Cardiovascular:  Negative for chest pain, palpitations and leg swelling.   Gastrointestinal:  Positive for abdominal pain. Negative for abdominal distention, nausea and vomiting.   Genitourinary:  Negative for difficulty urinating, dysuria,  flank pain and urgency.   Musculoskeletal:  Positive for neck pain. Negative for arthralgias, back pain and myalgias.   Skin:  Negative for color change and wound.   Neurological:  Positive for dizziness and weakness. Negative for headaches.   Psychiatric/Behavioral:  Negative for agitation, confusion and sleep disturbance. The patient is not nervous/anxious.    Objective:     Vital Signs (Most Recent):  Temp: 98.3 °F (36.8 °C) (11/20/22 1119)  Pulse: 74 (11/20/22 1123)  Resp: 18 (11/20/22 1119)  BP: (!) 149/68 (11/20/22 1119)  SpO2: 96 % (11/20/22 1119)   Vital Signs (24h Range):  Temp:  [97.5 °F (36.4 °C)-98.3 °F (36.8 °C)] 98.3 °F (36.8 °C)  Pulse:  [] 74  Resp:  [17-20] 18  SpO2:  [96 %-100 %] 96 %  BP: (134-162)/(68-82) 149/68     Weight: 44.9 kg (98 lb 15.8 oz)  Body mass index is 17.53 kg/m².    Intake/Output Summary (Last 24 hours) at 11/20/2022 1221  Last data filed at 11/20/2022 0900  Gross per 24 hour   Intake 358 ml   Output 100 ml   Net 258 ml      Physical Exam  HENT:      Right Ear: Decreased hearing noted.      Left Ear: Decreased hearing noted.      Mouth/Throat:      Mouth: Mucous membranes are dry.      Pharynx: Oropharynx is clear.   Cardiovascular:      Rate and Rhythm: Normal rate and regular rhythm.      Pulses: Normal pulses.      Heart sounds: Normal heart sounds.   Pulmonary:      Effort: Pulmonary effort is normal.      Breath sounds: Normal breath sounds.   Abdominal:      General: Bowel sounds are normal. There is no distension.      Palpations: Abdomen is soft.      Tenderness: There is no abdominal tenderness.   Genitourinary:     Comments: Deferred   Musculoskeletal:         General: Normal range of motion.      Cervical back: Normal range of motion.      Comments: Rib tenderness reported on L side    Skin:     General: Skin is warm and dry.   Neurological:      Mental Status: She is alert and oriented to person, place, and time.   Psychiatric:         Mood and Affect: Mood  normal.         Behavior: Behavior normal.       Significant Labs: All pertinent labs within the past 24 hours have been reviewed.  CBC:   Recent Labs   Lab 11/19/22  0759   WBC 10.13   HGB 12.1   HCT 38.0        CMP:   Recent Labs   Lab 11/19/22  0759      K 3.6      CO2 22*      BUN 8*   CREATININE 0.7   CALCIUM 9.3   PROT 7.8   ALBUMIN 3.6   BILITOT 0.5   ALKPHOS 77   AST 18   ALT 13   ANIONGAP 13     Troponin:   Recent Labs   Lab 11/19/22  0759   TROPONINI 0.154*       Significant Imaging: I have reviewed all pertinent imaging results/findings within the past 24 hours.      Assessment/Plan:      * Fall  Status post mechanical fall   Patient denied palpitations, dizziness/loss of consciousness prior to the episode;  On arrival- left occipital region of head has a small laceration; Lacerations addressed in ED;  CT head:    Left parietal scalp contusion with small hematoma.  Negative for skull fracture.  No acute intracranial hemorrhage.  CT cervical spine: no CT evidence of acute posttraumatic injury osseous cervical spine.  CT C/ A/P: Multiple thoracolumbar compression fracture deformities including T7, T10, and L2, as detailed above, with age-indeterminate T10 fracture.  There is retropulsion associated with the T10 and L2 fractures with subsequent spinal canal stenosis;  ED physician stated that he initiated transfer process to [place] given anticipated need for neurosurgical intervention.  Stated that he discussed with neurosurgeon from [place], who stated that given patient's age, chronic findings of the CT scan, no need for neurosurgical intervention at this point, provide comfort measures including pain management, bracing.  Neurovascularly intact on physical examination  Pain management, arrangements for bracing, follow up with Neurosurgery at [facility]- no surgical intervention recommended and bracing needed in the presence of back pain   PT/OT evaluation ordered   CPK  negative   -Neurosurgery consulted with imaging reviewed and recommendations received        Compression fracture of body of thoracic vertebra  Nonsurgical management  Pain management, bracing  PT/OT  -11/20/22-  chronic appearing L2, T12, T11, and T10 compression fractures.  There is some retropulsion at L2 with moderate central canal stenosis per Neurosurgery with brace recommended in the setting of back pain- analgesia as needed       SVT (supraventricular tachycardia)  Patient denied any history of arrhythmias, heart failure, coronary artery disease.    As per EMS report noted to have AFib  In ED noted to be in SVTs with intermittent runs of AFib with transition to sinus rhythm;  Blood pressure is stable  EKG, tele monitoring, cardio follow-up  -11/20/22- NSR on monitor- Cardiology following with low dose BB initiated        VTE Risk Mitigation (From admission, onward)         Ordered     IP VTE HIGH RISK PATIENT  Once         11/19/22 1713     Place sequential compression device  Until discontinued         11/19/22 1713     Place sequential compression device  Until discontinued         11/19/22 1443                Discharge Planning   JULIANA:      Code Status: Full Code   Is the patient medically ready for discharge?:     Reason for patient still in hospital (select all that apply): Patient trending condition, Laboratory test, Treatment, Consult recommendations and PT / OT recommendations                     Fay Darby NP  Department of Hospital Medicine   [facility] - Telemetry ([facility])

## 2022-11-20 NOTE — CONSULTS
[facility] - Telemetry ([facility])  Cardiology  Consult Note    Patient Name: Francie Olsen  MRN: [id number]  Admission Date: 11/19/2022  Hospital Length of Stay: 0 days  Code Status: Full Code   Attending Provider: Omar Robledo, *   Consulting Provider: Joey Kaba MD  Primary Care Physician: Marcie Espinal MD  Principal Problem:Fall    Patient information was obtained from patient and ER records.     Inpatient consult to Cardiology  Consult performed by: Joey Kaba MD  Consult ordered by: Sakshi Winn MD      Subjective:     Chief Complaint:  SVT     HPI:   89 y.o. female patient with a PMHx of HTN, HLD, hearing loss, squamous cell carcinoma, and osteoporosis  admitted for fall and trauma. Consult is for SVT. Tele strips reviewed, transient SVT- 3 seconds 150 bpm   Converted to NSR. EKG normal pt asymptomatic.Patient denies CP, angina or anginal equivalent or palpitations.          Past Medical History:   Diagnosis Date    Acute blood loss anemia 11/24/2021    Chronic low back pain     Chronic mid back pain     Familial tremor     Hearing loss     Hyperlipemia     Hypertension     Osteoarthritis     Osteoporosis, unspecified     SCC (squamous cell carcinoma), hand 2015    hand    Squamous cell carcinoma 9-10yrs ago    left ear       Past Surgical History:   Procedure Laterality Date    CATARACT EXTRACTION      FEMUR FRACTURE SURGERY Right     HIP FRACTURE SURGERY Left aprox 2010    implant for nerve pain      YAG OU         Review of patient's allergies indicates:   Allergen Reactions    Codeine Nausea And Vomiting and Nausea Only    Meperidine Nausea And Vomiting and Nausea Only       No current facility-administered medications on file prior to encounter.     Current Outpatient Medications on File Prior to Encounter   Medication Sig    naloxegoL (MOVANTIK) 25 mg tablet Take 1 tablet by mouth once a day as needed for pain (Patient taking differently: Take 25 mg by mouth once daily.)     oxyCODONE (ROXICODONE) 15 MG Tab Take 1 tablet by mouth twice a day as needed for pain    [START ON 11/28/2022] oxyCODONE (ROXICODONE) 15 MG Tab Take 1 tablet by mouth twice a day as needed for pain     Family History       Problem Relation (Age of Onset)    Cancer Mother    Diabetes Son          Tobacco Use    Smoking status: Former    Smokeless tobacco: Never   Substance and Sexual Activity    Alcohol use: No     Alcohol/week: 0.0 standard drinks    Drug use: No    Sexual activity: Never     Birth control/protection: Post-menopausal     Review of Systems   Constitutional: Negative. Negative for weight gain.   HENT: Negative.     Eyes: Negative.    Cardiovascular: Negative.  Negative for chest pain, leg swelling and palpitations.   Respiratory: Negative.  Negative for shortness of breath.    Endocrine: Negative.    Hematologic/Lymphatic: Negative.    Skin: Negative.    Musculoskeletal:  Negative for muscle weakness.   Gastrointestinal: Negative.    Genitourinary: Negative.    Neurological: Negative.  Negative for dizziness.   Psychiatric/Behavioral: Negative.     Allergic/Immunologic: Negative.    All other systems reviewed and are negative.  Objective:     Vital Signs (Most Recent):  Temp: 97.5 °F (36.4 °C) (11/20/22 0745)  Pulse: 76 (11/20/22 0745)  Resp: 17 (11/20/22 0745)  BP: (Abnormal) 162/72 (11/20/22 0745)  SpO2: 96 % (11/20/22 0745)   Vital Signs (24h Range):  Temp:  [97.5 °F (36.4 °C)-98.1 °F (36.7 °C)] 97.5 °F (36.4 °C)  Pulse:  [] 76  Resp:  [17-21] 17  SpO2:  [95 %-100 %] 96 %  BP: (134-166)/(68-82) 162/72     Weight: 44.9 kg (98 lb 15.8 oz)  Body mass index is 17.53 kg/m².    SpO2: 96 %  O2 Device (Oxygen Therapy): room air      Intake/Output Summary (Last 24 hours) at 11/20/2022 08  Last data filed at 11/19/2022 1700  Gross per 24 hour   Intake 118 ml   Output 100 ml   Net 18 ml       Lines/Drains/Airways       Peripheral Intravenous Line       Name Duration         Peripheral IV - Single  Lumen 11/19/22 1644 22 G Anterior;Left Forearm <1 day                    Physical Exam  Vitals and nursing note reviewed.   Constitutional:       Appearance: She is well-developed.   HENT:      Head: Normocephalic and atraumatic.   Eyes:      Conjunctiva/sclera: Conjunctivae normal.      Pupils: Pupils are equal, round, and reactive to light.   Cardiovascular:      Rate and Rhythm: Normal rate and regular rhythm.      Pulses: Intact distal pulses.      Heart sounds: Normal heart sounds.   Pulmonary:      Effort: Pulmonary effort is normal.      Breath sounds: Normal breath sounds.   Abdominal:      General: Bowel sounds are normal.      Palpations: Abdomen is soft.   Musculoskeletal:         General: Normal range of motion.      Cervical back: Normal range of motion and neck supple.   Skin:     General: Skin is warm and dry.   Neurological:      Mental Status: She is alert and oriented to person, place, and time.       Significant Labs: All pertinent lab results from the last 24 hours have been reviewed.    Significant Imaging: X-Ray: CXR: X-Ray Chest PA and Lateral (CXR): No results found for this visit on 11/19/22.    Assessment and Plan:     SVT (supraventricular tachycardia)  89 y.o. female patient with a PMHx of HTN, HLD, hearing loss, squamous cell carcinoma, and osteoporosis  admitted for fall and trauma. Consult is for SVT. Tele strips reviewed, transient SVT- 3 seconds 150 bpm   Converted to NSR. EKG normal pt asymptomatic.Patient denies CP, angina or anginal equivalent or palpitations.     Add low dose B blockers, should be able to tolerate with BP up [id number]/80s        VTE Risk Mitigation (From admission, onward)           Ordered     IP VTE HIGH RISK PATIENT  Once         11/19/22 1713     Place sequential compression device  Until discontinued         11/19/22 1713     Place sequential compression device  Until discontinued         11/19/22 1443                    Thank you for your consult. I will sign  off. Please contact us if you have any additional questions.    Joey Kaba MD  Cardiology   [facility] Tele[facility] ([facility])

## 2022-11-20 NOTE — PLAN OF CARE
Case discussed with ED physician, Dr. Adler.    89 yo F s/p fall.   Per Dr. Adler has SVT but is not complaining of back pain nor weakness/paresthesias/radiculopathy.    CT A/P with multilevel lumbar spondylosis.  She has chronic appearing L2, T12, T11, and T10 compression fractures.  There is some retropulsion at L2 with moderate central canal stenosis.    Given the fact these appear to be chronic fractures and the patient is not having any back pain, there is no need for neurosurgical intervention at this time.  Bracing is for comfort only so if the patient is not having any back pain, there is no need for a TLSO brace.  If the patient is having any back pain, I would recommend a TLSO brace when up and ambulating for comfort only.    She can follow-up with Dr. Cavanaugh of [facility] as an outpatient.    Please call with any further questions.  Brenda Gramajo MD

## 2022-11-20 NOTE — PT/OT/SLP EVAL
Occupational Therapy   Evaluation    Name: Francie Olsen  MRN: [id number]  Admitting Diagnosis:  Fall  Recent Surgery: * No surgery found *      Recommendations:     Discharge Recommendations: nursing facility, skilled  Discharge Equipment Recommendations:   (TBD)  Barriers to discharge:  None    Assessment:     Francie Olsen is a 89 y.o. female with a medical diagnosis of Fall.  She presents with SELF CARE DEBILITY . Performance deficits affecting function: weakness, impaired endurance, impaired self care skills, impaired functional mobility, gait instability, impaired balance, decreased safety awareness, pain.      Rehab Prognosis: Good; patient would benefit from acute skilled OT services to address these deficits and reach maximum level of function.       Plan:     Patient to be seen 2 x/week to address the above listed problems via therapeutic exercises, therapeutic activities, self-care/home management  Plan of Care Expires: 12/04/22  Plan of Care Reviewed with: patient    Subjective     Chief Complaint: (L) LATERAL BACK PAIN.  Patient/Family Comments/goals: TO DECREASE PAIN.    Occupational Profile:  Living Environment: PATIENT LIVES IN A 1 STORY HOUSE WITH 1 STEP ALONE WITH SON AND GRANDSON NEXT DOOR.   Previous level of function: PATIENT HAS SOMEONE WHO SHOWERS AND SHAMPOOS PATIENT X3/WEEK.  PATIENT ALSO HAS SOMEONE TO CLEAN HER HOME.  DTR-IN-LAW PREPARES MEALS FOR PATIENT.  Roles and Routines: PATIENT STATED SHE CAN DRESS HERSELF VERY SLOWLY.  Equipment Used at Home:  bedside commode, cane, straight, shower chair, wheelchair, walker, rolling  Assistance upon Discharge: FAMILY    Pain/Comfort:  Pain Rating 1: 8/10  Location - Side 1: Left  Location - Orientation 1: lateral  Location 1: back  Pain Addressed 1: Reposition, Distraction, Cessation of Activity    Patients cultural, spiritual, Holiness conflicts given the current situation:      Objective:     Communicated with: NURSE NYE prior to  session.  Patient found right sidelying with telemetry, peripheral IV upon OT entry to room.    General Precautions: Standard,     Orthopedic Precautions:    Braces: TLSO (PER MD, TLSO FOR COMFORT AS NEEDED WHEN PATIENT UP AND AMBULATING.)  Respiratory Status: Room air    Occupational Performance:    Bed Mobility:    PATIENT MIN (A) WITH ALL LEVELS OF BED MOBILITY.    Functional Mobility/Transfers:  Patient completed Sit <> Stand Transfer with minimum assistance  with  rolling walker   Patient completed Bed <> Chair Transfer using Stand Pivot technique with minimum assistance with rolling walker  Functional Mobility: MIN (A) WITH RW.    Activities of Daily Living:  Upper Body Dressing: minimum assistance    Lower Body Dressing: maximal assistance WITH FOOTWEAR MANAGEMENT WITH SOCKS DUE TO (L) LATERAL BACK PAIN.    Cognitive/Visual Perceptual:  PATIENT WEARS CORRECTIVE LENSES.  NO COGNITIVE DEFICITS NOTED.      Physical Exam:  Balance:    -       MIN (A) WITH RW  Upper Extremity Range of Motion:     -       Right Upper Extremity: WFL  -       Left Upper Extremity: WFL    AMPAC 6 Click ADL:  AMPAC Total Score: 16    Treatment & Education:  OT EVAL PERFORMED.  PATIENT EDUCATED RE:  PURPOSE OF OT.  PATIENT PARTICIPATED IN FUNC MOBILITY AND SELF CARE TASKS     Patient left up in chair with all lines intact, call button in reach, chair alarm on, and NURSE present    GOALS:   Multidisciplinary Problems       Occupational Therapy Goals          Problem: Occupational Therapy    Goal Priority Disciplines Outcome Interventions   Occupational Therapy Goal     OT, PT/OT     Description: LTG'S TO BE MET IN 14 DAYS (12/4/22)    1)  PATIENT WILL PERFORM UB DRESSING WITH MOD (I) TO DECREASE (D) ON CAREGIVER.     2)  PATIENT WILL PERFORM LB DRESSING WITH SBA TO DECREASE (D) ON CAREGIVER..    3)  PATIENT WILL PERFORM TOILET T/F WITH SBA TO DECREASE (D) ON CAREGIVER..    4)  PATIENT WILL PERFORM BUE HEP FOR INCREASED FUNC STRENGTH AND  ENDURANCE WITH MIN VC FOR PROPER TECHNIQUE TO INCREASE PATIENT'S SAFETY AND (I) WITH SELF CARE TASKS.                       History:     Past Medical History:   Diagnosis Date    Acute blood loss anemia 11/24/2021    Chronic low back pain     Chronic mid back pain     Familial tremor     Hearing loss     Hyperlipemia     Hypertension     Osteoarthritis     Osteoporosis, unspecified     SCC (squamous cell carcinoma), hand 2015    hand    Squamous cell carcinoma 9-10yrs ago    left ear         Past Surgical History:   Procedure Laterality Date    CATARACT EXTRACTION      FEMUR FRACTURE SURGERY Right     HIP FRACTURE SURGERY Left aprox 2010    implant for nerve pain      YAG OU         Time Tracking:     OT Date of Treatment: 11/20/22  OT Start Time: 1208  OT Stop Time: 1231  OT Total Time (min): 23 min    Billable Minutes:Evaluation 10  Therapeutic Activity 13    11/20/2022

## 2022-11-20 NOTE — ASSESSMENT & PLAN NOTE
Status post mechanical fall   Patient denied palpitations, dizziness/loss of consciousness prior to the episode;  On arrival- left occipital region of head has a small laceration; Lacerations addressed in ED;  CT head:    Left parietal scalp contusion with small hematoma.  Negative for skull fracture.  No acute intracranial hemorrhage.  CT cervical spine: no CT evidence of acute posttraumatic injury osseous cervical spine.  CT C/ A/P: Multiple thoracolumbar compression fracture deformities including T7, T10, and L2, as detailed above, with age-indeterminate T10 fracture.  There is retropulsion associated with the T10 and L2 fractures with subsequent spinal canal stenosis;  ED physician stated that he initiated transfer process to [place] given anticipated need for neurosurgical intervention.  Stated that he discussed with neurosurgeon from [place], who stated that given patient's age, chronic findings of the CT scan, no need for neurosurgical intervention at this point, provide comfort measures including pain management, bracing.  Neurovascularly intact on physical examination  Pain management, arrangements for bracing, follow up with Neurosurgery at [facility]- no surgical intervention recommended and bracing needed in the presence of back pain   PT/OT evaluation ordered   CPK negative   -Neurosurgery consulted with imaging reviewed and recommendations received

## 2022-11-20 NOTE — PLAN OF CARE
OT EVAL PERFORMED.  PATIENT EDUCATED RE:  PURPOSE OF OT.  PATIENT PARTICIPATED IN [facility] MOBILITY AND SELF CARE TASKS.  PATIENT WOULD BENEFIT FROM SNF PLACEMENT AT D/C FOR CONT THERAPY DUE TO PATIENT LIVING HOME ALONE PTA.

## 2022-11-20 NOTE — SUBJECTIVE & OBJECTIVE
Past Medical History:   Diagnosis Date    Acute blood loss anemia 11/24/2021    Chronic low back pain     Chronic mid back pain     Familial tremor     Hearing loss     Hyperlipemia     Hypertension     Osteoarthritis     Osteoporosis, unspecified     SCC (squamous cell carcinoma), hand 2015    hand    Squamous cell carcinoma 9-10yrs ago    left ear       Past Surgical History:   Procedure Laterality Date    CATARACT EXTRACTION      FEMUR FRACTURE SURGERY Right     HIP FRACTURE SURGERY Left aprox 2010    implant for nerve pain      YAG OU         Review of patient's allergies indicates:   Allergen Reactions    Codeine Nausea And Vomiting and Nausea Only    Meperidine Nausea And Vomiting and Nausea Only       No current facility-administered medications on file prior to encounter.     Current Outpatient Medications on File Prior to Encounter   Medication Sig    naloxegoL (MOVANTIK) 25 mg tablet Take 1 tablet by mouth once a day as needed for pain (Patient taking differently: Take 25 mg by mouth once daily.)    oxyCODONE (ROXICODONE) 15 MG Tab Take 1 tablet by mouth twice a day as needed for pain    [START ON 11/28/2022] oxyCODONE (ROXICODONE) 15 MG Tab Take 1 tablet by mouth twice a day as needed for pain     Family History       Problem Relation (Age of Onset)    Cancer Mother    Diabetes Son          Tobacco Use    Smoking status: Former    Smokeless tobacco: Never   Substance and Sexual Activity    Alcohol use: No     Alcohol/week: 0.0 standard drinks    Drug use: No    Sexual activity: Never     Birth control/protection: Post-menopausal     Review of Systems   Constitutional: Negative. Negative for weight gain.   HENT: Negative.     Eyes: Negative.    Cardiovascular: Negative.  Negative for chest pain, leg swelling and palpitations.   Respiratory: Negative.  Negative for shortness of breath.    Endocrine: Negative.    Hematologic/Lymphatic: Negative.    Skin: Negative.    Musculoskeletal:  Negative for muscle  weakness.   Gastrointestinal: Negative.    Genitourinary: Negative.    Neurological: Negative.  Negative for dizziness.   Psychiatric/Behavioral: Negative.     Allergic/Immunologic: Negative.    All other systems reviewed and are negative.  Objective:     Vital Signs (Most Recent):  Temp: 97.5 °F (36.4 °C) (11/20/22 0745)  Pulse: 76 (11/20/22 0745)  Resp: 17 (11/20/22 0745)  BP: (Abnormal) 162/72 (11/20/22 0745)  SpO2: 96 % (11/20/22 0745)   Vital Signs (24h Range):  Temp:  [97.5 °F (36.4 °C)-98.1 °F (36.7 °C)] 97.5 °F (36.4 °C)  Pulse:  [] 76  Resp:  [17-21] 17  SpO2:  [95 %-100 %] 96 %  BP: (134-166)/(68-82) 162/72     Weight: 44.9 kg (98 lb 15.8 oz)  Body mass index is 17.53 kg/m².    SpO2: 96 %  O2 Device (Oxygen Therapy): room air      Intake/Output Summary (Last 24 hours) at 11/20/2022 0851  Last data filed at 11/19/2022 1700  Gross per 24 hour   Intake 118 ml   Output 100 ml   Net 18 ml       Lines/Drains/Airways       Peripheral Intravenous Line       Name Duration         Peripheral IV - Single Lumen 11/19/22 1644 22 G Anterior;Left Forearm <1 day                    Physical Exam  Vitals and nursing note reviewed.   Constitutional:       Appearance: She is well-developed.   HENT:      Head: Normocephalic and atraumatic.   Eyes:      Conjunctiva/sclera: Conjunctivae normal.      Pupils: Pupils are equal, round, and reactive to light.   Cardiovascular:      Rate and Rhythm: Normal rate and regular rhythm.      Pulses: Intact distal pulses.      Heart sounds: Normal heart sounds.   Pulmonary:      Effort: Pulmonary effort is normal.      Breath sounds: Normal breath sounds.   Abdominal:      General: Bowel sounds are normal.      Palpations: Abdomen is soft.   Musculoskeletal:         General: Normal range of motion.      Cervical back: Normal range of motion and neck supple.   Skin:     General: Skin is warm and dry.   Neurological:      Mental Status: She is alert and oriented to person, place, and  time.       Significant Labs: All pertinent lab results from the last 24 hours have been reviewed.    Significant Imaging: X-Ray: CXR: X-Ray Chest PA and Lateral (CXR): No results found for this visit on 11/19/22.

## 2022-11-20 NOTE — ASSESSMENT & PLAN NOTE
Patient denied any history of arrhythmias, heart failure, coronary artery disease.    As per EMS report noted to have AFib  In ED noted to be in SVTs with intermittent runs of AFib with transition to sinus rhythm;  Blood pressure is stable  EKG, tele monitoring, cardio follow-up  -11/20/22- NSR on monitor- Cardiology following with low dose BB initiated

## 2022-11-20 NOTE — HPI
89 y.o. female patient with a PMHx of HTN, HLD, hearing loss, squamous cell carcinoma, and osteoporosis  admitted for fall and trauma. Consult is for SVT. Tele strips reviewed, transient SVT- 3 seconds 150 bpm   Converted to NSR. EKG normal pt asymptomatic.Patient denies CP, angina or anginal equivalent or palpitations.

## 2022-11-20 NOTE — PT/OT/SLP EVAL
Physical Therapy Evaluation    Patient Name:  Francie Olsen   MRN:  [id number]    Recommendations:     Discharge Recommendations:  nursing facility, skilled   Discharge Equipment Recommendations: other (see comments) (TBD at next level of care)   Barriers to discharge: None    Assessment:     Francie Olsen is a 89 y.o. female admitted with a medical diagnosis of Fall.  She presents with the following impairments/functional limitations:  impaired balance, impaired coordination, impaired endurance, impaired fine motor, impaired functional mobility, impaired joint extensibility, impaired muscle length, gait instability, impaired self care skills, decreased safety awareness.    Rehab Prognosis: Good; patient would benefit from acute skilled PT services to address these deficits and reach maximum level of function.    Recent Surgery: * No surgery found *      Plan:     During this hospitalization, patient to be seen 3 x/week to address the identified rehab impairments via gait training, therapeutic activities, therapeutic exercises and progress toward the following goals:    Plan of Care Expires:  12/04/22    Subjective     Chief Complaint: Lower back pain  Patient/Family Comments/goals: Get stronger and return to prior level of function.   Pain/Comfort:  Pain Rating 1: 8/10  Location - Side 1: Left  Location - Orientation 1: lower  Location 1: back  Pain Addressed 1: Reposition, Cessation of Activity  Pain Rating Post-Intervention 1: 7/10    Patients cultural, spiritual, Caodaism conflicts given the current situation: no    Living Environment:  Patient lives alone in a single level home with one step to enter. Patient ambulates with a RW. Patient has an aide 3 days a week to assist with bathing. Patient is able to dress independent. Patient daughter in law supplies meals.  Equipment used at home: bedside commode, shower chair, cane, straight, wheelchair, walker, rolling.  DME owned (not currently used): single point  cane and wheelchair.  Upon discharge, patient will have assistance from family.    Objective:     Communicated with DINO Mathis prior to session.  Patient found supine with telemetry, peripheral IV, bed alarm  upon PT entry to room.    General Precautions: Standard, fall (Hard of hearing)   Orthopedic Precautions: (None)   Braces:  (Back brace ordered for comfort)  Respiratory Status: Room air    Exams:  Cognitive Exam:  Patient is oriented to Person, Place, Time, and Situation  RLE ROM: WFL  RLE Strength: WFL  LLE ROM: WFL  LLE Strength: WFL    Functional Mobility:  Bed Mobility:     Supine to Sit: minimum assistance  Transfers:     Sit to Stand:  minimum assistance with rolling walker  Bed to Chair: minimum assistance with  rolling walker  using  Step Transfer  Gait: 12 feet, Min A with RW      AM-PAC 6 CLICK MOBILITY  Total Score:16       Treatment & Education:    PATIENT FOUND SUPINE IN BED UPON PT ENTRANCE INTO ROOM. PT PROVIDED EDUCATION ON ROLE OF PT AND POC. PATIENT AGREEABLE TO THERAPY SESSION. PATIENT COMPLETED BED MOBILITY WITH MIN A AND EXTENDED TIME TO COMPLETE. ONCE SEATED EOD, PATIENT HAD FAIR + STATIC BALANCE. PATIENT STOOD, MIN A WITH RW AND AMBULATED X12 IN ROOM, MIN A. PATIENT AMBULATED WITH DECREASED STRIDE LENGTH AND STEP TO GAIT PATTERN. PATIENT TRANSFERRED TO BEDSIDE CHAIR, MIN A. PT PROVIDED EDUCATION ON THEREX TO COMPLETE WHILE IN CHAIR: ANKLE PUMPS, LAQ, MARCHING.     Patient left up in chair with all lines intact, call button in reach, chair alarm on, and RN present.    GOALS:     Bed mob: Mod I  Transfers: Mod I with RW  Gait: Mod I with RW x30 feet      History:     Past Medical History:   Diagnosis Date    Acute blood loss anemia 11/24/2021    Chronic low back pain     Chronic mid back pain     Familial tremor     Hearing loss     Hyperlipemia     Hypertension     Osteoarthritis     Osteoporosis, unspecified     SCC (squamous cell carcinoma), hand 2015    hand    Squamous cell  carcinoma 9-10yrs ago    left ear       Past Surgical History:   Procedure Laterality Date    CATARACT EXTRACTION      FEMUR FRACTURE SURGERY Right     HIP FRACTURE SURGERY Left aprox 2010    implant for nerve pain      YAG OU         Time Tracking:     PT Received On: 11/20/22  PT Start Time: 1125     PT Stop Time: 1153  PT Total Time (min): 28 min     Billable Minutes: Evaluation 14 and Gait Training 14      11/20/2022

## 2022-11-20 NOTE — CARE UPDATE
Discussed with neurosurgeon Dr. Gramajo, from [facility] yesterday evening who stated that patient might not even require bracing, stated that she would document recommendations.

## 2022-11-20 NOTE — ASSESSMENT & PLAN NOTE
Nonsurgical management  Pain management, bracing  PT/OT  -11/20/22-  chronic appearing L2, T12, T11, and T10 compression fractures.  There is some retropulsion at L2 with moderate central canal stenosis per Neurosurgery with brace recommended in the setting of back pain- analgesia as needed

## 2022-11-20 NOTE — HOSPITAL COURSE
Pt admitted to Observation s/p fall in the setting of SVT. Troponin elevated and Cardiology consulted. CT of head showed left parietal scalp contusion with small hematoma.  Negative for skull fracture and no acute intracranial hemorrhage.  Age-appropriate advanced cerebral atrophy with advanced deep white matter microangiopathy sequela. Xray of shoulder and ribs show no acute fracture. CT of cervical spine showed no CT evidence of acute posttraumatic injury osseous cervical spine.  Multilevel advanced facet arthropathy with multilevel grade 1 spondylolisthesis.  Scattered cervical spondylosis. CT of chest, abdomen, and pelvis showed Multiple thoracolumbar compression fracture deformities including T7, T10, and L2 with age-indeterminate T10 fracture.  There is retropulsion associated with the T10 and L2 fractures with subsequent spinal canal stenosis. Remote healed fracture deformities left superior and inferior pubic rami.  Status post ORIF bilateral femurs.  The bones appear osteopenic. Right middle lobe and right lower lobe clustered nodular opacities with associated traction bronchiectasis likely post inflammatory). HM discussed case with Neurosurgery and no surgical intervention recommended and bracing recommended only in the presence of back pain. Spontaneous resolution of note with beta blocker initiated at low dose. Serial troponin results in progress. PT/OT evaluation in progress. On 11/21/22, PT/OT evaluation completed with SNF placement recommended. Troponin trending down. On 11/22/22, Pt accepted to [facility] with authorization obtained. Pt seen and examined and deemed stable for discharge to accepting SNF. Medications reconciled. Patient/family instructed to follow up with PCP, Cardiology, and Orthopedic Surgery upon discharge for further evaluation.

## 2022-11-20 NOTE — ASSESSMENT & PLAN NOTE
89 y.o. female patient with a PMHx of HTN, HLD, hearing loss, squamous cell carcinoma, and osteoporosis  admitted for fall and trauma. Consult is for SVT. Tele strips reviewed, transient SVT- 3 seconds 150 bpm   Converted to NSR. EKG normal pt asymptomatic.Patient denies CP, angina or anginal equivalent or palpitations.     Add low dose B blockers, should be able to tolerate with BP up po650i/80s

## 2022-11-20 NOTE — SUBJECTIVE & OBJECTIVE
Interval History: pt in bed upon exam and reports left rib pain. PT/OT evaluation ordered. Troponin elevated due to demand with serial results in progress. NSR on monitor. Cardiology following.     Review of Systems   Constitutional:  Positive for activity change. Negative for appetite change, fatigue and fever.   HENT:  Negative for congestion, postnasal drip, sinus pressure, sore throat and trouble swallowing.    Respiratory:  Negative for cough, shortness of breath and wheezing.    Cardiovascular:  Negative for chest pain, palpitations and leg swelling.   Gastrointestinal:  Positive for abdominal pain. Negative for abdominal distention, nausea and vomiting.   Genitourinary:  Negative for difficulty urinating, dysuria, flank pain and urgency.   Musculoskeletal:  Positive for neck pain. Negative for arthralgias, back pain and myalgias.   Skin:  Negative for color change and wound.   Neurological:  Positive for dizziness and weakness. Negative for headaches.   Psychiatric/Behavioral:  Negative for agitation, confusion and sleep disturbance. The patient is not nervous/anxious.    Objective:     Vital Signs (Most Recent):  Temp: 98.3 °F (36.8 °C) (11/20/22 1119)  Pulse: 74 (11/20/22 1123)  Resp: 18 (11/20/22 1119)  BP: (!) 149/68 (11/20/22 1119)  SpO2: 96 % (11/20/22 1119)   Vital Signs (24h Range):  Temp:  [97.5 °F (36.4 °C)-98.3 °F (36.8 °C)] 98.3 °F (36.8 °C)  Pulse:  [] 74  Resp:  [17-20] 18  SpO2:  [96 %-100 %] 96 %  BP: (134-162)/(68-82) 149/68     Weight: 44.9 kg (98 lb 15.8 oz)  Body mass index is 17.53 kg/m².    Intake/Output Summary (Last 24 hours) at 11/20/2022 1221  Last data filed at 11/20/2022 0900  Gross per 24 hour   Intake 358 ml   Output 100 ml   Net 258 ml      Physical Exam  HENT:      Right Ear: Decreased hearing noted.      Left Ear: Decreased hearing noted.      Mouth/Throat:      Mouth: Mucous membranes are dry.      Pharynx: Oropharynx is clear.   Cardiovascular:      Rate and Rhythm:  Normal rate and regular rhythm.      Pulses: Normal pulses.      Heart sounds: Normal heart sounds.   Pulmonary:      Effort: Pulmonary effort is normal.      Breath sounds: Normal breath sounds.   Abdominal:      General: Bowel sounds are normal. There is no distension.      Palpations: Abdomen is soft.      Tenderness: There is no abdominal tenderness.   Genitourinary:     Comments: Deferred   Musculoskeletal:         General: Normal range of motion.      Cervical back: Normal range of motion.      Comments: Rib tenderness reported on L side    Skin:     General: Skin is warm and dry.   Neurological:      Mental Status: She is alert and oriented to person, place, and time.   Psychiatric:         Mood and Affect: Mood normal.         Behavior: Behavior normal.       Significant Labs: All pertinent labs within the past 24 hours have been reviewed.  CBC:   Recent Labs   Lab 11/19/22  0759   WBC 10.13   HGB 12.1   HCT 38.0        CMP:   Recent Labs   Lab 11/19/22  0759      K 3.6      CO2 22*      BUN 8*   CREATININE 0.7   CALCIUM 9.3   PROT 7.8   ALBUMIN 3.6   BILITOT 0.5   ALKPHOS 77   AST 18   ALT 13   ANIONGAP 13     Troponin:   Recent Labs   Lab 11/19/22  0759   TROPONINI 0.154*       Significant Imaging: I have reviewed all pertinent imaging results/findings within the past 24 hours.

## 2022-11-20 NOTE — PLAN OF CARE
Patient updated on plan of care. Instructed patient to use call light for assistance, call light in reach. Hourly rounding performed, bed locked, side rails up, personal items within reach, bed alarm on, and in low position. Patient ambulated in room with PT/OT; up in chair for 1 hours. Pain managed with PRN medications. Waffle overlay and frequent repositioning for redness to back and nonblanchable redness to sacrum. Foam mepilex and barrier cream applied. VSS; no SVT during shift.  Education provided, questions answered as of now. NSR on Tele box #[id number].

## 2022-11-21 LAB
ANION GAP SERPL CALC-SCNC: 12 MMOL/L (ref 8–16)
BASOPHILS # BLD AUTO: 0.03 K/UL (ref 0–0.2)
BASOPHILS NFR BLD: 0.4 % (ref 0–1.9)
BUN SERPL-MCNC: 15 MG/DL (ref 10–30)
CALCIUM SERPL-MCNC: 9 MG/DL (ref 8.7–10.5)
CHLORIDE SERPL-SCNC: 102 MMOL/L (ref 95–110)
CO2 SERPL-SCNC: 24 MMOL/L (ref 23–29)
CREAT SERPL-MCNC: 0.6 MG/DL (ref 0.5–1.4)
DIFFERENTIAL METHOD: ABNORMAL
EOSINOPHIL # BLD AUTO: 0 K/UL (ref 0–0.5)
EOSINOPHIL NFR BLD: 0.1 % (ref 0–8)
ERYTHROCYTE [DISTWIDTH] IN BLOOD BY AUTOMATED COUNT: 14.8 % (ref 11.5–14.5)
EST. GFR  (NO RACE VARIABLE): >60 ML/MIN/1.73 M^2
GLUCOSE SERPL-MCNC: 105 MG/DL (ref 70–110)
HCT VFR BLD AUTO: 38.1 % (ref 37–48.5)
HGB BLD-MCNC: 11.9 G/DL (ref 12–16)
IMM GRANULOCYTES # BLD AUTO: 0.04 K/UL (ref 0–0.04)
IMM GRANULOCYTES NFR BLD AUTO: 0.5 % (ref 0–0.5)
LYMPHOCYTES # BLD AUTO: 0.8 K/UL (ref 1–4.8)
LYMPHOCYTES NFR BLD: 10.7 % (ref 18–48)
MCH RBC QN AUTO: 27.3 PG (ref 27–31)
MCHC RBC AUTO-ENTMCNC: 31.2 G/DL (ref 32–36)
MCV RBC AUTO: 87 FL (ref 82–98)
MONOCYTES # BLD AUTO: 0.5 K/UL (ref 0.3–1)
MONOCYTES NFR BLD: 6.7 % (ref 4–15)
NEUTROPHILS # BLD AUTO: 6.2 K/UL (ref 1.8–7.7)
NEUTROPHILS NFR BLD: 81.6 % (ref 38–73)
NRBC BLD-RTO: 0 /100 WBC
PLATELET # BLD AUTO: 223 K/UL (ref 150–450)
PMV BLD AUTO: 10.3 FL (ref 9.2–12.9)
POTASSIUM SERPL-SCNC: 3.4 MMOL/L (ref 3.5–5.1)
RBC # BLD AUTO: 4.36 M/UL (ref 4–5.4)
SODIUM SERPL-SCNC: 138 MMOL/L (ref 136–145)
TROPONIN I SERPL DL<=0.01 NG/ML-MCNC: 0.03 NG/ML (ref 0–0.03)
WBC # BLD AUTO: 7.65 K/UL (ref 3.9–12.7)

## 2022-11-21 PROCEDURE — 25000003 PHARM REV CODE 250: Performed by: STUDENT IN AN ORGANIZED HEALTH CARE EDUCATION/TRAINING PROGRAM

## 2022-11-21 PROCEDURE — 94761 N-INVAS EAR/PLS OXIMETRY MLT: CPT

## 2022-11-21 PROCEDURE — 80048 BASIC METABOLIC PNL TOTAL CA: CPT | Performed by: NURSE PRACTITIONER

## 2022-11-21 PROCEDURE — 85025 COMPLETE CBC W/AUTO DIFF WBC: CPT | Performed by: NURSE PRACTITIONER

## 2022-11-21 PROCEDURE — G0378 HOSPITAL OBSERVATION PER HR: HCPCS

## 2022-11-21 PROCEDURE — 97116 GAIT TRAINING THERAPY: CPT

## 2022-11-21 PROCEDURE — 25000003 PHARM REV CODE 250: Performed by: INTERNAL MEDICINE

## 2022-11-21 PROCEDURE — 97110 THERAPEUTIC EXERCISES: CPT

## 2022-11-21 PROCEDURE — 25000003 PHARM REV CODE 250: Performed by: NURSE PRACTITIONER

## 2022-11-21 PROCEDURE — 36415 COLL VENOUS BLD VENIPUNCTURE: CPT | Performed by: NURSE PRACTITIONER

## 2022-11-21 PROCEDURE — 84484 ASSAY OF TROPONIN QUANT: CPT | Performed by: NURSE PRACTITIONER

## 2022-11-21 RX ORDER — DOCUSATE SODIUM 100 MG/1
100 CAPSULE, LIQUID FILLED ORAL 2 TIMES DAILY
Status: DISCONTINUED | OUTPATIENT
Start: 2022-11-21 | End: 2022-11-22 | Stop reason: HOSPADM

## 2022-11-21 RX ORDER — HYDRALAZINE HYDROCHLORIDE 20 MG/ML
10 INJECTION INTRAMUSCULAR; INTRAVENOUS ONCE
Status: DISCONTINUED | OUTPATIENT
Start: 2022-11-21 | End: 2022-11-21

## 2022-11-21 RX ORDER — HYDRALAZINE HYDROCHLORIDE 20 MG/ML
10 INJECTION INTRAMUSCULAR; INTRAVENOUS EVERY 8 HOURS PRN
Status: DISCONTINUED | OUTPATIENT
Start: 2022-11-21 | End: 2022-11-22 | Stop reason: HOSPADM

## 2022-11-21 RX ORDER — METOPROLOL TARTRATE 25 MG/1
25 TABLET, FILM COATED ORAL 2 TIMES DAILY
Status: DISCONTINUED | OUTPATIENT
Start: 2022-11-21 | End: 2022-11-22 | Stop reason: HOSPADM

## 2022-11-21 RX ADMIN — OXYCODONE HYDROCHLORIDE 5 MG: 5 TABLET ORAL at 10:11

## 2022-11-21 RX ADMIN — DOCUSATE SODIUM 100 MG: 100 CAPSULE, LIQUID FILLED ORAL at 09:11

## 2022-11-21 RX ADMIN — METOPROLOL TARTRATE 12.5 MG: 25 TABLET, FILM COATED ORAL at 10:11

## 2022-11-21 RX ADMIN — OXYCODONE HYDROCHLORIDE 5 MG: 5 TABLET ORAL at 06:11

## 2022-11-21 RX ADMIN — METOPROLOL TARTRATE 25 MG: 25 TABLET, FILM COATED ORAL at 09:11

## 2022-11-21 NOTE — PLAN OF CARE
Nutrition 11/21  1. Recommend pt PO diet order be modified to Cardiac, Mechanical soft IDDSI 6 when medically appropriate.   2. Recommend pt receives chocolate boost plus TID on tray with meals.   3. Recommend pt receives Brendon BID on tray with meals.   4. Weekly weights.  5. Collaboration of care with medical providers.    Farrukh Aguilar, Registration Eligible, Provisional LDN

## 2022-11-21 NOTE — ASSESSMENT & PLAN NOTE
Nonsurgical management  Pain management, bracing  PT/OT  -11/20/22-  chronic appearing L2, T12, T11, and T10 compression fractures.  There is some retropulsion at L2 with moderate central canal stenosis per Neurosurgery with brace recommended in the setting of back pain  - analgesia as needed   -review of imaging per Neurosurgery appears fractures are chronic in nature

## 2022-11-21 NOTE — ASSESSMENT & PLAN NOTE
Status post mechanical fall   Patient denied palpitations, dizziness/loss of consciousness prior to the episode;  On arrival- left occipital region of head has a small laceration; Lacerations addressed in ED;  CT head:    Left parietal scalp contusion with small hematoma.  Negative for skull fracture.  No acute intracranial hemorrhage.  CT cervical spine: no CT evidence of acute posttraumatic injury osseous cervical spine.  CT C/ A/P: Multiple thoracolumbar compression fracture deformities including T7, T10, and L2, as detailed above, with age-indeterminate T10 fracture.  There is retropulsion associated with the T10 and L2 fractures with subsequent spinal canal stenosis;  ED physician stated that he initiated transfer process to [place] given anticipated need for neurosurgical intervention.  Stated that he discussed with neurosurgeon from [place], who stated that given patient's age, chronic findings of the CT scan, no need for neurosurgical intervention at this point, provide comfort measures including pain management, bracing.  Neurovascularly intact on physical examination  Pain management, arrangements for bracing, follow up with Neurosurgery at [facility]- no surgical intervention recommended and bracing needed in the presence of back pain   PT/OT evaluation ordered   CPK negative   -Neurosurgery consulted with imaging reviewed and recommendations received   -PT/OT- evaluation completed with SNF placement recommended

## 2022-11-21 NOTE — ASSESSMENT & PLAN NOTE
Patient denied any history of arrhythmias, heart failure, coronary artery disease.    As per EMS report noted to have AFib  In ED noted to be in SVTs with intermittent runs of AFib with transition to sinus rhythm;  Blood pressure is stable  EKG, tele monitoring, cardio follow-up  -11/20/22- NSR on monitor- Cardiology following with low dose BB initiated  -current plan of care continued

## 2022-11-21 NOTE — PLAN OF CARE
11/21/22 1544   Post-Acute Status   Post-Acute Authorization Placement   Post-Acute Placement Status Pending post-acute provider review/more information requested   Discharge Delays None known at this time   Discharge Plan   Discharge Plan A Skilled Nursing Facility     BREONNA received call from pt's son, Marquez, stating that preferred facility to move forward with will be either [facility] or [facility].   [facility] clinically accepting and will f/u with pt's son.   SW to f/u on auth status. BREONNA requested auth be submitted today.

## 2022-11-21 NOTE — SUBJECTIVE & OBJECTIVE
Interval History: pt in bed upon exam and reports mild symptom improvement. PT/OT evaluation completed with SNF placement recommended.  Social Work consulted to assist with placement.     Review of Systems   Constitutional:  Positive for activity change. Negative for appetite change, fatigue and fever.   HENT:  Negative for congestion, postnasal drip, sinus pressure, sore throat and trouble swallowing.    Respiratory:  Negative for cough, shortness of breath and wheezing.    Cardiovascular:  Negative for chest pain, palpitations and leg swelling.   Gastrointestinal:  Positive for abdominal pain. Negative for abdominal distention, nausea and vomiting.   Genitourinary:  Negative for difficulty urinating, dysuria, flank pain and urgency.   Musculoskeletal:  Positive for neck pain. Negative for arthralgias, back pain and myalgias.   Skin:  Negative for color change and wound.   Neurological:  Positive for dizziness (improved) and weakness. Negative for headaches.   Psychiatric/Behavioral:  Negative for agitation, confusion and sleep disturbance. The patient is not nervous/anxious.    Objective:     Vital Signs (Most Recent):  Temp: 98.7 °F (37.1 °C) (11/21/22 1032)  Pulse: 66 (11/21/22 1032)  Resp: 18 (11/21/22 1032)  BP: (!) 145/68 (11/21/22 1032)  SpO2: 97 % (11/21/22 1032)   Vital Signs (24h Range):  Temp:  [97.4 °F (36.3 °C)-98.7 °F (37.1 °C)] 98.7 °F (37.1 °C)  Pulse:  [54-81] 66  Resp:  [15-18] 18  SpO2:  [94 %-97 %] 97 %  BP: (121-175)/(58-91) 145/68     Weight: 44.9 kg (98 lb 15.8 oz)  Body mass index is 17.53 kg/m².    Intake/Output Summary (Last 24 hours) at 11/21/2022 1155  Last data filed at 11/20/2022 1200  Gross per 24 hour   Intake 118 ml   Output --   Net 118 ml      Physical Exam  HENT:      Right Ear: Decreased hearing noted.      Left Ear: Decreased hearing noted.      Nose: Nose normal.      Mouth/Throat:      Mouth: Mucous membranes are dry.      Pharynx: Oropharynx is clear.   Cardiovascular:       Rate and Rhythm: Normal rate and regular rhythm.      Pulses: Normal pulses.      Heart sounds: Normal heart sounds.   Pulmonary:      Effort: Pulmonary effort is normal.      Breath sounds: Normal breath sounds.   Abdominal:      General: Bowel sounds are normal. There is no distension.      Palpations: Abdomen is soft.      Tenderness: There is no abdominal tenderness.   Genitourinary:     Comments: Deferred   Musculoskeletal:         General: Normal range of motion.      Cervical back: Normal range of motion.      Comments: Rib tenderness reported on L side    Skin:     General: Skin is warm and dry.   Neurological:      Mental Status: She is alert and oriented to person, place, and time.   Psychiatric:         Mood and Affect: Mood normal.         Behavior: Behavior normal.       Significant Labs: All pertinent labs within the past 24 hours have been reviewed.  CBC:   Recent Labs   Lab 11/20/22  1223   WBC 8.37   HGB 11.3*   HCT 36.2*        CMP:   Recent Labs   Lab 11/20/22  1223      K 3.5      CO2 20*   *   BUN 16   CREATININE 0.7   CALCIUM 9.2   ANIONGAP 14     POCT Glucose: No results for input(s): POCTGLUCOSE in the last 48 hours.  Troponin:   Recent Labs   Lab 11/20/22  1223   TROPONINI 0.071*       Significant Imaging: I have reviewed all pertinent imaging results/findings within the past 24 hours.

## 2022-11-21 NOTE — PLAN OF CARE
[facility] - Telemetry (Hospital)  Initial Discharge Assessment    SW met with patient and sonMarquez, at the bedside to complete assessment.     PLOF: Pt lives at home alone   Help @ Home: Pt's son and grandson  DME: w/c , RW, BSC, shower chair, cane etc   Transportation: Pt's sonMarquez     Comment: SW discussed SNF placement with patient and son at the bedside. Family agreeable to placement. SW provided family with SW contact information for additional questions.     Primary Care Provider: Marcie Espinal MD    Admission Diagnosis: SVT (supraventricular tachycardia) [I47.1]  Chest pain [R07.9]  Near syncope [R55]  Fall, initial encounter [W19.XXXA]  Compression fracture of body of thoracic vertebra [S22.000A]    Admission Date: 11/19/2022  Expected Discharge Date:     Discharge Barriers Identified: None    Payor: [employer] MEDICARE / Plan: [employer] MEDICARE HMO / Product Type: Capitation /     Extended Emergency Contact Information  Primary Emergency Contact: Herve Olsen   [facility]  Home Phone: [phone]  Mobile Phone: [phone]  Relation: Son   needed? No  Secondary Emergency Contact: OdomBoni  Mobile Phone: [phone]  Relation: Neighbor  Preferred language: English   needed? No    Discharge Plan A: Skilled Nursing Facility         [facility] [facility] [place] - [address]  [address]  [place] [address]  Phone: [phone] Fax: [phone]    [id number] - [place] - [address] OF [facility] [address]  [address]  [place] [address]  Phone: [phone] Fax: [phone]    [facility] - [place], [place] - [address]  [address] [address]  Phone: [phone] Fax: [phone]      Initial Assessment (most recent)       Adult Discharge Assessment - 11/21/22 1504          Discharge  Assessment    Assessment Type Discharge Planning Assessment     Confirmed/corrected address, phone number and insurance Yes     Confirmed Demographics Correct on Facesheet     Source of Information family;patient     Communicated JULIANA with patient/caregiver Yes     Reason For Admission Fall     Lives With alone     Facility Arrived From: Home     Do you expect to return to your current living situation? Yes     Do you have help at home or someone to help you manage your care at home? Yes     Who are your caregiver(s) and their phone number(s)? Pt's son and grandson     Prior to hospitilization cognitive status: Alert/Oriented     Current cognitive status: Alert/Oriented     Walking or Climbing Stairs Difficulty ambulation difficulty, requires equipment     Mobility Management cane, RW     Dressing/Bathing Difficulty none     Equipment Currently Used at Home walker, rolling;wheelchair;cane, straight;bedside commode;shower chair     Readmission within 30 days? No     Patient currently being followed by outpatient case management? No     Do you currently have service(s) that help you manage your care at home? No     Do you take prescription medications? Yes     Do you have prescription coverage? Yes     Do you have any problems affording any of your prescribed medications? No     Is the patient taking medications as prescribed? yes     Who is going to help you get home at discharge? Pt's son     How do you get to doctors appointments? family or friend will provide     Are you on dialysis? No     Do you take coumadin? No     Discharge Plan A Skilled Nursing Facility     DME Needed Upon Discharge  none     Discharge Plan discussed with: Patient;Adult children     Discharge Barriers Identified None

## 2022-11-21 NOTE — PROGRESS NOTES
[facility] Medicine  Progress Note    Patient Name: Francie Olsen  MRN: [id number]  Patient Class: OP- Observation   Admission Date: 11/19/2022  Length of Stay: 0 days  Attending Physician: Mando Luna MD  Primary Care Provider: Marcie Espinal MD        Subjective:     Principal Problem:Fall        HPI:  Francie Olsen is a 89 y.o. female patient with a PMHx of HTN, HLD, hearing loss, squamous cell carcinoma, and osteoporosis who presents to the Emergency Department for evaluation of fall which occurred at 5 AM. Pt describes getting out of bed right before 5 AM, reaching to get her robe, falling over backwards, and hitting her head on the bathroom tile. Pt lives alone/ son and grand son lives next door. She denies being on blood thinners. She notes a fall similar to this occurred 1 year PTA, which resulted in 4 fractured vertebrae and an injured shoulder. EMS report pt being in afib between 50 and 180.  Symptoms are constant and moderate in severity. No mitigating or exacerbating factors reported. Associated sxs include neck pain, dizziness, and abd pain. Patient denies any CP, SOB, LOC, palpitations.     ED physician stated that he initiated transfer process to [place] given anticipated need for neurosurgical intervention.  Stated that he discussed with neurosurgeon from [place], who stated that given patient's age, chronic findings of the CT scan, no need for neurosurgical intervention at this point, provide comfort measures including pain management, bracing.    Stated that she ambulates with walker at home.    Patient alert and oriented x3,- code status DNR/DNI.  Also stated that she does not want acute interventions given her age.          Overview/Hospital Course:  Pt admitted to Observation s/p fall in the setting of SVT. Troponin elevated and Cardiology consulted. CT of head showed left parietal scalp contusion with small hematoma.  Negative for skull fracture and no acute  intracranial hemorrhage.  Age-appropriate advanced cerebral atrophy with advanced deep white matter microangiopathy sequela. Xray of shoulder and ribs show no acute fracture. CT of cervical spine showed no CT evidence of acute posttraumatic injury osseous cervical spine.  Multilevel advanced facet arthropathy with multilevel grade 1 spondylolisthesis.  Scattered cervical spondylosis. CT of chest, abdomen, and pelvis showed Multiple thoracolumbar compression fracture deformities including T7, T10, and L2 with age-indeterminate T10 fracture.  There is retropulsion associated with the T10 and L2 fractures with subsequent spinal canal stenosis. Remote healed fracture deformities left superior and inferior pubic rami.  Status post ORIF bilateral femurs.  The bones appear osteopenic. Right middle lobe and right lower lobe clustered nodular opacities with associated traction bronchiectasis likely post inflammatory). HM discussed case with Neurosurgery and no surgical intervention recommended and bracing recommended only in the presence of back pain. Spontaneous resolution of note with beta blocker initiated at low dose. Serial troponin results in progress. PT/OT evaluation in progress. On 11/21/22, PT/OT evaluation completed with SNF placement recommended.       Interval History: pt in bed upon exam and reports mild symptom improvement. PT/OT evaluation completed with SNF placement recommended.  Social Work consulted to assist with placement.     Review of Systems   Constitutional:  Positive for activity change. Negative for appetite change, fatigue and fever.   HENT:  Negative for congestion, postnasal drip, sinus pressure, sore throat and trouble swallowing.    Respiratory:  Negative for cough, shortness of breath and wheezing.    Cardiovascular:  Negative for chest pain, palpitations and leg swelling.   Gastrointestinal:  Positive for abdominal pain. Negative for abdominal distention, nausea and vomiting.    Genitourinary:  Negative for difficulty urinating, dysuria, flank pain and urgency.   Musculoskeletal:  Positive for neck pain. Negative for arthralgias, back pain and myalgias.   Skin:  Negative for color change and wound.   Neurological:  Positive for dizziness (improved) and weakness. Negative for headaches.   Psychiatric/Behavioral:  Negative for agitation, confusion and sleep disturbance. The patient is not nervous/anxious.    Objective:     Vital Signs (Most Recent):  Temp: 98.7 °F (37.1 °C) (11/21/22 1032)  Pulse: 66 (11/21/22 1032)  Resp: 18 (11/21/22 1032)  BP: (!) 145/68 (11/21/22 1032)  SpO2: 97 % (11/21/22 1032)   Vital Signs (24h Range):  Temp:  [97.4 °F (36.3 °C)-98.7 °F (37.1 °C)] 98.7 °F (37.1 °C)  Pulse:  [54-81] 66  Resp:  [15-18] 18  SpO2:  [94 %-97 %] 97 %  BP: (121-175)/(58-91) 145/68     Weight: 44.9 kg (98 lb 15.8 oz)  Body mass index is 17.53 kg/m².    Intake/Output Summary (Last 24 hours) at 11/21/2022 1155  Last data filed at 11/20/2022 1200  Gross per 24 hour   Intake 118 ml   Output --   Net 118 ml      Physical Exam  HENT:      Right Ear: Decreased hearing noted.      Left Ear: Decreased hearing noted.      Nose: Nose normal.      Mouth/Throat:      Mouth: Mucous membranes are dry.      Pharynx: Oropharynx is clear.   Cardiovascular:      Rate and Rhythm: Normal rate and regular rhythm.      Pulses: Normal pulses.      Heart sounds: Normal heart sounds.   Pulmonary:      Effort: Pulmonary effort is normal.      Breath sounds: Normal breath sounds.   Abdominal:      General: Bowel sounds are normal. There is no distension.      Palpations: Abdomen is soft.      Tenderness: There is no abdominal tenderness.   Genitourinary:     Comments: Deferred   Musculoskeletal:         General: Normal range of motion.      Cervical back: Normal range of motion.      Comments: Rib tenderness reported on L side    Skin:     General: Skin is warm and dry.   Neurological:      Mental Status: She is alert  and oriented to person, place, and time.   Psychiatric:         Mood and Affect: Mood normal.         Behavior: Behavior normal.       Significant Labs: All pertinent labs within the past 24 hours have been reviewed.  CBC:   Recent Labs   Lab 11/20/22  1223   WBC 8.37   HGB 11.3*   HCT 36.2*        CMP:   Recent Labs   Lab 11/20/22  1223      K 3.5      CO2 20*   *   BUN 16   CREATININE 0.7   CALCIUM 9.2   ANIONGAP 14     POCT Glucose: No results for input(s): POCTGLUCOSE in the last 48 hours.  Troponin:   Recent Labs   Lab 11/20/22  1223   TROPONINI 0.071*       Significant Imaging: I have reviewed all pertinent imaging results/findings within the past 24 hours.      Assessment/Plan:      * Fall  Status post mechanical fall   Patient denied palpitations, dizziness/loss of consciousness prior to the episode;  On arrival- left occipital region of head has a small laceration; Lacerations addressed in ED;  CT head:    Left parietal scalp contusion with small hematoma.  Negative for skull fracture.  No acute intracranial hemorrhage.  CT cervical spine: no CT evidence of acute posttraumatic injury osseous cervical spine.  CT C/ A/P: Multiple thoracolumbar compression fracture deformities including T7, T10, and L2, as detailed above, with age-indeterminate T10 fracture.  There is retropulsion associated with the T10 and L2 fractures with subsequent spinal canal stenosis;  ED physician stated that he initiated transfer process to [place] given anticipated need for neurosurgical intervention.  Stated that he discussed with neurosurgeon from [place], who stated that given patient's age, chronic findings of the CT scan, no need for neurosurgical intervention at this point, provide comfort measures including pain management, bracing.  Neurovascularly intact on physical examination  Pain management, arrangements for bracing, follow up with Neurosurgery at [facility]- no surgical intervention  recommended and bracing needed in the presence of back pain   PT/OT evaluation ordered   CPK negative   -Neurosurgery consulted with imaging reviewed and recommendations received   -PT/OT- evaluation completed with SNF placement recommended     Compression fracture of body of thoracic vertebra  Nonsurgical management  Pain management, bracing  PT/OT  -11/20/22-  chronic appearing L2, T12, T11, and T10 compression fractures.  There is some retropulsion at L2 with moderate central canal stenosis per Neurosurgery with brace recommended in the setting of back pain  - analgesia as needed   -review of imaging per Neurosurgery appears fractures are chronic in nature       SVT (supraventricular tachycardia)  Patient denied any history of arrhythmias, heart failure, coronary artery disease.    As per EMS report noted to have AFib  In ED noted to be in SVTs with intermittent runs of AFib with transition to sinus rhythm;  Blood pressure is stable  EKG, tele monitoring, cardio follow-up  -11/20/22- NSR on monitor- Cardiology following with low dose BB initiated  -current plan of care continued       Hypertension  -Lopressor dose increased   -pain controlled  -will monitor     VTE Risk Mitigation (From admission, onward)           Ordered     IP VTE HIGH RISK PATIENT  Once         11/19/22 1713     Place sequential compression device  Until discontinued         11/19/22 1713     Place sequential compression device  Until discontinued         11/19/22 1443                    Discharge Planning   JULIANA:      Code Status: Full Code   Is the patient medically ready for discharge?:     Reason for patient still in hospital (select all that apply): Patient trending condition, Laboratory test, Treatment, Imaging and PT / OT recommendations                     Fay Darby NP  Department of Hospital Medicine   [facility] - Telemetry ([facility])

## 2022-11-21 NOTE — CONSULTS
[facility] - Telemetry ([facility])  Adult Nutrition  Consult Note    SUMMARY     Recommendations  1. Recommend pt PO diet order be modified to Cardiac, Mechanical soft IDDSI 6 when medically appropriate.   2. Recommend pt receives chocolate boost plus TID on tray with meals.   3. Recommend pt receives Brendon BID on tray with meals.   4. Weekly weights.    Goals:   1. Pt diet order will be modified within 24hrs.   2. Pt will consume >75% of energy needs by RD follow up.  Nutrition Goal Status: new  Communication of RD Recs: reviewed with RN (POC: Sticky Note)    Assessment and Plan  Nutrition Problem:  Moderate Protein-Calorie Malnutrition  Malnutrition in the context of Chronic Illness/Injury    Related to (etiology):  Decreased appetite, Altered taste    Signs and Symptoms (as evidenced by):  Energy Intake: <50% of estimated energy requirement for x 5 days  Body Fat Depletion: mild and moderate depletion of orbitals and triceps   Muscle Mass Depletion: mild and moderate depletion of temples, clavicle region, scapular region, and lower extremities   Weight Loss:   Fluid Accumulation:    Interventions(treatment strategy):  1. Recommend pt PO diet order be modified to Cardiac, Mechanical soft IDDSI 6 when medically appropriate.   2. Recommend pt receives chocolate boost plus TID on tray with meals.   3. Recommend pt receives Brendon BID on tray with meals.   4. Weekly weights.  5. Collaboration of care with medical providers.    Nutrition Diagnosis Status:  New        Malnutrition Assessment  Malnutrition Type: chronic illness  Energy Intake: severe energy intake  Skin (Micronutrient): bruised, cracked, dry, scaly, wounds unhealed   Micronutrient Evaluation: suspected deficiency   Energy Intake (Malnutrition): less than or equal to 50% for greater than or equal to 5 days  Subcutaneous Fat (Malnutrition): moderate depletion  Muscle Mass (Malnutrition): moderate depletion   Orbital Region (Subcutaneous Fat Loss): mild  "depletion  Upper Arm Region (Subcutaneous Fat Loss): mild depletion   [place] Region (Muscle Loss): moderate depletion  Clavicle Bone Region (Muscle Loss): mild depletion  Clavicle and Acromion Bone Region (Muscle Loss): moderate depletion  Dorsal Hand (Muscle Loss): mild depletion       Subcutaneous Fat Loss (Final Summary): mild protein-calorie malnutrition  Muscle Loss Evaluation (Final Summary): moderate protein-calorie malnutrition         Reason for Assessment    Reason For Assessment: consult  Diagnosis: other (see comments) (Fall)  Relevant Medical History: HTN, HLD, Hearing loss,  General Information Comments:   11/21: 89 y.o. female admitted d/t recent fall. NFPE preformed, moderate muscle wasting and fat depletion. Pt states that she has lost 25lbs within the past year. Pt states that she is having a hard time chewing foods d/t to dentures. Pt states that her appetite has been decreasing for the past year and states that it is d/t foods not having much taste/ flavor. Pt LBM 11/19. Per chart review and after speaking with pt nurse, the pt intake is >50%. Pt denies v/n, swallowing difficulties, abdominal distention. Pt has non-blanchable redness on sacrum. Pt states that she takes medication for chronic constipation. Will continue to monitor.  Nutrition Discharge Planning: Liberalized general healthy diet    Nutrition Risk Screen    Nutrition Risk Screen: large or nonhealing wound, burn or pressure injury    Nutrition/Diet History    Spiritual, Cultural Beliefs, Judaism Practices, Values that Affect Care: no  Food Allergies: NKFA  Factors Affecting Nutritional Intake: altered taste, constipation, decreased appetite, chewing difficulties/inability to chew food    Anthropometrics    Temp: 98.7 °F (37.1 °C)  Height Method: (S) Stated  Height: 5' 3" (160 cm)  Height (inches): 63 in  Weight Method: Bed Scale  Weight: 44.9 kg (98 lb 15.8 oz)  Weight (lb): 98.99 lb  Ideal Body Weight (IBW), Female: 115 lb  % " Ideal Body Weight, Female (lb): 86.08 %  BMI (Calculated): 17.5  BMI Grade: 17 - 18.4 protein-energy malnutrition grade I       Lab/Procedures/Meds  BMP  Lab Results   Component Value Date     11/21/2022    K 3.4 (L) 11/21/2022     11/21/2022    CO2 24 11/21/2022    BUN 15 11/21/2022    CREATININE 0.6 11/21/2022    CALCIUM 9.0 11/21/2022    ANIONGAP 12 11/21/2022    EGFRNORACEVR >60 11/21/2022       Pertinent Labs Reviewed: reviewed  Pertinent Medications Reviewed: reviewed  Scheduled Meds:   metoprolol tartrate  25 mg Oral BID     Continuous Infusions:  PRN Meds:.acetaminophen, dextrose 10%, dextrose 10%, glucagon (human recombinant), glucose, glucose, hydrALAZINE, influenza 65up-adj, melatonin, morphine, naloxone, oxyCODONE, sodium chloride 0.9%, sodium chloride 0.9%      Estimated/Assessed Needs    Weight Used For Calorie Calculations: 44.9 kg (98 lb 15.8 oz)  Energy Calorie Requirements (kcal): 9446-2922 (30-35kcal/kg d/t underweight)  Energy Need Method: Kcal/kg  Protein Requirements: 54-67 (1.2-1.5g/kg d/t under wt)  Weight Used For Protein Calculations: 44.9 kg (98 lb 15.8 oz)  Fluid Requirements (mL): 1347  Estimated Fluid Requirement Method: RDA Method  RDA Method (mL): 1347  CHO Requirement: 168      Nutrition Prescription Ordered    Current Diet Order: Diet Cardiac    Evaluation of Received Nutrient/Fluid Intake    % Kcal Needs: 50  % Protein Needs: 50  I/O: +736  Energy Calories Required: not meeting needs  Protein Required: not meeting needs  Fluid Required: not meeting needs  % Intake of Estimated Energy Needs: 25 - 50 %  % Meal Intake: 25 - 50 %    Nutrition Risk    Level of Risk/Frequency of Follow-up: high       Monitor and Evaluation    Food and Nutrient Intake: energy intake, food and beverage intake  Food and Nutrient Adminstration: diet order  Knowledge/Beliefs/Attitudes: food and nutrition knowledge/skill, beliefs and attitudes  Anthropometric Measurements: weight, weight change,  body mass index  Biochemical Data, Medical Tests and Procedures: electrolyte and renal panel, gastrointestinal profile, glucose/endocrine profile, inflammatory profile, lipid profile  Nutrition-Focused Physical Findings: overall appearance       Nutrition Follow-Up    RD Follow-up?: Yes  Farrukh Aguilar, Registration Eligible, Provisional LDN

## 2022-11-21 NOTE — CONSULTS
"SW met with patient and son, "Marquez", at the bedside to discuss SNF consult. Family agreeable to placment. In-network SNF options reviewed with family at the bedside. Preference obtained for the following facilities: [facility].     SW to f/u on accepting facilities to discuss with pt/family.     CM initial assessment to follow.   "

## 2022-11-22 ENCOUNTER — TELEPHONE (OUTPATIENT)
Dept: ADMINISTRATIVE | Facility: HOSPITAL | Age: 87
End: 2022-11-22
Payer: MEDICARE

## 2022-11-22 VITALS
RESPIRATION RATE: 15 BRPM | BODY MASS INDEX: 19.61 KG/M2 | HEIGHT: 63 IN | OXYGEN SATURATION: 93 % | HEART RATE: 64 BPM | WEIGHT: 110.69 LBS | TEMPERATURE: 98 F | SYSTOLIC BLOOD PRESSURE: 111 MMHG | DIASTOLIC BLOOD PRESSURE: 53 MMHG

## 2022-11-22 PROBLEM — R79.89 ELEVATED TROPONIN: Status: ACTIVE | Noted: 2022-11-22

## 2022-11-22 PROCEDURE — 63600175 PHARM REV CODE 636 W HCPCS: Performed by: NURSE PRACTITIONER

## 2022-11-22 PROCEDURE — 90694 VACC AIIV4 NO PRSRV 0.5ML IM: CPT | Performed by: STUDENT IN AN ORGANIZED HEALTH CARE EDUCATION/TRAINING PROGRAM

## 2022-11-22 PROCEDURE — G0378 HOSPITAL OBSERVATION PER HR: HCPCS

## 2022-11-22 PROCEDURE — 63600175 PHARM REV CODE 636 W HCPCS: Performed by: STUDENT IN AN ORGANIZED HEALTH CARE EDUCATION/TRAINING PROGRAM

## 2022-11-22 PROCEDURE — 25000003 PHARM REV CODE 250: Performed by: STUDENT IN AN ORGANIZED HEALTH CARE EDUCATION/TRAINING PROGRAM

## 2022-11-22 PROCEDURE — 97116 GAIT TRAINING THERAPY: CPT | Mod: CQ

## 2022-11-22 PROCEDURE — 25000003 PHARM REV CODE 250: Performed by: NURSE PRACTITIONER

## 2022-11-22 PROCEDURE — 96375 TX/PRO/DX INJ NEW DRUG ADDON: CPT

## 2022-11-22 PROCEDURE — G0008 ADMIN INFLUENZA VIRUS VAC: HCPCS | Performed by: STUDENT IN AN ORGANIZED HEALTH CARE EDUCATION/TRAINING PROGRAM

## 2022-11-22 PROCEDURE — 97530 THERAPEUTIC ACTIVITIES: CPT

## 2022-11-22 PROCEDURE — 97530 THERAPEUTIC ACTIVITIES: CPT | Mod: CQ

## 2022-11-22 PROCEDURE — 90471 IMMUNIZATION ADMIN: CPT | Performed by: STUDENT IN AN ORGANIZED HEALTH CARE EDUCATION/TRAINING PROGRAM

## 2022-11-22 RX ORDER — METOPROLOL TARTRATE 25 MG/1
25 TABLET, FILM COATED ORAL 2 TIMES DAILY
Qty: 60 TABLET | Refills: 0 | Status: SHIPPED | OUTPATIENT
Start: 2022-11-22 | End: 2022-12-22

## 2022-11-22 RX ORDER — OXYCODONE HYDROCHLORIDE 15 MG/1
15 TABLET ORAL 2 TIMES DAILY PRN
Qty: 4 TABLET | Refills: 0 | Status: SHIPPED | OUTPATIENT
Start: 2022-11-22 | End: 2022-12-02 | Stop reason: HOSPADM

## 2022-11-22 RX ORDER — OXYCODONE HYDROCHLORIDE 5 MG/1
10 TABLET ORAL EVERY 8 HOURS PRN
Status: DISCONTINUED | OUTPATIENT
Start: 2022-11-22 | End: 2022-11-22 | Stop reason: HOSPADM

## 2022-11-22 RX ORDER — DOCUSATE SODIUM 100 MG/1
100 CAPSULE, LIQUID FILLED ORAL DAILY
Qty: 30 CAPSULE | Refills: 0 | Status: SHIPPED | OUTPATIENT
Start: 2022-11-22 | End: 2022-12-22

## 2022-11-22 RX ADMIN — METOPROLOL TARTRATE 25 MG: 25 TABLET, FILM COATED ORAL at 08:11

## 2022-11-22 RX ADMIN — DOCUSATE SODIUM 100 MG: 100 CAPSULE, LIQUID FILLED ORAL at 08:11

## 2022-11-22 RX ADMIN — ACETAMINOPHEN 500 MG: 500 TABLET ORAL at 11:11

## 2022-11-22 RX ADMIN — OXYCODONE HYDROCHLORIDE 5 MG: 5 TABLET ORAL at 08:11

## 2022-11-22 RX ADMIN — INFLUENZA A VIRUS A/VICTORIA/2570/2019 IVR-215 (H1N1) ANTIGEN (FORMALDEHYDE INACTIVATED), INFLUENZA A VIRUS A/DARWIN/6/2021 IVR-227 (H3N2) ANTIGEN (FORMALDEHYDE INACTIVATED), INFLUENZA B VIRUS B/AUSTRIA/1359417/2021 BVR-26 ANTIGEN (FORMALDEHYDE INACTIVATED), INFLUENZA B VIRUS B/PHUKET/3073/2013 BVR-1B ANTIGEN (FORMALDEHYDE INACTIVATED) 0.5 ML: 15; 15; 15; 15 INJECTION, SUSPENSION INTRAMUSCULAR at 04:11

## 2022-11-22 RX ADMIN — HYDRALAZINE HYDROCHLORIDE 10 MG: 20 INJECTION, SOLUTION INTRAMUSCULAR; INTRAVENOUS at 12:11

## 2022-11-22 RX ADMIN — OXYCODONE HYDROCHLORIDE 10 MG: 5 TABLET ORAL at 02:11

## 2022-11-22 NOTE — PT/OT/SLP PROGRESS
Occupational Therapy   Treatment    Name: Francie Olsen  MRN: [id number]  Admitting Diagnosis:  Fall       Recommendations:     Discharge Recommendations: nursing facility, skilled  Discharge Equipment Recommendations:  other (see comments) (TBD by next level of care)  Barriers to discharge:  None    Assessment:     Francie Olsen is a 89 y.o. female with a medical diagnosis of Fall.  She presents with the following performance deficits affecting function are weakness, impaired endurance, impaired self care skills, impaired functional mobility, gait instability, impaired balance, decreased coordination, decreased upper extremity function, decreased lower extremity function, pain, decreased ROM, impaired cardiopulmonary response to activity.     Rehab Prognosis:  Good; patient would benefit from acute skilled OT services to address these deficits and reach maximum level of function.       Plan:     Patient to be seen 2 x/week to address the above listed problems via self-care/home management, therapeutic activities, therapeutic exercises  Plan of Care Expires: 12/04/22  Plan of Care Reviewed with: patient    Subjective     Pain/Comfort:  Pain Rating 1: 5/10  Location - Side 1: Left  Location - Orientation 1: lower  Location 1: back  Pain Addressed 1: Other (see comments) (activity pacing)    Objective:     Communicated with: nurse Huffman and [facility] chart review prior to session.  Patient found supine with peripheral IV, PureWick, telemetry upon OT entry to room.    General Precautions: Standard, fall   Orthopedic Precautions:N/A   Braces: N/A  Respiratory Status: Room air     Occupational Performance:     Bed Mobility:    Patient completed Rolling/Turning to Left with  contact guard assistance  Patient completed Supine to Sit with contact guard assistance  Patient completed Sit to Supine with contact guard assistance   Lateral scoot x1 rep to HOB with Mod A.    Functional Mobility/Transfers:  Sit>stand from EOB with  Min A and RW.  Sit>stand from chair with Min A and RW.  Stand pivot t/f to chair with Min A and RW.  Stand pivot t/f from chair to EOB with Min A and RW.  Functional Mobility: Patient completed x30ft functional mobility with Min A and RW to increase dynamic standing balance and activity tolerance needed for ADL completion.   Pt performed ~6 side steps to L side HOB with Min A and RW.    Activities of Daily Living:  Grooming: setup washed face while in chair.    [facility] 6 Click ADL: 18    Treatment & Education:  Pt reporting dizziness and nausea following ambulation and t/f into chair. After several minutes, these symptoms did not resolve; pt requesting to return to bed. Educated on techniques to use to increase independence and decrease fall risk with functional transfers. Educated on importance of OOB activity and calling for A to meet needs. Encouraged completion of B UE AROM therex throughout the day to tolerance to increase functional strength and activity tolerance. Patient stated understanding and in agreement with POC.     Patient left HOB elevated with all lines intact, call button in reach, and nurse notified    GOALS:   Multidisciplinary Problems       Occupational Therapy Goals          Problem: Occupational Therapy    Goal Priority Disciplines Outcome Interventions   Occupational Therapy Goal     OT, PT/OT Ongoing, Progressing    Description: LTG'S TO BE MET IN 14 DAYS (12/4/22)    1)  PATIENT WILL PERFORM UB DRESSING WITH MOD (I) TO DECREASE (D) ON CAREGIVER.     2)  PATIENT WILL PERFORM LB DRESSING WITH SBA TO DECREASE (D) ON CAREGIVER..    3)  PATIENT WILL PERFORM TOILET T/F WITH SBA TO DECREASE (D) ON CAREGIVER..    4)  PATIENT WILL PERFORM BUE HEP FOR INCREASED FUNC STRENGTH AND ENDURANCE WITH MIN VC FOR PROPER TECHNIQUE TO INCREASE PATIENT'S SAFETY AND (I) WITH SELF CARE TASKS.                       Time Tracking:     OT Date of Treatment: 11/22/22  OT Start Time: 1450  OT Stop Time: 1530  OT Total  Time (min): 40 min    Billable Minutes:Therapeutic Activity 40    OT/LUIS MANUEL: OT      Karen Lemons OT     11/22/2022

## 2022-11-22 NOTE — ASSESSMENT & PLAN NOTE
0.154>0.071>0.031  -due to demand in the setting of SVT- converted to NSR  -downward trend noted   -recommendations per cardiology

## 2022-11-22 NOTE — DISCHARGE SUMMARY
[facility] - Telemetry ([facility]  Discharge Summary      Patient Name: Francie Olsen  MRN: [id number]  [facility]: [id number]  Patient Class: OP- Observation  Admission Date: 11/19/2022  Hospital Length of Stay: 0 days  Discharge Date and Time: 11/22/2022  4:36 PM  Attending Physician: Dr. Mando Luna   Discharging Provider: Fay Darby NP  Primary Care Provider: Marcie Espinal MD    Primary Care Team: Networked reference to record PCT     HPI:   Francie Olsen is a 89 y.o. female patient with a PMHx of HTN, HLD, hearing loss, squamous cell carcinoma, and osteoporosis who presents to the Emergency Department for evaluation of fall which occurred at 5 AM. Pt describes getting out of bed right before 5 AM, reaching to get her robe, falling over backwards, and hitting her head on the bathroom tile. Pt lives alone/ son and grand son lives next door. She denies being on blood thinners. She notes a fall similar to this occurred 1 year PTA, which resulted in 4 fractured vertebrae and an injured shoulder. EMS report pt being in afib between 50 and 180.  Symptoms are constant and moderate in severity. No mitigating or exacerbating factors reported. Associated sxs include neck pain, dizziness, and abd pain. Patient denies any CP, SOB, LOC, palpitations.     ED physician stated that he initiated transfer process to [place] given anticipated need for neurosurgical intervention.  Stated that he discussed with neurosurgeon from [place], who stated that given patient's age, chronic findings of the CT scan, no need for neurosurgical intervention at this point, provide comfort measures including pain management, bracing.    Stated that she ambulates with walker at home.    Patient alert and oriented x3,- code status DNR/DNI.  Also stated that she does not want acute interventions given her age.          * No surgery found *      Hospital Course:   Pt admitted to Observation s/p fall in the setting of SVT. Troponin  elevated and Cardiology consulted. CT of head showed left parietal scalp contusion with small hematoma.  Negative for skull fracture and no acute intracranial hemorrhage.  Age-appropriate advanced cerebral atrophy with advanced deep white matter microangiopathy sequela. Xray of shoulder and ribs show no acute fracture. CT of cervical spine showed no CT evidence of acute posttraumatic injury osseous cervical spine.  Multilevel advanced facet arthropathy with multilevel grade 1 spondylolisthesis.  Scattered cervical spondylosis. CT of chest, abdomen, and pelvis showed Multiple thoracolumbar compression fracture deformities including T7, T10, and L2 with age-indeterminate T10 fracture.  There is retropulsion associated with the T10 and L2 fractures with subsequent spinal canal stenosis. Remote healed fracture deformities left superior and inferior pubic rami.  Status post ORIF bilateral femurs.  The bones appear osteopenic. Right middle lobe and right lower lobe clustered nodular opacities with associated traction bronchiectasis likely post inflammatory). HM discussed case with Neurosurgery and no surgical intervention recommended and bracing recommended only in the presence of back pain. Spontaneous resolution of note with beta blocker initiated at low dose. Serial troponin results in progress. PT/OT evaluation in progress. On 11/21/22, PT/OT evaluation completed with SNF placement recommended. Troponin trending down. On 11/22/22, Pt accepted to [facility] with authorization obtained. Pt seen and examined and deemed stable for discharge to accepting SNF. Medications reconciled with home pain medication prescribed x 2 days upon discharge due to SNF placement. Patient/family instructed to follow up with PCP, Cardiology, and Orthopedic Surgery upon discharge for further evaluation.        Goals of Care Treatment Preferences:  Code Status: Full Code      Consults:   Consults (From admission, onward)          Status  Ordering Provider     Inpatient consult to Social Work/Case Management  Once        Provider:  (Not yet assigned)    Completed COURTNEY CHARLES     IP consult to case management  Once        Provider:  (Not yet assigned)    Completed KENNY CHANEY     Inpatient consult to Registered Dietitian/Nutritionist  Once        Provider:  (Not yet assigned)    Completed TEJAL COLEY     Inpatient consult to Cardiology  Once        Provider:  Joey Kaba MD    Completed TEJAL COLEY            Final Active Diagnoses:    Diagnosis Date Noted POA    PRINCIPAL PROBLEM:  Fall [W19.XXXA] 11/24/2021 Yes    Elevated troponin [R77.8] 11/22/2022 Yes    SVT (supraventricular tachycardia) [I47.1] 11/19/2022 Unknown    Compression fracture of body of thoracic vertebra [S22.000A] 11/19/2022 Unknown      Problems Resolved During this Admission:    Diagnosis Date Noted Date Resolved POA    Chest pain [R07.9] 11/19/2022 11/19/2022 Unknown    Near syncope [R55] 11/19/2022 11/19/2022 Unknown       Discharged Condition: stable    Disposition: Skilled Nursing Facility    Follow Up:   Follow-up Information       Marcie Espinal MD Follow up in 3 day(s).    Specialty: Family Medicine  Why: -hospital follow up  Contact information:  [address] [place] [address] [phone]               SOLANGE Delgado Follow up in 1 week(s).    Specialty: Cardiology  Why: hospital follow up  Contact information:  [address] [place] [address] [address] [place] [id number] [phone]               JOHN Lorenzo Jr, MD Follow up.    Specialty: Orthopedic Surgery  Why: -, As needed, If symptoms worsen- hospital follow up  Pt may see Taylor Walker for further evaluation  Contact information:  [address] [facility]  [address]  [place] [address] [phone]                           Patient Instructions:      Diet Cardiac     Notify your health care provider if you experience any of the following:  temperature >100.4      Notify your health care provider if you experience any of the following:  persistent nausea and vomiting or diarrhea     Notify your health care provider if you experience any of the following:  increased confusion or weakness     Notify your health care provider if you experience any of the following:  persistent dizziness, light-headedness, or visual disturbances     Notify your health care provider if you experience any of the following:  difficulty breathing or increased cough     Notify your health care provider if you experience any of the following:  severe uncontrolled pain     Activity as tolerated       Significant Diagnostic Studies: Labs: All labs within the past 24 hours have been reviewed    Pending Diagnostic Studies:       None           Medications:  Reconciled Home Medications:      Medication List        START taking these medications      docusate sodium 100 MG capsule  Commonly known as: COLACE  Take 1 capsule (100 mg total) by mouth once daily.     metoprolol tartrate 25 MG tablet  Commonly known as: LOPRESSOR  Take 1 tablet (25 mg total) by mouth 2 (two) times daily.            CHANGE how you take these medications      MOVANTIK 25 mg tablet  Generic drug: naloxegoL  Take 1 tablet by mouth once a day as needed for pain  What changed:   how much to take  how to take this  when to take this     oxyCODONE 15 MG Tab  Commonly known as: ROXICODONE  Take 1 tablet by mouth twice a day as needed for pain  What changed: Another medication with the same name was removed. Continue taking this medication, and follow the directions you see here.              Indwelling Lines/Drains at time of discharge:   Lines/Drains/Airways       None                   Time spent on the discharge of patient: > 38 minutes         Fay Darby NP  Department of Hospital Medicine  [facility])

## 2022-11-22 NOTE — SUBJECTIVE & OBJECTIVE
Interval History: pt in bed upon exam and continues to report left sided rib pain with pain medication adjusted. Pt accepted to [facility] for SNF placement with authorization pending.     Review of Systems   Constitutional:  Positive for activity change. Negative for appetite change, fatigue and fever.   HENT:  Negative for congestion, postnasal drip, sinus pressure, sore throat and trouble swallowing.    Respiratory:  Negative for cough, shortness of breath and wheezing.    Cardiovascular:  Negative for chest pain, palpitations and leg swelling.   Gastrointestinal:  Positive for abdominal pain. Negative for abdominal distention, nausea and vomiting.   Genitourinary:  Negative for difficulty urinating, dysuria, flank pain and urgency.   Musculoskeletal:  Positive for myalgias and neck pain. Negative for arthralgias and back pain.   Skin:  Negative for color change and wound.   Neurological:  Positive for dizziness (improved) and weakness. Negative for headaches.   Psychiatric/Behavioral:  Negative for agitation, confusion and sleep disturbance. The patient is not nervous/anxious.    Objective:     Vital Signs (Most Recent):  Temp: 97.9 °F (36.6 °C) (11/22/22 0751)  Pulse: (!) 57 (11/22/22 0752)  Resp: 16 (11/22/22 0829)  BP: (!) 162/71 (11/22/22 0752)  SpO2: 97 % (11/22/22 0752)   Vital Signs (24h Range):  Temp:  [97.4 °F (36.3 °C)-97.9 °F (36.6 °C)] 97.9 °F (36.6 °C)  Pulse:  [56-72] 57  Resp:  [16-17] 16  SpO2:  [91 %-99 %] 97 %  BP: (117-179)/(60-76) 162/71     Weight: 50.2 kg (110 lb 10.7 oz)  Body mass index is 19.6 kg/m².    Intake/Output Summary (Last 24 hours) at 11/22/2022 1122  Last data filed at 11/21/2022 1809  Gross per 24 hour   Intake 240 ml   Output 200 ml   Net 40 ml      Physical Exam  HENT:      Right Ear: Decreased hearing noted.      Left Ear: Decreased hearing noted.      Nose: Nose normal.      Mouth/Throat:      Mouth: Mucous membranes are dry.      Pharynx: Oropharynx is clear.    Cardiovascular:      Rate and Rhythm: Regular rhythm. Bradycardia present.      Pulses: Normal pulses.      Heart sounds: Normal heart sounds.   Pulmonary:      Effort: Pulmonary effort is normal.      Breath sounds: Normal breath sounds.   Abdominal:      General: Bowel sounds are normal. There is no distension.      Palpations: Abdomen is soft.      Tenderness: There is no abdominal tenderness.   Genitourinary:     Comments: Deferred   Musculoskeletal:         General: Normal range of motion.      Cervical back: Normal range of motion.      Comments: Rib tenderness reported on L side    Skin:     General: Skin is warm and dry.   Neurological:      Mental Status: She is alert and oriented to person, place, and time.   Psychiatric:         Mood and Affect: Mood normal.         Behavior: Behavior normal.       Significant Labs: All pertinent labs within the past 24 hours have been reviewed.  CBC:   Recent Labs   Lab 11/20/22  1223 11/21/22  1205   WBC 8.37 7.65   HGB 11.3* 11.9*   HCT 36.2* 38.1    223     CMP:   Recent Labs   Lab 11/20/22  1223 11/21/22  1205    138   K 3.5 3.4*    102   CO2 20* 24   * 105   BUN 16 15   CREATININE 0.7 0.6   CALCIUM 9.2 9.0   ANIONGAP 14 12       Significant Imaging: I have reviewed all pertinent imaging results/findings within the past 24 hours.

## 2022-11-22 NOTE — PLAN OF CARE
11/22/22 1325   Post-Acute Status   Post-Acute Authorization Placement   Post-Acute Placement Status Set-up Complete/Auth obtained   Discharge Delays None known at this time   Discharge Plan   Discharge Plan A Skilled Nursing Facility     SNF authorization received for patient to discharge to [facility] SNF. NP notified. SW to send discharge clinicals when available.  SW attempted to contact pt's son to provide update - no answer.   SW to f/u.

## 2022-11-22 NOTE — ASSESSMENT & PLAN NOTE
Patient denied any history of arrhythmias, heart failure, coronary artery disease.    As per EMS report noted to have AFib  In ED noted to be in SVTs with intermittent runs of AFib with transition to sinus rhythm;  Blood pressure is stable  EKG, tele monitoring, cardio follow-up  -11/20/22- NSR on monitor- Cardiology following with low dose BB initiated  -elevated troponin likely due to demand trending down   -current plan of care continued

## 2022-11-22 NOTE — NURSING
Patient Aox4, NSR box 8556, BLISS with weakness, on RA, VSS, external catheter, and small BM. Turned q2, bed in lowest locked position, hourly rounding, bed alarm on, and personal items within reach. PRN meds given per MAR for pain. Wound care saw patient today. Foam meplilex and barrier cream to sacrum. Bedside report given to PM RN to resume care.

## 2022-11-22 NOTE — PLAN OF CARE
11/22/22 1031   Post-Acute Status   Post-Acute Authorization Placement   Post-Acute Placement Status Pending payor review/awaiting authorization (if required)   Discharge Delays None known at this time   Discharge Plan   Discharge Plan A Skilled Nursing Facility     Family agreeable to placement with [facility]. [facility] to submit for authorization today.  SW to f/u on auth status. NP updated.

## 2022-11-22 NOTE — PLAN OF CARE
[facility] - Telemetry (Hospital)  Discharge Final Note    Primary Care Provider: Marcie Espinal MD    Expected Discharge Date: 11/22/2022    Final Discharge Note (most recent)       Final Note - 11/22/22 1600          Final Note    Assessment Type Final Discharge Note     Anticipated Discharge Disposition Skilled Nursing Facility     Hospital Resources/Appts/Education Provided Post-Acute resouces added to AVS        Post-Acute Status    Post-Acute Authorization Placement     Post-Acute Placement Status Set-up Complete/Auth obtained     Discharge Delays None known at this time                   Pt to discharge to [facility] SNF today. RN provided with number for report: [phone], ETA: en route. Pt's family aware.      No DC needs.     Important Message from Medicare             Contact Info       Marcie Espinal MD   Specialty: Family Medicine   Relationship: PCP - General    [address] [place] [address]   Phone: [phone]       Next Steps: Follow up in 3 day(s)    Instructions: -hospital follow up    JUSTINO DelgadoP-C   Specialty: Cardiology    [address] [place] [address] [address] [place] [place] [address]   Phone: [phone]       Next Steps: Follow up in 1 week(s)    Instructions: hospital follow up    JOHN Lorenzo Jr, MD   Specialty: Orthopedic Surgery    [facility]  [address]  [address]  [place] [address]   Phone: [phone]       Next Steps: Follow up    Instructions: -, As needed, If symptoms worsen- hospital follow up  Pt may see Taylor Walker for further evaluation

## 2022-11-22 NOTE — ASSESSMENT & PLAN NOTE
Status post mechanical fall   Patient denied palpitations, dizziness/loss of consciousness prior to the episode;  On arrival- left occipital region of head has a small laceration; Lacerations addressed in ED;  CT head:    Left parietal scalp contusion with small hematoma.  Negative for skull fracture.  No acute intracranial hemorrhage.  CT cervical spine: no CT evidence of acute posttraumatic injury osseous cervical spine.  CT C/ A/P: Multiple thoracolumbar compression fracture deformities including T7, T10, and L2, as detailed above, with age-indeterminate T10 fracture.  There is retropulsion associated with the T10 and L2 fractures with subsequent spinal canal stenosis;  ED physician stated that he initiated transfer process to [place] given anticipated need for neurosurgical intervention.  Stated that he discussed with neurosurgeon from [place], who stated that given patient's age, chronic findings of the CT scan, no need for neurosurgical intervention at this point, provide comfort measures including pain management, bracing.  Neurovascularly intact on physical examination  Pain management, arrangements for bracing, follow up with Neurosurgery at [facility]- no surgical intervention recommended and bracing needed in the presence of back pain   PT/OT evaluation ordered   CPK negative   -Neurosurgery consulted with imaging reviewed and recommendations received   -PT/OT- evaluation completed with SNF placement recommended- pt accepted to [facility] with authorization pending

## 2022-11-22 NOTE — PLAN OF CARE
Continue OT POC.  CGA for bed mobility. Min A for t/fs and ambulation 30ft with RW. Mod A for lateral scoot.

## 2022-11-22 NOTE — PLAN OF CARE
TX COMPLETED: facilitated bed mobility with min A, transfers with min A, ambulated min A with RW 10ft x 2. Recommend SNF

## 2022-11-22 NOTE — PT/OT/SLP PROGRESS
"Physical Therapy  Treatment    Francie Olsen   MRN: [id number]   Admitting Diagnosis: Fall       PT Start Time: 1510     PT Stop Time: 1540    PT Total Time (min): 30 min       Billable Minutes:  Gait Training 15 and Therapeutic Exercise 15    Treatment Type: Treatment  PT/PTA: PT     PTA Visit Number: 0       General Precautions: Standard, fall (hard of hearing)  Orthopedic Precautions: N/A   Braces: N/A  Respiratory Status: Room air    Spiritual, Cultural Beliefs, Lutheran Practices, Values that Affect Care: no    Subjective:  Communicated with nurse prior to session.  "I noticed that you therapists always say "Let's" but you really mean "YOU"...I'm on to yall" pt agreeable to PT services      Pain/Comfort  Pain Rating 1: 5/10 (neck and back of head)  Pain Addressed 1: Reposition, Distraction    Objective:   Patient found with: peripheral IV    Functional Mobility:  Bed Mobility:    Facilitated bed mobility with min A with cues for rolling to sidelying prior to trunk movement to decrease pain to neck. Tolerated sitting OOB in chair x 5-7 mins.     Transfers:   Transfer training with RW and min A to achieve standing. Required manual assist to maintain forward balance.     Gait:    Gait training with RW min A 10ft x 2; demonstrates slow pace, flexed posture, wide KANG decreased stride length with assist needed for RW management.      Treatment and Education:  Educated on seated BLE exercises to promote strength and joint mobility. Exercises included AP, LAQ, marching x 10 reps.      AM-PAC 6 CLICK MOBILITY  How much help from another person does this patient currently need?   1 = Unable, Total/Dependent Assistance  2 = A lot, Maximum/Moderate Assistance  3 = A little, Minimum/Contact Guard/Supervision  4 = None, Modified [place]/Independent    Turning over in bed (including adjusting bedclothes, sheets and blankets)?: 3  Sitting down on and standing up from a chair with arms (e.g., wheelchair, bedside commode, " etc.): 3  Moving from lying on back to sitting on the side of the bed?: 3  Moving to and from a bed to a chair (including a wheelchair)?: 3  Need to walk in hospital room?: 3  Climbing 3-5 steps with a railing?: 1  Basic Mobility Total Score: 16    AM-PAC Raw Score CMS G-Code Modifier Level of Impairment Assistance   6 % Total / Unable   7 - 9 CM 80 - 100% Maximal Assist   10 - 14 CL 60 - 80% Moderate Assist   15 - 19 CK 40 - 60% Moderate Assist   20 - 22 CJ 20 - 40% Minimal Assist   23 CI 1-20% SBA / CGA   24 CH 0% Independent/ Mod I     Patient left  supine with head of bed elevated and towel roll under neck for comfort  (pt request) with all lines intact, call button in reach, bed alarm on, and nursing notified.    Assessment:  Francie Olsen is a 89 y.o. female with a medical diagnosis of Fall and presents with the impairments listed below. Pt demonstrated eagerness to participate despite pain. Pt will benefit from continued PT services to address impairments and progress toward baseline.    Rehab identified problem list/impairments: Rehab identified problem list/impairments: weakness, impaired endurance, impaired functional mobility, gait instability, impaired balance, impaired cognition, decreased safety awareness, pain, decreased lower extremity function    Rehab potential is good.    Activity tolerance: Good    Discharge recommendations: Discharge Facility/Level of Care Needs: nursing facility, skilled     Barriers to discharge:      Equipment recommendations:       GOALS:   Multidisciplinary Problems       Physical Therapy Goals          Problem: Physical Therapy    Goal Priority Disciplines Outcome Goal Variances Interventions   Physical Therapy Goal     PT, PT/OT Ongoing, Progressing     Description: Patient will be seen a minimal of 3 out of 7 days a week.     LTG to be met by 12/04/22:    Bed mob: Mod I  Transfers: Mod I with RW  Gait: Mod I with RW x30 feet                           PLAN:     Patient to be seen 3 x/week  to address the above listed problems via gait training, therapeutic activities, therapeutic exercises, neuromuscular re-education  Plan of Care expires: 12/04/22  Plan of Care reviewed with: patient         11/21/2022

## 2022-11-22 NOTE — PT/OT/SLP PROGRESS
Physical Therapy  Treatment    Francie Olsen   MRN: [id number]   Admitting Diagnosis: Fall    PT Received On: 11/22/22  PT Start Time: 1450     PT Stop Time: 1530    PT Total Time (min): 40 min       Billable Minutes:  Gait Training 10 and Therapeutic Activity 30    Treatment Type: Treatment  PT/PTA: PTA     PTA Visit Number: 1       General Precautions: Standard, fall  Orthopedic Precautions: N/A   Braces: N/A  Respiratory Status: Room air    Spiritual, Cultural Beliefs, Confucianist Practices, Values that Affect Care: no    Subjective:  Communicated with patient's nurse and completed Epic chart review prior to session.  Patient agreed to PT session.     Pain/Comfort  Pain Rating 1: 5/10  Location 1: back  Pain Addressed 1: Other (see comments) (ACTIVITY PACING)  Pain Rating Post-Intervention 1: 5/10    Objective:   Patient found with: peripheral IV, telemetry, PureWick    Supine > sit EOB: CGA    STS from EOB > RW: Min A (VC for hand placement)    30 ft w/ RW Min A (required assistance for: balance, safety w/ RW mgmt and to remain within KANG of AD; increased time to complete)    Stand pivot T/F to chair w/ RW: Min A (VC for safety w/ RW mgmt and sequencing of task)    Patient began to c/o feeling nausea. After several minutes, episode did not pass and patient requested to return to bed.     STS from EOB > RW: Min A (VC for hand placement)    Stand pivot T/F from chair > EOB  w/ RW: Min A (VC for safety w/ RW mgmt and sequencing of task)    Side stepping ~6 steps towards HOB (L side) w/ RW: Min A (VC for safety w/ RW mgmt and sequencing of task)    Seated lateral scoot x1 rep towards HOB (L side): Mod A     Sit > supine: CGA    Educated patient on importance of increased tolerance to upright position and direct impact on CV endurance and strength. Patient encouraged to utilize elevated HOB to simulate chair position until able to safely complete chair T/F. Patient given a minimum goal of majority of the day to be spent  in upright, especially with all meals. Encouraged patient to perform AROM TE to BLE throughout the day within all available planes of motion. Also encouraged patient to request assistance from nursing staff to T/F to chair for dinner time if nausea improves/resolves. Re enforced importance of utilizing call light to meet needs in room and not attempt to get up without staff assistance. Patient verbalized understanding and agreed to comply.     AM-PAC 6 CLICK MOBILITY  How much help from another person does this patient currently need?   1 = Unable, Total/Dependent Assistance  2 = A lot, Maximum/Moderate Assistance  3 = A little, Minimum/Contact Guard/Supervision  4 = None, Modified [place]/Independent    Turning over in bed (including adjusting bedclothes, sheets and blankets)?: 3  Sitting down on and standing up from a chair with arms (e.g., wheelchair, bedside commode, etc.): 3  Moving from lying on back to sitting on the side of the bed?: 3  Moving to and from a bed to a chair (including a wheelchair)?: 3  Need to walk in hospital room?: 3  Climbing 3-5 steps with a railing?: 1  Basic Mobility Total Score: 16    AM-PAC Raw Score CMS G-Code Modifier Level of Impairment Assistance   6 % Total / Unable   7 - 9 CM 80 - 100% Maximal Assist   10 - 14 CL 60 - 80% Moderate Assist   15 - 19 CK 40 - 60% Moderate Assist   20 - 22 CJ 20 - 40% Minimal Assist   23 CI 1-20% SBA / CGA   24 CH 0% Independent/ Mod I     Patient left with bed in chair position with call button in reach.    Assessment:  Francie Olsen is a 89 y.o. female with a medical diagnosis of Fall and presents with overall decline in functional mobility. Patient would continue to benefit from skilled PT to address functional limitations listed below in order to return to PLOF/decrease caregiver burden. Patient is highly motivated to progress towards goals and return to PLOF.    Rehab identified problem list/impairments: Rehab identified problem  list/impairments: weakness, impaired endurance, impaired self care skills, impaired functional mobility, gait instability, impaired balance, decreased coordination, decreased upper extremity function, decreased lower extremity function, pain, decreased ROM, impaired cardiopulmonary response to activity    Rehab potential is good.    Activity tolerance: Fair    Discharge recommendations: Discharge Facility/Level of Care Needs: nursing facility, skilled     Barriers to discharge:      Equipment recommendations: Equipment Needed After Discharge: other (see comments) (TBD BY NEXT LEVEL OF CARE)     GOALS:   Multidisciplinary Problems       Physical Therapy Goals          Problem: Physical Therapy    Goal Priority Disciplines Outcome Goal Variances Interventions   Physical Therapy Goal     PT, PT/OT Ongoing, Progressing     Description: Patient will be seen a minimal of 3 out of 7 days a week.     LTG to be met by 12/04/22:    Bed mob: Mod I  Transfers: Mod I with RW  Gait: Mod I with RW x30 feet                           PLAN:    Patient to be seen 3 x/week  to address the above listed problems via gait training, therapeutic activities, therapeutic exercises  Plan of Care expires: 12/04/22  Plan of Care reviewed with: patient         11/22/2022

## 2022-11-22 NOTE — PROGRESS NOTES
[facility] Medicine  Progress Note    Patient Name: Francie Olsen  MRN: [id number]  Patient Class: OP- Observation   Admission Date: 11/19/2022  Length of Stay: 0 days  Attending Physician: Mando Luna MD  Primary Care Provider: Marcie Espinal MD        Subjective:     Principal Problem:Fall        HPI:  Francie Olsen is a 89 y.o. female patient with a PMHx of HTN, HLD, hearing loss, squamous cell carcinoma, and osteoporosis who presents to the Emergency Department for evaluation of fall which occurred at 5 AM. Pt describes getting out of bed right before 5 AM, reaching to get her robe, falling over backwards, and hitting her head on the bathroom tile. Pt lives alone/ son and grand son lives next door. She denies being on blood thinners. She notes a fall similar to this occurred 1 year PTA, which resulted in 4 fractured vertebrae and an injured shoulder. EMS report pt being in afib between 50 and 180.  Symptoms are constant and moderate in severity. No mitigating or exacerbating factors reported. Associated sxs include neck pain, dizziness, and abd pain. Patient denies any CP, SOB, LOC, palpitations.     ED physician stated that he initiated transfer process to [place] given anticipated need for neurosurgical intervention.  Stated that he discussed with neurosurgeon from [place], who stated that given patient's age, chronic findings of the CT scan, no need for neurosurgical intervention at this point, provide comfort measures including pain management, bracing.    Stated that she ambulates with walker at home.    Patient alert and oriented x3,- code status DNR/DNI.  Also stated that she does not want acute interventions given her age.          Overview/Hospital Course:  Pt admitted to Observation s/p fall in the setting of SVT. Troponin elevated and Cardiology consulted. CT of head showed left parietal scalp contusion with small hematoma.  Negative for skull fracture and no acute  intracranial hemorrhage.  Age-appropriate advanced cerebral atrophy with advanced deep white matter microangiopathy sequela. Xray of shoulder and ribs show no acute fracture. CT of cervical spine showed no CT evidence of acute posttraumatic injury osseous cervical spine.  Multilevel advanced facet arthropathy with multilevel grade 1 spondylolisthesis.  Scattered cervical spondylosis. CT of chest, abdomen, and pelvis showed Multiple thoracolumbar compression fracture deformities including T7, T10, and L2 with age-indeterminate T10 fracture.  There is retropulsion associated with the T10 and L2 fractures with subsequent spinal canal stenosis. Remote healed fracture deformities left superior and inferior pubic rami.  Status post ORIF bilateral femurs.  The bones appear osteopenic. Right middle lobe and right lower lobe clustered nodular opacities with associated traction bronchiectasis likely post inflammatory). HM discussed case with Neurosurgery and no surgical intervention recommended and bracing recommended only in the presence of back pain. Spontaneous resolution of note with beta blocker initiated at low dose. Serial troponin results in progress. PT/OT evaluation in progress. On 11/21/22, PT/OT evaluation completed with SNF placement recommended. On 11/22/22, Pt accepted to [facility] with authorization pending. SW assisting with placement.       Interval History: pt in bed upon exam and continues to report left sided rib pain with pain medication adjusted. Pt accepted to [facility] for SNF placement with authorization pending.     Review of Systems   Constitutional:  Positive for activity change. Negative for appetite change, fatigue and fever.   HENT:  Negative for congestion, postnasal drip, sinus pressure, sore throat and trouble swallowing.    Respiratory:  Negative for cough, shortness of breath and wheezing.    Cardiovascular:  Negative for chest pain, palpitations and leg swelling.   Gastrointestinal:   Positive for abdominal pain. Negative for abdominal distention, nausea and vomiting.   Genitourinary:  Negative for difficulty urinating, dysuria, flank pain and urgency.   Musculoskeletal:  Positive for myalgias and neck pain. Negative for arthralgias and back pain.   Skin:  Negative for color change and wound.   Neurological:  Positive for dizziness (improved) and weakness. Negative for headaches.   Psychiatric/Behavioral:  Negative for agitation, confusion and sleep disturbance. The patient is not nervous/anxious.    Objective:     Vital Signs (Most Recent):  Temp: 97.9 °F (36.6 °C) (11/22/22 0751)  Pulse: (!) 57 (11/22/22 0752)  Resp: 16 (11/22/22 0829)  BP: (!) 162/71 (11/22/22 0752)  SpO2: 97 % (11/22/22 0752)   Vital Signs (24h Range):  Temp:  [97.4 °F (36.3 °C)-97.9 °F (36.6 °C)] 97.9 °F (36.6 °C)  Pulse:  [56-72] 57  Resp:  [16-17] 16  SpO2:  [91 %-99 %] 97 %  BP: (117-179)/(60-76) 162/71     Weight: 50.2 kg (110 lb 10.7 oz)  Body mass index is 19.6 kg/m².    Intake/Output Summary (Last 24 hours) at 11/22/2022 1122  Last data filed at 11/21/2022 1809  Gross per 24 hour   Intake 240 ml   Output 200 ml   Net 40 ml      Physical Exam  HENT:      Right Ear: Decreased hearing noted.      Left Ear: Decreased hearing noted.      Nose: Nose normal.      Mouth/Throat:      Mouth: Mucous membranes are dry.      Pharynx: Oropharynx is clear.   Cardiovascular:      Rate and Rhythm: Regular rhythm. Bradycardia present.      Pulses: Normal pulses.      Heart sounds: Normal heart sounds.   Pulmonary:      Effort: Pulmonary effort is normal.      Breath sounds: Normal breath sounds.   Abdominal:      General: Bowel sounds are normal. There is no distension.      Palpations: Abdomen is soft.      Tenderness: There is no abdominal tenderness.   Genitourinary:     Comments: Deferred   Musculoskeletal:         General: Normal range of motion.      Cervical back: Normal range of motion.      Comments: Rib tenderness reported on  L side    Skin:     General: Skin is warm and dry.   Neurological:      Mental Status: She is alert and oriented to person, place, and time.   Psychiatric:         Mood and Affect: Mood normal.         Behavior: Behavior normal.       Significant Labs: All pertinent labs within the past 24 hours have been reviewed.  CBC:   Recent Labs   Lab 11/20/22  1223 11/21/22  1205   WBC 8.37 7.65   HGB 11.3* 11.9*   HCT 36.2* 38.1    223     CMP:   Recent Labs   Lab 11/20/22  1223 11/21/22  1205    138   K 3.5 3.4*    102   CO2 20* 24   * 105   BUN 16 15   CREATININE 0.7 0.6   CALCIUM 9.2 9.0   ANIONGAP 14 12       Significant Imaging: I have reviewed all pertinent imaging results/findings within the past 24 hours.      Assessment/Plan:      * Fall  Status post mechanical fall   Patient denied palpitations, dizziness/loss of consciousness prior to the episode;  On arrival- left occipital region of head has a small laceration; Lacerations addressed in ED;  CT head:    Left parietal scalp contusion with small hematoma.  Negative for skull fracture.  No acute intracranial hemorrhage.  CT cervical spine: no CT evidence of acute posttraumatic injury osseous cervical spine.  CT C/ A/P: Multiple thoracolumbar compression fracture deformities including T7, T10, and L2, as detailed above, with age-indeterminate T10 fracture.  There is retropulsion associated with the T10 and L2 fractures with subsequent spinal canal stenosis;  ED physician stated that he initiated transfer process to [place] given anticipated need for neurosurgical intervention.  Stated that he discussed with neurosurgeon from [place], who stated that given patient's age, chronic findings of the CT scan, no need for neurosurgical intervention at this point, provide comfort measures including pain management, bracing.  Neurovascularly intact on physical examination  Pain management, arrangements for bracing, follow up with Neurosurgery  at [facility]- no surgical intervention recommended and bracing needed in the presence of back pain   PT/OT evaluation ordered   CPK negative   -Neurosurgery consulted with imaging reviewed and recommendations received   -PT/OT- evaluation completed with SNF placement recommended- pt accepted to [facility] with authorization pending       Elevated troponin  0.154>0.071>0.031  -due to demand in the setting of SVT- converted to NSR  -downward trend noted   -recommendations per cardiology     Compression fracture of body of thoracic vertebra  Nonsurgical management  Pain management, bracing  PT/OT  -11/20/22-  chronic appearing L2, T12, T11, and T10 compression fractures.  There is some retropulsion at L2 with moderate central canal stenosis per Neurosurgery with brace recommended in the setting of back pain  - analgesia as needed   -review of imaging per Neurosurgery appears fractures are chronic in nature       SVT (supraventricular tachycardia)  Patient denied any history of arrhythmias, heart failure, coronary artery disease.    As per EMS report noted to have AFib  In ED noted to be in SVTs with intermittent runs of AFib with transition to sinus rhythm;  Blood pressure is stable  EKG, tele monitoring, cardio follow-up  -11/20/22- NSR on monitor- Cardiology following with low dose BB initiated  -elevated troponin likely due to demand trending down   -current plan of care continued         VTE Risk Mitigation (From admission, onward)         Ordered     IP VTE HIGH RISK PATIENT  Once         11/19/22 1713     Place sequential compression device  Until discontinued         11/19/22 1713     Place sequential compression device  Until discontinued         11/19/22 1443                Discharge Planning   JULIANA:      Code Status: Full Code   Is the patient medically ready for discharge?:     Reason for patient still in hospital (select all that apply): Patient trending condition, Treatment and Pending disposition  Discharge  Plan A: Skilled Nursing Facility   Discharge Delays: None known at this time              Fay Darby NP  Department of Hospital Medicine   [facility] - Telemetry ([facility])

## 2022-11-22 NOTE — CONSULTS
[facility] - Telemetry ([facility])  Wound Care    Patient Name:  Francie Olsen   MRN:  [id number]  Date: 11/21/2022  Diagnosis: Fall    History:     Past Medical History:   Diagnosis Date    Acute blood loss anemia 11/24/2021    Chronic low back pain     Chronic mid back pain     Familial tremor     Hearing loss     Hyperlipemia     Hypertension     Osteoarthritis     Osteoporosis, unspecified     SCC (squamous cell carcinoma), hand 2015    hand    Squamous cell carcinoma 9-10yrs ago    left ear       Social History     Socioeconomic History    Marital status:    Tobacco Use    Smoking status: Former    Smokeless tobacco: Never   Substance and Sexual Activity    Alcohol use: No     Alcohol/week: 0.0 standard drinks    Drug use: No    Sexual activity: Never     Birth control/protection: Post-menopausal   Other Topics Concern    Are you pregnant or think you may be? No    Breast-feeding No       Precautions:     Allergies as of 11/19/2022 - Reviewed 11/19/2022   Allergen Reaction Noted    Codeine Nausea And Vomiting and Nausea Only 01/20/1927    Meperidine Nausea And Vomiting and Nausea Only 01/20/1927       WO Assessment Details/Treatment     89 y.o. female patient with a PMHx of HTN, HLD, hearing loss, squamous cell carcinoma, and osteoporosis. Consulted for altered skin integrity to sacrum. Patient turned to right side with assist. Dime sized area to her upper sacrum that is maroon/purple intact. Appears to be a bruise, not a DTPI but will follow for changes. Right mid back also red but appears to be a rash. Unknown etiology. Will follow.     11/21/2022

## 2022-11-22 NOTE — NURSING
Pt remains free of falls/injury. Safety precautions maintained.   VSS. No S/S of distress noted at this time.  IV dc'd.  Pt picked up by [facility] staff.

## 2022-11-23 ENCOUNTER — TELEPHONE (OUTPATIENT)
Dept: CARDIOLOGY | Facility: CLINIC | Age: 87
End: 2022-11-23
Payer: MEDICARE

## 2022-11-23 NOTE — TELEPHONE ENCOUNTER
----- Message from SOLANGE Martinez sent at 11/23/2022  2:42 PM CST -----  Patient d/c'ed yesterday to SNF.     Arrange hospital follow up with any provider in 2 weeks.     Thanks  ----- Message -----  From: Carito Michel MA  Sent: 11/23/2022   9:16 AM CST  To: SOLANGE Martinez    Can we schedule this patient with us?   ----- Message -----  From: Shelby Mejia  Sent: 11/23/2022   9:11 AM CST  To: Keiry Jauregui with the [facility] stated the pt need to schedule a hospital follow up for chest pains. Call back number is [phone]. [email or web address]

## 2022-12-06 NOTE — PROGRESS NOTES
Subjective:   Patient ID:  Francie Olsen is a 89 y.o. female who presents for evaluation of SVT (supraventricular tachycardia)      HPI    Francie Olsen is a 89 year old female who presents to Arrhythmia clinic for hospital follow up. Her current medical conditions include SVT, Anemia, HTN, HLP, near syncope. She returns today and states she is doing ok.     Still feels very weak and endorses lightheadedness with position changes.     Denies chest pain or anginal equivalents. No shortness of breath, FRANK or palpitations. Denies orthopnea, PND or abdominal bloating. Reports regular walking without any issues lately. NO leg swelling or claudications. No recent falls, syncope or near syncopal events. Reports compliance with medications and dietary restrictions. NO CNS complaints to suggest TIA or CVA today. No signs of abnormal bleeding.       Past Medical History:   Diagnosis Date    Acute blood loss anemia 2021    Chronic low back pain     Chronic mid back pain     Familial tremor     Hearing loss     Hyperlipemia     Hypertension     Osteoarthritis     Osteoporosis, unspecified     SCC (squamous cell carcinoma), hand 2015    hand    Squamous cell carcinoma 9-10yrs ago    left ear       Past Surgical History:   Procedure Laterality Date    CATARACT EXTRACTION      FEMUR FRACTURE SURGERY Right     HIP FRACTURE SURGERY Left aprox 2010    implant for nerve pain      YAG OU         Social History     Tobacco Use    Smoking status: Former    Smokeless tobacco: Never   Substance Use Topics    Alcohol use: No     Alcohol/week: 0.0 standard drinks    Drug use: No       Family History   Problem Relation Age of Onset    Cancer Mother          54 uterine    Diabetes Son     Blindness Neg Hx     Cataracts Neg Hx     Glaucoma Neg Hx     Hypertension Neg Hx     Macular degeneration Neg Hx     Retinal detachment Neg Hx     Strabismus Neg Hx     Stroke Neg Hx     Thyroid disease Neg Hx     Melanoma Neg Hx      Wt  Readings from Last 3 Encounters:   12/07/22 42.3 kg (93 lb 4.1 oz)   11/22/22 50.2 kg (110 lb 10.7 oz)   01/12/22 46.3 kg (102 lb)     Temp Readings from Last 3 Encounters:   11/22/22 98.2 °F (36.8 °C) (Oral)   11/30/21 97.9 °F (36.6 °C) (Oral)   11/24/21 98.8 °F (37.1 °C) (Temporal)     BP Readings from Last 3 Encounters:   12/07/22 (!) 100/58   11/22/22 (!) 111/53   01/12/22 (!) 143/77     Pulse Readings from Last 3 Encounters:   12/07/22 60   11/22/22 64   01/12/22 74     Current Outpatient Medications on File Prior to Visit   Medication Sig Dispense Refill    docusate sodium (COLACE) 100 MG capsule Take 1 capsule (100 mg total) by mouth once daily. 30 capsule 0    melatonin 5 mg Cap Take by mouth.      metoprolol tartrate (LOPRESSOR) 25 MG tablet Take 1 tablet (25 mg total) by mouth 2 (two) times daily. 60 tablet 0    naloxegoL (MOVANTIK) 25 mg tablet Take 1 tablet by mouth once a day as needed for pain 30 tablet 3    oxyCODONE (ROXICODONE) 15 MG Tab Take 1 tablet by mouth twice a day as needed for pain 60 tablet 0     No current facility-administered medications on file prior to visit.       Review of Systems   Constitutional: Positive for malaise/fatigue.   HENT:  Negative for hearing loss and hoarse voice.    Eyes:  Negative for visual disturbance.   Cardiovascular:  Negative for chest pain, claudication, dyspnea on exertion, irregular heartbeat, leg swelling, near-syncope, orthopnea, palpitations, paroxysmal nocturnal dyspnea and syncope.   Respiratory:  Negative for cough, hemoptysis, shortness of breath, sleep disturbances due to breathing, snoring and wheezing.    Endocrine: Negative for cold intolerance and heat intolerance.   Hematologic/Lymphatic: Does not bruise/bleed easily.   Skin:  Negative for color change, dry skin and nail changes.   Musculoskeletal:  Positive for arthritis and back pain. Negative for joint pain and myalgias.   Gastrointestinal:  Negative for bloating, abdominal pain,  "constipation, nausea and vomiting.   Genitourinary:  Negative for dysuria, flank pain, hematuria and hesitancy.   Neurological:  Positive for light-headedness. Negative for headaches, loss of balance, numbness, paresthesias and weakness.   Psychiatric/Behavioral:  Negative for altered mental status.    Allergic/Immunologic: Positive for environmental allergies.     Objective:BP (!) 100/58 (BP Location: Right arm, Patient Position: Sitting)   Pulse 60   Ht 5' 3" (1.6 m)   Wt 42.3 kg (93 lb 4.1 oz)   BMI 16.52 kg/m²      Physical Exam  Vitals and nursing note reviewed.   Constitutional:       General: She is not in acute distress.     Appearance: Normal appearance. She is well-developed. She is not ill-appearing.   HENT:      Head: Normocephalic and atraumatic.      Nose: Nose normal.      Mouth/Throat:      Mouth: Mucous membranes are moist.   Eyes:      Pupils: Pupils are equal, round, and reactive to light.   Neck:      Thyroid: No thyromegaly.      Vascular: No JVD.      Trachea: No tracheal deviation.   Cardiovascular:      Rate and Rhythm: Normal rate and regular rhythm. Occasional Extrasystoles are present.     Chest Wall: PMI is not displaced.      Pulses: Intact distal pulses.           Radial pulses are 2+ on the right side and 2+ on the left side.        Dorsalis pedis pulses are 2+ on the right side and 2+ on the left side.      Heart sounds: S1 normal and S2 normal. Heart sounds not distant. No murmur heard.  Pulmonary:      Effort: Pulmonary effort is normal. No respiratory distress.      Breath sounds: Normal breath sounds. No wheezing.   Abdominal:      General: Bowel sounds are normal. There is no distension.      Palpations: Abdomen is soft.      Tenderness: There is no abdominal tenderness.   Musculoskeletal:         General: No swelling. Normal range of motion.      Cervical back: Full passive range of motion without pain, normal range of motion and neck supple.      Right lower leg: No edema. "      Left lower leg: No edema.      Right ankle: No swelling.      Left ankle: No swelling.   Skin:     General: Skin is warm and dry.      Capillary Refill: Capillary refill takes less than 2 seconds.      Nails: There is no clubbing.   Neurological:      General: No focal deficit present.      Mental Status: She is alert and oriented to person, place, and time.      Motor: Weakness present.   Psychiatric:         Speech: Speech normal.         Behavior: Behavior normal.         Thought Content: Thought content normal.         Judgment: Judgment normal.       Lab Results   Component Value Date    CHOL 149 08/10/2015    CHOL 151 02/10/2014    CHOL 154 02/06/2013     Lab Results   Component Value Date    HDL 66 08/10/2015    HDL 68 02/10/2014    HDL 69 02/06/2013     Lab Results   Component Value Date    LDLCALC 62.0 (L) 08/10/2015    LDLCALC 68.6 02/10/2014    LDLCALC 72.0 02/06/2013     Lab Results   Component Value Date    TRIG 105 08/10/2015    TRIG 72 02/10/2014    TRIG 65 02/06/2013     Lab Results   Component Value Date    CHOLHDL 44.3 08/10/2015    CHOLHDL 45.0 02/10/2014    CHOLHDL 44.8 02/06/2013       Chemistry        Component Value Date/Time     11/21/2022 1205    K 3.4 (L) 11/21/2022 1205     11/21/2022 1205    CO2 24 11/21/2022 1205    BUN 15 11/21/2022 1205    CREATININE 0.6 11/21/2022 1205     11/21/2022 1205        Component Value Date/Time    CALCIUM 9.0 11/21/2022 1205    ALKPHOS 77 11/19/2022 0759    AST 18 11/19/2022 0759    ALT 13 11/19/2022 0759    BILITOT 0.5 11/19/2022 0759    ESTGFRAFRICA >60 11/30/2021 0743    EGFRNONAA >60 11/30/2021 0743          Lab Results   Component Value Date    TSH 0.782 02/13/2012     Lab Results   Component Value Date    INR 1.0 05/17/2016    INR 1.0 06/09/2004     Lab Results   Component Value Date    WBC 7.65 11/21/2022    HGB 11.9 (L) 11/21/2022    HCT 38.1 11/21/2022    MCV 87 11/21/2022     11/21/2022          Assessment:      1.  Orthostatic hypotension    2. Mixed hyperlipidemia    3. Tortuous aorta    4. SVT (supraventricular tachycardia)    5. Postural dizziness with near syncope    6. Debility    7. Fall, subsequent encounter        Plan:     Add Midodrine 2.5 mg TID  Change Positions slowly  Orthostatic VS   Schuyler salt intake  RTC in 1 months    Nicole May, FNP-C [facility]

## 2022-12-07 ENCOUNTER — OFFICE VISIT (OUTPATIENT)
Dept: CARDIOLOGY | Facility: CLINIC | Age: 87
End: 2022-12-07
Payer: MEDICARE

## 2022-12-07 VITALS
SYSTOLIC BLOOD PRESSURE: 100 MMHG | DIASTOLIC BLOOD PRESSURE: 58 MMHG | WEIGHT: 93.25 LBS | HEIGHT: 63 IN | HEART RATE: 60 BPM | BODY MASS INDEX: 16.52 KG/M2

## 2022-12-07 DIAGNOSIS — I95.1 ORTHOSTATIC HYPOTENSION: Primary | ICD-10-CM

## 2022-12-07 DIAGNOSIS — R42 POSTURAL DIZZINESS WITH NEAR SYNCOPE: ICD-10-CM

## 2022-12-07 DIAGNOSIS — I47.10 SVT (SUPRAVENTRICULAR TACHYCARDIA): ICD-10-CM
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
## 2022-12-07 DIAGNOSIS — E78.2 MIXED HYPERLIPIDEMIA: ICD-10-CM

## 2022-12-07 DIAGNOSIS — R53.81 DEBILITY: ICD-10-CM

## 2022-12-07 DIAGNOSIS — I95.1 ORTHOSTATIC HYPOTENSION: ICD-10-CM

## 2022-12-07 DIAGNOSIS — R55 POSTURAL DIZZINESS WITH NEAR SYNCOPE: ICD-10-CM

## 2022-12-07 DIAGNOSIS — I77.1 TORTUOUS AORTA: ICD-10-CM

## 2022-12-07 PROCEDURE — 1100F PR PT FALLS ASSESS DOC 2+ FALLS/FALL W/INJURY/YR: ICD-10-PCS | Mod: HCNC,CPTII,S$GLB, | Performed by: NURSE PRACTITIONER

## 2022-12-07 PROCEDURE — 3288F PR FALLS RISK ASSESSMENT DOCUMENTED: ICD-10-PCS | Mod: HCNC,CPTII,S$GLB, | Performed by: NURSE PRACTITIONER

## 2022-12-07 PROCEDURE — 1100F PTFALLS ASSESS-DOCD GE2>/YR: CPT | Mod: HCNC,CPTII,S$GLB, | Performed by: NURSE PRACTITIONER

## 2022-12-07 PROCEDURE — 99999 PR PBB SHADOW E&M-EST. PATIENT-LVL III: CPT | Mod: PBBFAC,HCNC,, | Performed by: NURSE PRACTITIONER

## 2022-12-07 PROCEDURE — 99999 PR PBB SHADOW E&M-EST. PATIENT-LVL III: ICD-10-PCS | Mod: PBBFAC,HCNC,, | Performed by: NURSE PRACTITIONER

## 2022-12-07 PROCEDURE — 99204 PR OFFICE/OUTPT VISIT, NEW, LEVL IV, 45-59 MIN: ICD-10-PCS | Mod: HCNC,S$GLB,, | Performed by: NURSE PRACTITIONER

## 2022-12-07 PROCEDURE — 1125F PR PAIN SEVERITY QUANTIFIED, PAIN PRESENT: ICD-10-PCS | Mod: HCNC,CPTII,S$GLB, | Performed by: NURSE PRACTITIONER

## 2022-12-07 PROCEDURE — 99499 UNLISTED E&M SERVICE: CPT | Mod: S$GLB,,, | Performed by: NURSE PRACTITIONER

## 2022-12-07 PROCEDURE — 1159F MED LIST DOCD IN RCRD: CPT | Mod: HCNC,CPTII,S$GLB, | Performed by: NURSE PRACTITIONER

## 2022-12-07 PROCEDURE — 1159F PR MEDICATION LIST DOCUMENTED IN MEDICAL RECORD: ICD-10-PCS | Mod: HCNC,CPTII,S$GLB, | Performed by: NURSE PRACTITIONER

## 2022-12-07 PROCEDURE — 1125F AMNT PAIN NOTED PAIN PRSNT: CPT | Mod: HCNC,CPTII,S$GLB, | Performed by: NURSE PRACTITIONER

## 2022-12-07 PROCEDURE — 99499 RISK ADDL DX/OHS AUDIT: ICD-10-PCS | Mod: S$GLB,,, | Performed by: NURSE PRACTITIONER

## 2022-12-07 PROCEDURE — 99204 OFFICE O/P NEW MOD 45 MIN: CPT | Mod: HCNC,S$GLB,, | Performed by: NURSE PRACTITIONER

## 2022-12-07 PROCEDURE — 3288F FALL RISK ASSESSMENT DOCD: CPT | Mod: HCNC,CPTII,S$GLB, | Performed by: NURSE PRACTITIONER

## 2022-12-07 RX ORDER — MIDODRINE HYDROCHLORIDE 2.5 MG/1
2.5 TABLET ORAL 3 TIMES DAILY
Qty: 90 TABLET | Refills: 11 | Status: SHIPPED | OUTPATIENT
Start: 2022-12-07 | End: 2023-01-09

## 2022-12-07 RX ORDER — MIDODRINE HYDROCHLORIDE 2.5 MG/1
2.5 TABLET ORAL 3 TIMES DAILY
Qty: 90 TABLET | Refills: 11 | Status: SHIPPED | OUTPATIENT
Start: 2022-12-07 | End: 2022-12-07 | Stop reason: SDUPTHER

## 2022-12-07 RX ORDER — ACETAMINOPHEN 500 MG
5 TABLET ORAL NIGHTLY PRN
COMMUNITY

## 2022-12-19 ENCOUNTER — PATIENT MESSAGE (OUTPATIENT)
Dept: PHARMACY | Facility: CLINIC | Age: 87
End: 2022-12-19
Payer: MEDICARE

## 2022-12-29 ENCOUNTER — PATIENT OUTREACH (OUTPATIENT)
Dept: ADMINISTRATIVE | Facility: HOSPITAL | Age: 87
End: 2022-12-29
Payer: MEDICARE

## 2023-01-12 ENCOUNTER — TELEPHONE (OUTPATIENT)
Dept: ADMINISTRATIVE | Facility: HOSPITAL | Age: 88
End: 2023-01-12
Payer: MEDICARE

## 2023-01-21 ENCOUNTER — HOSPITAL ENCOUNTER (OUTPATIENT)
Facility: HOSPITAL | Age: 88
LOS: 2 days | Discharge: HOSPICE | End: 2023-01-23
Attending: EMERGENCY MEDICINE | Admitting: HOSPITALIST
Payer: MEDICARE

## 2023-01-21 DIAGNOSIS — R53.1 GENERALIZED WEAKNESS: ICD-10-CM

## 2023-01-21 DIAGNOSIS — E87.6 HYPOKALEMIA: ICD-10-CM

## 2023-01-21 DIAGNOSIS — R07.9 CHEST PAIN: ICD-10-CM

## 2023-01-21 DIAGNOSIS — R65.10 SIRS (SYSTEMIC INFLAMMATORY RESPONSE SYNDROME): Primary | ICD-10-CM

## 2023-01-21 DIAGNOSIS — R53.1 WEAKNESS: ICD-10-CM

## 2023-01-21 DIAGNOSIS — R00.0 TACHYCARDIA: ICD-10-CM

## 2023-01-21 DIAGNOSIS — D72.829 LEUKOCYTOSIS, UNSPECIFIED TYPE: ICD-10-CM

## 2023-01-21 DIAGNOSIS — R93.2 ABNORMAL CT SCAN, GALLBLADDER: ICD-10-CM

## 2023-01-21 LAB
ALBUMIN SERPL BCP-MCNC: 3.4 G/DL (ref 3.5–5.2)
ALP SERPL-CCNC: 63 U/L (ref 55–135)
ALT SERPL W/O P-5'-P-CCNC: 11 U/L (ref 10–44)
ANION GAP SERPL CALC-SCNC: 13 MMOL/L (ref 8–16)
AST SERPL-CCNC: 16 U/L (ref 10–40)
BASOPHILS # BLD AUTO: 0.04 K/UL (ref 0–0.2)
BASOPHILS NFR BLD: 0.3 % (ref 0–1.9)
BILIRUB SERPL-MCNC: 1 MG/DL (ref 0.1–1)
BILIRUB UR QL STRIP: NEGATIVE
BNP SERPL-MCNC: 210 PG/ML (ref 0–99)
BUN SERPL-MCNC: 10 MG/DL (ref 10–30)
CALCIUM SERPL-MCNC: 8.7 MG/DL (ref 8.7–10.5)
CHLORIDE SERPL-SCNC: 102 MMOL/L (ref 95–110)
CLARITY UR: CLEAR
CO2 SERPL-SCNC: 21 MMOL/L (ref 23–29)
COLOR UR: YELLOW
CREAT SERPL-MCNC: 0.6 MG/DL (ref 0.5–1.4)
DIFFERENTIAL METHOD: ABNORMAL
EOSINOPHIL # BLD AUTO: 0 K/UL (ref 0–0.5)
EOSINOPHIL NFR BLD: 0 % (ref 0–8)
ERYTHROCYTE [DISTWIDTH] IN BLOOD BY AUTOMATED COUNT: 16.5 % (ref 11.5–14.5)
EST. GFR  (NO RACE VARIABLE): >60 ML/MIN/1.73 M^2
GLUCOSE SERPL-MCNC: 119 MG/DL (ref 70–110)
GLUCOSE UR QL STRIP: NEGATIVE
HCT VFR BLD AUTO: 34.9 % (ref 37–48.5)
HGB BLD-MCNC: 11.4 G/DL (ref 12–16)
HGB UR QL STRIP: NEGATIVE
IMM GRANULOCYTES # BLD AUTO: 0.08 K/UL (ref 0–0.04)
IMM GRANULOCYTES NFR BLD AUTO: 0.5 % (ref 0–0.5)
INFLUENZA A, MOLECULAR: NEGATIVE
INFLUENZA B, MOLECULAR: NEGATIVE
KETONES UR QL STRIP: ABNORMAL
LACTATE SERPL-SCNC: 1.4 MMOL/L (ref 0.5–2.2)
LACTATE SERPL-SCNC: 2.1 MMOL/L (ref 0.5–2.2)
LEUKOCYTE ESTERASE UR QL STRIP: NEGATIVE
LYMPHOCYTES # BLD AUTO: 0.4 K/UL (ref 1–4.8)
LYMPHOCYTES NFR BLD: 2.8 % (ref 18–48)
MCH RBC QN AUTO: 27.4 PG (ref 27–31)
MCHC RBC AUTO-ENTMCNC: 32.7 G/DL (ref 32–36)
MCV RBC AUTO: 84 FL (ref 82–98)
MONOCYTES # BLD AUTO: 0.6 K/UL (ref 0.3–1)
MONOCYTES NFR BLD: 3.8 % (ref 4–15)
NEUTROPHILS # BLD AUTO: 13.7 K/UL (ref 1.8–7.7)
NEUTROPHILS NFR BLD: 92.6 % (ref 38–73)
NITRITE UR QL STRIP: NEGATIVE
NRBC BLD-RTO: 0 /100 WBC
PH UR STRIP: 7 [PH] (ref 5–8)
PLATELET # BLD AUTO: 249 K/UL (ref 150–450)
PMV BLD AUTO: 10.3 FL (ref 9.2–12.9)
POTASSIUM SERPL-SCNC: 3.3 MMOL/L (ref 3.5–5.1)
PROCALCITONIN SERPL IA-MCNC: 1.61 NG/ML
PROT SERPL-MCNC: 7 G/DL (ref 6–8.4)
PROT UR QL STRIP: NEGATIVE
RBC # BLD AUTO: 4.16 M/UL (ref 4–5.4)
SODIUM SERPL-SCNC: 136 MMOL/L (ref 136–145)
SP GR UR STRIP: 1.01 (ref 1–1.03)
SPECIMEN SOURCE: NORMAL
TROPONIN I SERPL DL<=0.01 NG/ML-MCNC: 0.02 NG/ML (ref 0–0.03)
URN SPEC COLLECT METH UR: ABNORMAL
UROBILINOGEN UR STRIP-ACNC: NEGATIVE EU/DL
WBC # BLD AUTO: 14.8 K/UL (ref 3.9–12.7)

## 2023-01-21 PROCEDURE — 25000003 PHARM REV CODE 250: Mod: HCNC | Performed by: NURSE PRACTITIONER

## 2023-01-21 PROCEDURE — 93005 ELECTROCARDIOGRAM TRACING: CPT | Mod: HCNC

## 2023-01-21 PROCEDURE — 80053 COMPREHEN METABOLIC PANEL: CPT | Mod: HCNC | Performed by: REGISTERED NURSE

## 2023-01-21 PROCEDURE — 25500020 PHARM REV CODE 255: Mod: HCNC | Performed by: EMERGENCY MEDICINE

## 2023-01-21 PROCEDURE — 11000001 HC ACUTE MED/SURG PRIVATE ROOM: Mod: HCNC

## 2023-01-21 PROCEDURE — 63600175 PHARM REV CODE 636 W HCPCS: Mod: HCNC | Performed by: EMERGENCY MEDICINE

## 2023-01-21 PROCEDURE — 96365 THER/PROPH/DIAG IV INF INIT: CPT | Mod: HCNC,59

## 2023-01-21 PROCEDURE — 84484 ASSAY OF TROPONIN QUANT: CPT | Mod: HCNC | Performed by: REGISTERED NURSE

## 2023-01-21 PROCEDURE — 87040 BLOOD CULTURE FOR BACTERIA: CPT | Mod: 59,HCNC | Performed by: REGISTERED NURSE

## 2023-01-21 PROCEDURE — 84145 PROCALCITONIN (PCT): CPT | Mod: HCNC | Performed by: REGISTERED NURSE

## 2023-01-21 PROCEDURE — 83605 ASSAY OF LACTIC ACID: CPT | Mod: HCNC | Performed by: REGISTERED NURSE

## 2023-01-21 PROCEDURE — 25000003 PHARM REV CODE 250: Mod: HCNC | Performed by: EMERGENCY MEDICINE

## 2023-01-21 PROCEDURE — 93010 ELECTROCARDIOGRAM REPORT: CPT | Mod: HCNC,,, | Performed by: INTERNAL MEDICINE

## 2023-01-21 PROCEDURE — 93010 ELECTROCARDIOGRAM REPORT: CPT | Mod: HCNC,76,, | Performed by: INTERNAL MEDICINE

## 2023-01-21 PROCEDURE — 96361 HYDRATE IV INFUSION ADD-ON: CPT | Mod: HCNC

## 2023-01-21 PROCEDURE — 85025 COMPLETE CBC W/AUTO DIFF WBC: CPT | Mod: HCNC | Performed by: REGISTERED NURSE

## 2023-01-21 PROCEDURE — 83880 ASSAY OF NATRIURETIC PEPTIDE: CPT | Mod: HCNC | Performed by: REGISTERED NURSE

## 2023-01-21 PROCEDURE — 93010 EKG 12-LEAD: ICD-10-PCS | Mod: HCNC,,, | Performed by: INTERNAL MEDICINE

## 2023-01-21 PROCEDURE — 87502 INFLUENZA DNA AMP PROBE: CPT | Mod: HCNC | Performed by: REGISTERED NURSE

## 2023-01-21 PROCEDURE — 81003 URINALYSIS AUTO W/O SCOPE: CPT | Mod: HCNC | Performed by: REGISTERED NURSE

## 2023-01-21 PROCEDURE — 99291 CRITICAL CARE FIRST HOUR: CPT | Mod: 25,HCNC

## 2023-01-21 PROCEDURE — 63600175 PHARM REV CODE 636 W HCPCS: Mod: TB,JG,HCNC | Performed by: EMERGENCY MEDICINE

## 2023-01-21 PROCEDURE — 96375 TX/PRO/DX INJ NEW DRUG ADDON: CPT | Mod: HCNC

## 2023-01-21 RX ORDER — ACETAMINOPHEN 325 MG/1
650 TABLET ORAL EVERY 4 HOURS PRN
Status: DISCONTINUED | OUTPATIENT
Start: 2023-01-21 | End: 2023-01-23 | Stop reason: HOSPADM

## 2023-01-21 RX ORDER — GLUCAGON 1 MG
1 KIT INJECTION
Status: DISCONTINUED | OUTPATIENT
Start: 2023-01-21 | End: 2023-01-23 | Stop reason: HOSPADM

## 2023-01-21 RX ORDER — TALC
6 POWDER (GRAM) TOPICAL NIGHTLY PRN
Status: DISCONTINUED | OUTPATIENT
Start: 2023-01-21 | End: 2023-01-23 | Stop reason: HOSPADM

## 2023-01-21 RX ORDER — SIMETHICONE 80 MG
1 TABLET,CHEWABLE ORAL 4 TIMES DAILY PRN
Status: DISCONTINUED | OUTPATIENT
Start: 2023-01-21 | End: 2023-01-23 | Stop reason: HOSPADM

## 2023-01-21 RX ORDER — SODIUM CHLORIDE 9 MG/ML
INJECTION, SOLUTION INTRAVENOUS CONTINUOUS
Status: DISCONTINUED | OUTPATIENT
Start: 2023-01-21 | End: 2023-01-23

## 2023-01-21 RX ORDER — POTASSIUM CHLORIDE 20 MEQ/1
40 TABLET, EXTENDED RELEASE ORAL
Status: ACTIVE | OUTPATIENT
Start: 2023-01-21 | End: 2023-01-22

## 2023-01-21 RX ORDER — MAG HYDROX/ALUMINUM HYD/SIMETH 200-200-20
30 SUSPENSION, ORAL (FINAL DOSE FORM) ORAL 4 TIMES DAILY PRN
Status: DISCONTINUED | OUTPATIENT
Start: 2023-01-21 | End: 2023-01-23 | Stop reason: HOSPADM

## 2023-01-21 RX ORDER — SODIUM CHLORIDE 0.9 % (FLUSH) 0.9 %
10 SYRINGE (ML) INJECTION EVERY 12 HOURS PRN
Status: DISCONTINUED | OUTPATIENT
Start: 2023-01-21 | End: 2023-01-23 | Stop reason: HOSPADM

## 2023-01-21 RX ORDER — NALOXONE HCL 0.4 MG/ML
0.02 VIAL (ML) INJECTION
Status: DISCONTINUED | OUTPATIENT
Start: 2023-01-21 | End: 2023-01-23 | Stop reason: HOSPADM

## 2023-01-21 RX ORDER — IBUPROFEN 200 MG
24 TABLET ORAL
Status: DISCONTINUED | OUTPATIENT
Start: 2023-01-21 | End: 2023-01-23 | Stop reason: HOSPADM

## 2023-01-21 RX ORDER — MAG HYDROX/ALUMINUM HYD/SIMETH 200-200-20
30 SUSPENSION, ORAL (FINAL DOSE FORM) ORAL
Status: COMPLETED | OUTPATIENT
Start: 2023-01-21 | End: 2023-01-21

## 2023-01-21 RX ORDER — METOPROLOL TARTRATE 1 MG/ML
5 INJECTION, SOLUTION INTRAVENOUS
Status: COMPLETED | OUTPATIENT
Start: 2023-01-21 | End: 2023-01-21

## 2023-01-21 RX ORDER — GLUCAGON 1 MG
1 KIT INJECTION
Status: COMPLETED | OUTPATIENT
Start: 2023-01-21 | End: 2023-01-21

## 2023-01-21 RX ORDER — LIDOCAINE HYDROCHLORIDE 20 MG/ML
15 SOLUTION OROPHARYNGEAL
Status: COMPLETED | OUTPATIENT
Start: 2023-01-21 | End: 2023-01-21

## 2023-01-21 RX ORDER — IBUPROFEN 200 MG
16 TABLET ORAL
Status: DISCONTINUED | OUTPATIENT
Start: 2023-01-21 | End: 2023-01-23 | Stop reason: HOSPADM

## 2023-01-21 RX ADMIN — CEFEPIME 2 G: 2 INJECTION, POWDER, FOR SOLUTION INTRAVENOUS at 09:01

## 2023-01-21 RX ADMIN — SODIUM CHLORIDE: 9 INJECTION, SOLUTION INTRAVENOUS at 10:01

## 2023-01-21 RX ADMIN — ALUMINUM HYDROXIDE, MAGNESIUM HYDROXIDE, AND DIMETHICONE 30 ML: 200; 20; 200 SUSPENSION ORAL at 07:01

## 2023-01-21 RX ADMIN — IOHEXOL 75 ML: 350 INJECTION, SOLUTION INTRAVENOUS at 08:01

## 2023-01-21 RX ADMIN — LIDOCAINE HYDROCHLORIDE 15 ML: 20 SOLUTION ORAL at 07:01

## 2023-01-21 RX ADMIN — METOROPROLOL TARTRATE 5 MG: 5 INJECTION, SOLUTION INTRAVENOUS at 06:01

## 2023-01-21 RX ADMIN — GLUCAGON 1 MG: KIT at 07:01

## 2023-01-21 RX ADMIN — SODIUM CHLORIDE, POTASSIUM CHLORIDE, SODIUM LACTATE AND CALCIUM CHLORIDE 1000 ML: 600; 310; 30; 20 INJECTION, SOLUTION INTRAVENOUS at 06:01

## 2023-01-21 NOTE — Clinical Note
Diagnosis: SIRS (systemic inflammatory response syndrome) [791171]   Admitting Provider:: EZEQUIEL MADRID [[id number]]   Future Attending Provider: EZEQUIEL MADRID [[id number]]   Reason for IP Medical Treatment  (Clinical interventions that can only be accomplished in the IP setting? ) :: Abnormal CT Gallbladder- obtain HIDA, IV abx   Estimated Length of Stay:: 2 midnights   I certify that Inpatient services for greater than or equal to 2 midnights are medically necessary:: Yes   Plans for Post-Acute care--if anticipated (pick the single best option):: A. No post acute care anticipated at this time   Special Needs:: No Special Needs [1]

## 2023-01-21 NOTE — FIRST PROVIDER EVALUATION
Medical screening examination initiated.  I have conducted a focused provider triage encounter, findings are as follows:    Brief history of present illness:  Weakness, fatigue with subjective fever at nursing home.  Patient tachycardic with irregular rate in triage.    Vitals:    01/21/23 1624   BP: 105/74   BP Location: Right arm   Patient Position: Sitting   Pulse: (!) 137   Resp: 20   Temp: 98.3 °F (36.8 °C)   TempSrc: Oral   SpO2: (!) 94%       Pertinent physical exam:  Tachycardic, no acute distress    Brief workup plan:  Workup and further evaluation    Preliminary workup initiated; this workup will be continued and followed by the physician or advanced practice provider that is assigned to the patient when roomed.

## 2023-01-22 PROBLEM — R93.2 ABNORMAL CT SCAN, GALLBLADDER: Status: ACTIVE | Noted: 2023-01-22

## 2023-01-22 PROBLEM — K59.03 DRUG INDUCED CONSTIPATION: Status: ACTIVE | Noted: 2023-01-22

## 2023-01-22 PROBLEM — D72.829 LEUKOCYTOSIS: Status: ACTIVE | Noted: 2023-01-22

## 2023-01-22 PROBLEM — R53.1 GENERALIZED WEAKNESS: Status: ACTIVE | Noted: 2021-11-24

## 2023-01-22 PROBLEM — F51.01 PRIMARY INSOMNIA: Status: ACTIVE | Noted: 2023-01-22

## 2023-01-22 PROBLEM — R93.89 ABNORMAL FINDING ON CT SCAN: Status: ACTIVE | Noted: 2023-01-22

## 2023-01-22 PROBLEM — E87.6 HYPOKALEMIA: Status: ACTIVE | Noted: 2023-01-22

## 2023-01-22 PROBLEM — I49.9 IRREGULAR HEART RHYTHM: Status: ACTIVE | Noted: 2023-01-22

## 2023-01-22 LAB
ALBUMIN SERPL BCP-MCNC: 3 G/DL (ref 3.5–5.2)
ALP SERPL-CCNC: 61 U/L (ref 55–135)
ALT SERPL W/O P-5'-P-CCNC: 9 U/L (ref 10–44)
ANION GAP SERPL CALC-SCNC: 11 MMOL/L (ref 8–16)
AST SERPL-CCNC: 14 U/L (ref 10–40)
BASOPHILS # BLD AUTO: 0.03 K/UL (ref 0–0.2)
BASOPHILS NFR BLD: 0.3 % (ref 0–1.9)
BILIRUB SERPL-MCNC: 1.1 MG/DL (ref 0.1–1)
BUN SERPL-MCNC: 9 MG/DL (ref 10–30)
CALCIUM SERPL-MCNC: 8.9 MG/DL (ref 8.7–10.5)
CHLORIDE SERPL-SCNC: 106 MMOL/L (ref 95–110)
CO2 SERPL-SCNC: 23 MMOL/L (ref 23–29)
CREAT SERPL-MCNC: 0.5 MG/DL (ref 0.5–1.4)
DIFFERENTIAL METHOD: ABNORMAL
EOSINOPHIL # BLD AUTO: 0 K/UL (ref 0–0.5)
EOSINOPHIL NFR BLD: 0.1 % (ref 0–8)
ERYTHROCYTE [DISTWIDTH] IN BLOOD BY AUTOMATED COUNT: 17 % (ref 11.5–14.5)
EST. GFR  (NO RACE VARIABLE): >60 ML/MIN/1.73 M^2
GLUCOSE SERPL-MCNC: 90 MG/DL (ref 70–110)
HCT VFR BLD AUTO: 32.9 % (ref 37–48.5)
HGB BLD-MCNC: 10.7 G/DL (ref 12–16)
IMM GRANULOCYTES # BLD AUTO: 0.05 K/UL (ref 0–0.04)
IMM GRANULOCYTES NFR BLD AUTO: 0.4 % (ref 0–0.5)
LACTATE SERPL-SCNC: 0.8 MMOL/L (ref 0.5–2.2)
LYMPHOCYTES # BLD AUTO: 0.8 K/UL (ref 1–4.8)
LYMPHOCYTES NFR BLD: 7.5 % (ref 18–48)
MCH RBC QN AUTO: 27.6 PG (ref 27–31)
MCHC RBC AUTO-ENTMCNC: 32.5 G/DL (ref 32–36)
MCV RBC AUTO: 85 FL (ref 82–98)
MONOCYTES # BLD AUTO: 0.5 K/UL (ref 0.3–1)
MONOCYTES NFR BLD: 4.1 % (ref 4–15)
NEUTROPHILS # BLD AUTO: 9.8 K/UL (ref 1.8–7.7)
NEUTROPHILS NFR BLD: 87.6 % (ref 38–73)
NRBC BLD-RTO: 0 /100 WBC
PLATELET # BLD AUTO: 233 K/UL (ref 150–450)
PMV BLD AUTO: 10.2 FL (ref 9.2–12.9)
POTASSIUM SERPL-SCNC: 3.6 MMOL/L (ref 3.5–5.1)
PROCALCITONIN SERPL IA-MCNC: 3.8 NG/ML
PROT SERPL-MCNC: 6.4 G/DL (ref 6–8.4)
RBC # BLD AUTO: 3.87 M/UL (ref 4–5.4)
SODIUM SERPL-SCNC: 140 MMOL/L (ref 136–145)
WBC # BLD AUTO: 11.19 K/UL (ref 3.9–12.7)

## 2023-01-22 PROCEDURE — 80053 COMPREHEN METABOLIC PANEL: CPT | Mod: HCNC | Performed by: FAMILY MEDICINE

## 2023-01-22 PROCEDURE — 11000001 HC ACUTE MED/SURG PRIVATE ROOM: Mod: HCNC

## 2023-01-22 PROCEDURE — 25000003 PHARM REV CODE 250: Mod: HCNC | Performed by: NURSE PRACTITIONER

## 2023-01-22 PROCEDURE — 99223 PR INITIAL HOSPITAL CARE,LEVL III: ICD-10-PCS | Mod: HCNC,,, | Performed by: SURGERY

## 2023-01-22 PROCEDURE — 63600175 PHARM REV CODE 636 W HCPCS: Mod: HCNC | Performed by: NURSE PRACTITIONER

## 2023-01-22 PROCEDURE — 96366 THER/PROPH/DIAG IV INF ADDON: CPT | Mod: HCNC

## 2023-01-22 PROCEDURE — 99223 1ST HOSP IP/OBS HIGH 75: CPT | Mod: HCNC,,, | Performed by: SURGERY

## 2023-01-22 PROCEDURE — 83605 ASSAY OF LACTIC ACID: CPT | Mod: HCNC | Performed by: FAMILY MEDICINE

## 2023-01-22 PROCEDURE — 25000003 PHARM REV CODE 250: Mod: HCNC | Performed by: FAMILY MEDICINE

## 2023-01-22 PROCEDURE — 85025 COMPLETE CBC W/AUTO DIFF WBC: CPT | Mod: HCNC | Performed by: FAMILY MEDICINE

## 2023-01-22 PROCEDURE — 36415 COLL VENOUS BLD VENIPUNCTURE: CPT | Mod: HCNC | Performed by: FAMILY MEDICINE

## 2023-01-22 PROCEDURE — 84145 PROCALCITONIN (PCT): CPT | Mod: HCNC | Performed by: FAMILY MEDICINE

## 2023-01-22 RX ORDER — OXYCODONE HYDROCHLORIDE 5 MG/1
15 TABLET ORAL 2 TIMES DAILY PRN
Status: DISCONTINUED | OUTPATIENT
Start: 2023-01-22 | End: 2023-01-23 | Stop reason: HOSPADM

## 2023-01-22 RX ORDER — POTASSIUM CHLORIDE 20 MEQ/1
40 TABLET, EXTENDED RELEASE ORAL ONCE
Status: COMPLETED | OUTPATIENT
Start: 2023-01-22 | End: 2023-01-22

## 2023-01-22 RX ORDER — MIDODRINE HYDROCHLORIDE 2.5 MG/1
2.5 TABLET ORAL
Status: DISCONTINUED | OUTPATIENT
Start: 2023-01-22 | End: 2023-01-23 | Stop reason: HOSPADM

## 2023-01-22 RX ORDER — DOCUSATE SODIUM 100 MG/1
100 CAPSULE, LIQUID FILLED ORAL DAILY PRN
COMMUNITY

## 2023-01-22 RX ORDER — METOPROLOL TARTRATE 25 MG/1
25 TABLET, FILM COATED ORAL 2 TIMES DAILY
Status: DISCONTINUED | OUTPATIENT
Start: 2023-01-22 | End: 2023-01-23 | Stop reason: HOSPADM

## 2023-01-22 RX ORDER — DOCUSATE SODIUM 100 MG/1
100 CAPSULE, LIQUID FILLED ORAL DAILY PRN
Status: DISCONTINUED | OUTPATIENT
Start: 2023-01-22 | End: 2023-01-23 | Stop reason: HOSPADM

## 2023-01-22 RX ADMIN — SODIUM CHLORIDE: 9 INJECTION, SOLUTION INTRAVENOUS at 01:01

## 2023-01-22 RX ADMIN — POTASSIUM CHLORIDE 40 MEQ: 1500 TABLET, EXTENDED RELEASE ORAL at 11:01

## 2023-01-22 RX ADMIN — CEFEPIME 2 G: 2 INJECTION, POWDER, FOR SOLUTION INTRAVENOUS at 10:01

## 2023-01-22 RX ADMIN — CEFEPIME 2 G: 2 INJECTION, POWDER, FOR SOLUTION INTRAVENOUS at 08:01

## 2023-01-22 RX ADMIN — METOPROLOL TARTRATE 25 MG: 25 TABLET, FILM COATED ORAL at 10:01

## 2023-01-22 RX ADMIN — METOPROLOL TARTRATE 25 MG: 25 TABLET, FILM COATED ORAL at 08:01

## 2023-01-22 NOTE — CARE UPDATE
HIDA scan was negative.  Case dw Dr. Barton (General surgery) who recommended ERCP vs MRCP.  Options dw GI who recommended MRCP first and then will determine next step.    MRCP ordered.  Dr. Nichole  (GI) consulted for assistance.  Likely no interventions if MRCP is negative.

## 2023-01-22 NOTE — PLAN OF CARE
Patient resting in bed comfortably , all orders acknowledged and carried out . No distress noted , will continue to monitor

## 2023-01-22 NOTE — ASSESSMENT & PLAN NOTE
WBC of 14.8. Elevation likely 2/2 due to intrabdominal infection/inflammatory process/acute findings of distending GB w/biliary dilation vs unknown infections process. Initiated on 2g cefepime in ED. Flu/covid negative. procal of 1.61. UA wnl. CXR shows cardiomegaly.  Plan:  -continue cefepime 2g q12hrs  -f/u blood cultures  -gentle IVFs  -f/u HIDA scan results  -monitor labs

## 2023-01-22 NOTE — ASSESSMENT & PLAN NOTE
Evaluated by PCP (Odilia Cueto NP) on 1/18/23. Per note from visit:    Irregular heart rhythm: Current /78, heart rate 72 and regular. Currently RRR with controlled rate.  Resident refusing to take BB and midodrin as she reports she doesn't need these meds any more.  Had long discussion with resident, explained what medications are used for, explained why she was started on these medications and resident remains adamant she will not take these medications.    (H/O: In SVT in ambulance November 2022 - spontaneously converted.   Initiated on low dose BB at [facility]  Seen 12/7 by cardiology - initiated on Midodrine TID)   Plan:  -continue home medications (midodrine and metoprolol)  -tele monitoring

## 2023-01-22 NOTE — PROGRESS NOTES
[facility]  Hospital Medicine  Progress Note    Patient Name: Francie Olsen  MRN: [id number]  Patient Class: IP- Inpatient   Admission Date: 1/21/2023  Length of Stay: 1 days  Attending Physician: No att. providers found  Primary Care Provider: Marcie Espinal MD        Subjective:     Principal Problem:Generalized weakness        HPI:  Francie Olsen is a 89 y.o. female with a PMH  has a past medical history of Acute blood loss anemia (11/24/2021), Chronic low back pain, Chronic mid back pain, Familial tremor, Hearing loss, Hyperlipemia, Hypertension, Osteoarthritis, Osteoporosis, unspecified, SCC (squamous cell carcinoma), hand (2015), and Squamous cell carcinoma (9-10yrs ago).  Presents to the ER for evaluation of generalized weakness/fatigue.  Patient from assisted living facility and staff at the facility noted patient's temperature to be 2° higher than her baseline temperature .  Patient's complaint this time is fatigue, non-productive cough and chronic back pain.  History of compression fractures and thoracic and lumbar region.  Otherwise, patient voices no complaints at this time.      ER workup revealed leukocytosis 14.8, potassium of 3.3, CBG 2119 mg/dL, , program of 1.61, chest x-ray cardiomegaly, flu/COVID negative, UA negative, initial EKG shows sinus tachycardia with a ventricular the rate of 145 beats per minute with a QT/QTC of 336/521.  Repeat EKG after IV fluids and treatment revealed sinus rhythm with first-degree AV block with PVCs with ventricular rate of 77 beats per minute.  QT/ QTC 4/452.  ER for fall CT of chest and abdomen/pelvis with contrast due to meeting SIRS criteria. Imaging revealed:[Distended gallbladder with intra and extrahepatic biliary dilatation.  Small noncalcified gallstone].  General surgery consult and recommended HIDA scan.  If positive consult IR for possible michelle tube (per secure chat from ER provider Dr. Stafford).  Hospital Medicine consulted to  admit patient for HIDA scan and further workup.  Patient will be under inpatient status.  PCP: Marcie Espinal          Overview/Hospital Course:  No notes on file    Interval History: Patient admitted by on call MD last night.  Patient was seen and examined by me this morning, no family at bedside.  She is resting comfortably, but awakened to verbal stimuli.  Awaiting HIDA scan this morning.      Review of Systems  Objective:     Vital Signs (Most Recent):  Temp: 99.4 °F (37.4 °C) (01/21/23 2130)  Pulse: (!) 123 (01/22/23 0829)  Resp: 20 (01/22/23 0700)  BP: (!) 151/77 (01/22/23 0829)  SpO2: 95 % (01/22/23 0700)   Vital Signs (24h Range):  Temp:  [98.3 °F (36.8 °C)-99.4 °F (37.4 °C)] 99.4 °F (37.4 °C)  Pulse:  [] 123  Resp:  [17-20] 20  SpO2:  [93 %-96 %] 95 %  BP: (105-179)/(63-92) 151/77     Weight: 41.7 kg (92 lb)  Body mass index is 16.3 kg/m².    Intake/Output Summary (Last 24 hours) at 1/22/2023 0845  Last data filed at 1/21/2023 2201  Gross per 24 hour   Intake 1050 ml   Output 450 ml   Net 600 ml      Physical Exam  Vitals and nursing note reviewed.   Constitutional:       Appearance: Normal appearance.   HENT:      Head: Normocephalic and atraumatic.      Nose: Nose normal.      Mouth/Throat:      Mouth: Mucous membranes are moist.   Eyes:      Extraocular Movements: Extraocular movements intact.      Conjunctiva/sclera: Conjunctivae normal.   Cardiovascular:      Rate and Rhythm: Normal rate and regular rhythm.      Pulses: Normal pulses.      Heart sounds: Normal heart sounds.   Pulmonary:      Effort: Pulmonary effort is normal.      Breath sounds: Normal breath sounds.   Abdominal:      General: Abdomen is flat. Bowel sounds are normal.      Palpations: Abdomen is soft.      Tenderness: There is abdominal tenderness. There is no guarding or rebound.      Hernia: No hernia is present.   Musculoskeletal:         General: Normal range of motion.      Cervical back: Normal range of motion and neck  supple.   Skin:     General: Skin is warm.      Capillary Refill: Capillary refill takes less than 2 seconds.   Neurological:      General: No focal deficit present.      Mental Status: She is alert. Mental status is at baseline.   Psychiatric:         Mood and Affect: Mood normal.         Behavior: Behavior normal.       Significant Labs: All pertinent labs within the past 24 hours have been reviewed.  Recent Lab Results  (Last 5 results in the past 24 hours)        01/22/23  0827   01/21/23  2038   01/21/23  1852   01/21/23  1731   01/21/23  1659        Procalcitonin         1.61  Comment: A concentration < 0.25 ng/mL represents a low risk of bacterial   infection.  Procalcitonin may not be accurate among patients with localized   infection, recent trauma or major surgery, immunosuppressed state,   invasive fungal infection, renal dysfunction. Decisions regarding   initiation or continuation of antibiotic therapy should not be based   solely on procalcitonin levels.         Albumin         3.4       Alkaline Phosphatase         63       ALT         11       Anion Gap         13       Appearance, UA     Clear           AST         16       Baso # 0.03         0.04       Basophil % 0.3         0.3       Bilirubin (UA)     Negative           BILIRUBIN TOTAL         1.0  Comment: For infants and newborns, interpretation of results should be based  on gestational age, weight and in agreement with clinical  observations.    Premature Infant recommended reference ranges:  Up to 24 hours.............<8.0 mg/dL  Up to 48 hours............<12.0 mg/dL  3-5 days..................<15.0 mg/dL  6-29 days.................<15.0 mg/dL         Blood Culture, Routine       No growth to date  [P]         BNP         210  Comment: Values of less than 100 pg/ml are consistent with non-CHF populations.       BUN         10       Calcium         8.7       Chloride         102       CO2         21       Color, UA     Yellow            Creatinine         0.6       Differential Method Automated         Automated       eGFR         >60       Eos # 0.0         0.0       Eosinophil % 0.1         0.0       Glucose         119       Glucose, UA     Negative           Gran # (ANC) 9.8         13.7       Gran % 87.6         92.6       Hematocrit 32.9         34.9       Hemoglobin 10.7         11.4       Immature Grans (Abs) 0.05  Comment: Mild elevation in immature granulocytes is non specific and   can be seen in a variety of conditions including stress response,   acute inflammation, trauma and pregnancy. Correlation with other   laboratory and clinical findings is essential.           0.08  Comment: Mild elevation in immature granulocytes is non specific and   can be seen in a variety of conditions including stress response,   acute inflammation, trauma and pregnancy. Correlation with other   laboratory and clinical findings is essential.         Immature Granulocytes 0.4         0.5       Ketones, UA     1+           Lactate, Yemi   2.1  Comment: Falsely low lactic acid results can be found in samples   containing >=13.0 mg/dL total bilirubin and/or >=3.5 mg/dL   direct bilirubin.         1.4  Comment: Falsely low lactic acid results can be found in samples   containing >=13.0 mg/dL total bilirubin and/or >=3.5 mg/dL   direct bilirubin.         Leukocytes, UA     Negative           Lymph # 0.8         0.4       Lymph % 7.5         2.8       MCH 27.6         27.4       MCHC 32.5         32.7       MCV 85         84       Mono # 0.5         0.6       Mono % 4.1         3.8       MPV 10.2         10.3       NITRITE UA     Negative           nRBC 0         0       Occult Blood UA     Negative           pH, UA     7.0           Platelets 233         249       Potassium         3.3       PROTEIN TOTAL         7.0       Protein, UA     Negative  Comment: Recommend a 24 hour urine protein or a urine   protein/creatinine ratio if globulin induced proteinuria  is  clinically suspected.             RBC 3.87         4.16       RDW 17.0         16.5       Sodium         136       Specific [place], UA     1.015           Specimen UA     Urine, Catheterized           Troponin I         0.024  Comment: The reference interval for Troponin I represents the 99th percentile   cutoff   for our facility and is consistent with 3rd generation assay   performance.         UROBILINOGEN UA     Negative           WBC 11.19         14.80                               [P] - Preliminary Result               Significant Imaging: I have reviewed all pertinent imaging results/findings within the past 24 hours.    CT Chest Abdomen Pelvis With Contrast (xpd)   Final Result      Senescent changes.  No definite acute process seen      Distended gallbladder with intra and extrahepatic biliary dilatation.  Small noncalcified gallstone.      Mild atherosclerotic changes.      Moderate amount of stool in the colon      Small noncalcified gallstone      Right ovarian cystic structure      Severe compression deformity of the L2      Remaining findings as above      All CT scans   are performed using dose optimization techniques including the following: automated exposure control; adjustment of the mA and/or kV; use of iterative reconstruction technique.  Dose modulation was employed for ALARA by means of: Automated exposure control; adjustment of the mA and/or kV according to patient size (this includes techniques or standardized protocols for targeted exams where dose is matched to indication/reason for exam; i.e. extremities or head); and/or use of iterative reconstructive technique.         Electronically signed by: Serjio Marina   Date:    01/21/2023   Time:    20:42      X-Ray Chest AP Portable   Final Result      As above         Electronically signed by: Serjio Marina   Date:    01/21/2023   Time:    16:48      NM Hepatobiliary Scan (HIDA)    (Results Pending)           Assessment/Plan:      *  Generalized weakness  WBC of 14.8. Elevation likely 2/2 due to intrabdominal infection/inflammatory process/acute findings of distending GB w/biliary dilation vs unknown infections process. Initiated on 2g cefepime in ED. Flu/covid negative. procal of 1.61. UA wnl. CXR shows cardiomegaly. VSS.  Plan:  -continue cefepime 2g q12hrs  -f/u blood cultures  -gentle IVFs  -f/u HIDA scan results  -monitor labs      Drug induced constipation  -Movantik 25 mg QD PRN  -Colace 100mg QD PRN      Primary insomnia  -5mg melatonin qHS prn      Irregular heart rhythm  Evaluated by PCP (Odilia Cueto NP) on 1/18/23. Per note from visit:    Irregular heart rhythm: Current /78, heart rate 72 and regular. Currently RRR with controlled rate.  Resident refusing to take BB and midodrin as she reports she doesn't need these meds any more.  Had long discussion with resident, explained what medications are used for, explained why she was started on these medications and resident remains adamant she will not take these medications.    (H/O: In SVT in ambulance November 2022 - spontaneously converted.   Initiated on low dose BB at [facility]  Seen 12/7 by cardiology - initiated on Midodrine TID)   Plan:  -continue home medications (midodrine and metoprolol)  -tele monitoring      Hypokalemia  Patient has hypokalemia which is currently treated with po potassium in ED.. Last electrolytes reviewed-   Recent Labs   Lab 01/21/23  1659   K 3.3*   . Will replace potassium and monitor electrolytes closely.   - Received 40 meq in the ER and ordered an additional 40 meq this morning.        Abnormal finding on CT scan  Findings of [distended gallbladder with intra and extrahepatic biliary dilatation.  Small noncalcified gallstone].  General surgery consult and recommended HIDA scan.  If positive consult IR for possible Jeniffer tube.  Plan:  -HIDA scan pending  -GenSurg consult  -IR consult if HIDA scan positive  -NPO for scan.  Will advance if no plans  for procedure today.    Leukocytosis  WBC of 14.8. Elevation likely 2/2 due to intrabdominal infection/inflammatory process/acute findings of distending GB w/biliary dilation vs unknown infections process. Initiated on 2g cefepime in ED. Flu/covid negative. procal of 1.61. UA wnl. CXR shows cardiomegaly.  Plan:  -continue cefepime 2g q12hrs  -f/u blood cultures  -gentle IVFs  -f/u HIDA scan results  -monitor labs    Chronic low back pain  Chronic low back pain with sciatica, sciatica laterality unspecified, unspecified back pain laterality   History of fall on 11/19/22 with- multiple compression fractures, at that time she was seen by Ortho and felt no surgical interventions but would be ordered therapy and medicated for pain. Resident is now seen by pain management doctor.  Oxycodone 15mg BID PRN for pain;   Followed by pain management - Dr. Gregg Zimmerman at [facility]          VTE Risk Mitigation (From admission, onward)         Ordered     IP VTE HIGH RISK PATIENT  Once         01/21/23 2201     Place sequential compression device  Until discontinued         01/21/23 2201                Discharge Planning   JULIANA:      Code Status: Full Code   Is the patient medically ready for discharge?:     Reason for patient still in hospital (select all that apply): Patient trending condition                     Mirella Yan MD  Department of Hospital Medicine   [facility] - Emergency Dept.

## 2023-01-22 NOTE — ASSESSMENT & PLAN NOTE
Findings of [distended gallbladder with intra and extrahepatic biliary dilatation.  Small noncalcified gallstone].  General surgery consult and recommended HIDA scan.  If positive consult IR for possible Jeniffer tube.  Plan:  -HIDA scan pending  -GenSurg consult  -IR consult if HIDA scan positive  -NPO for scan.  Will advance if no plans for procedure today.

## 2023-01-22 NOTE — ASSESSMENT & PLAN NOTE
Findings of [distended gallbladder with intra and extrahepatic biliary dilatation.  Small noncalcified gallstone].  General surgery consult and recommended HIDA scan.  If positive consult IR for possible Jeniffer tube.  Plan:  -HIDA scan in am  -GenSurg consult  -IR consult if HIDA scan positive

## 2023-01-22 NOTE — PHARMACY MED REC
"Admission Medication History     The home medication history was taken by Viktor Hagan.    You may go to "Admission" then "Reconcile Home Medications" tabs to review and/or act upon these items.     The home medication list has been updated by the Pharmacy department.   Please read ALL comments highlighted in yellow.   Please address this information as you see fit.    Feel free to contact us if you have any questions or require assistance.      Medications listed below were obtained from: Analytic software- [employer] and Medical records  (Not in a hospital admission)      Viktor Hagan  [id number]    Current Outpatient Medications on File Prior to Encounter   Medication Sig Dispense Refill Last Dose    docusate sodium (COLACE) 100 MG capsule Take 100 mg by mouth daily as needed for Constipation.   Unknown    melatonin (MELATIN) 5 mg Take 5 mg by mouth nightly as needed.   Unknown    metoprolol tartrate (LOPRESSOR) 25 MG tablet Take 1 tablet (25 mg total) by mouth 2 (two) times daily. 60 tablet 0     midodrine (PROAMATINE) 2.5 MG Tab TAKE 1 TAB BY MOUTH THREE TIMES DAILY 90 tablet 0 Unknown    naloxegoL (MOVANTIK) 25 mg tablet Take 1 tablet by mouth once a day as needed for pain 30 tablet 3 Unknown    oxyCODONE (ROXICODONE) 15 MG Tab Take 1 tablet by mouth twice a day as needed for pain 60 tablet 0 Unknown                           .        "

## 2023-01-22 NOTE — ED PROVIDER NOTES
"SCRIBE #1 NOTE: I, Amanda Edge, am scribing for, and in the presence of, Anthony Carrington MD. I have scribed the HPI, ROS, and Pex.    SCRIBE #2 NOTE: I, Valeriano Bourne, am scribing for, and in the presence of,  Evan Stafford MD. I have scribed the remaining portions of the note not scribed by Scribe #1.    History     Chief Complaint   Patient presents with    Fatigue     Fatigue and cough      Review of patient's allergies indicates:   Allergen Reactions    Codeine Nausea And Vomiting and Nausea Only    Meperidine Nausea And Vomiting and Nausea Only         History of Present Illness     HPI    2023, 6:43 PM  History obtained from the patient      History of Present Illness: Francie Olsen is a 89 y.o. female patient with a PMHx of osteoporosis, HTN, and HLD who presents to the Emergency Department for evaluation of fatigue. Assisted living nurse reported pt's temperature "was two degrees higher than her baseline temperature". Pt also complains of back pain x 1yr.  Symptoms are constant and moderate in severity. No mitigating or exacerbating factors reported. No further complaints or concerns at this time.       Arrival mode: Personal vehicle      PCP: Marcie Espinal MD        Past Medical History:  Past Medical History:   Diagnosis Date    Acute blood loss anemia 2021    Chronic low back pain     Chronic mid back pain     Familial tremor     Hearing loss     Hyperlipemia     Hypertension     Osteoarthritis     Osteoporosis, unspecified     SCC (squamous cell carcinoma), hand 2015    hand    Squamous cell carcinoma 9-10yrs ago    left ear       Past Surgical History:  Past Surgical History:   Procedure Laterality Date    CATARACT EXTRACTION      FEMUR FRACTURE SURGERY Right     HIP FRACTURE SURGERY Left aprox 2010    implant for nerve pain      YAG OU           Family History:  Family History   Problem Relation Age of Onset    Cancer Mother          54 uterine    Diabetes Son     " Blindness Neg Hx     Cataracts Neg Hx     Glaucoma Neg Hx     Hypertension Neg Hx     Macular degeneration Neg Hx     Retinal detachment Neg Hx     Strabismus Neg Hx     Stroke Neg Hx     Thyroid disease Neg Hx     Melanoma Neg Hx        Social History:  Social History     Tobacco Use    Smoking status: Former    Smokeless tobacco: Never   Substance and Sexual Activity    Alcohol use: No     Alcohol/week: 0.0 standard drinks    Drug use: No    Sexual activity: Never     Birth control/protection: Post-menopausal        Review of Systems     Review of Systems   Constitutional:  Positive for fatigue. Negative for fever.   HENT:  Negative for sore throat.    Respiratory:  Negative for cough and shortness of breath.    Cardiovascular:  Negative for chest pain.   Gastrointestinal:  Negative for nausea.   Genitourinary:  Negative for dysuria.   Musculoskeletal:  Negative for back pain.   Skin:  Negative for rash.   Neurological:  Positive for weakness.   Hematological:  Does not bruise/bleed easily.   All other systems reviewed and are negative.   Physical Exam     Initial Vitals [01/21/23 1624]   BP Pulse Resp Temp SpO2   105/74 (!) 137 20 98.3 °F (36.8 °C) (!) 94 %      MAP       --          Physical Exam  Nursing Notes and Vital Signs reviewed.    Constitutional: Patient is in no acute distress.   Head: Normocephalic. Atraumatic.   Eyes:  Conjunctivae are not pale. No scleral icterus.   ENT: Mucous membranes dry.  Neck: Supple.   Cardiovascular: Tachycardic. Regular rhythm.   Pulmonary: No respiratory distress.   Abdominal: Non-distended.   Musculoskeletal: Moves all extremities. No obvious deformities.   Skin: Warm and dry. Poor skin turgor.   Neurological:  Alert, awake, and appropriate. Normal speech. No acute lateralizing neurologic deficits appreciated. Hard of hearing.  Psychiatric: Normal affect.        ED Course   Critical Care    Date/Time: 1/21/2023 9:30 PM  Performed by: Evan Stafford,  MD  Authorized by: Evan Stafford MD   Direct patient critical care time: 25 minutes  Additional history critical care time: 10 minutes  Ordering / reviewing critical care time: 10 minutes  Documentation critical care time: 10 minutes  Total critical care time (exclusive of procedural time) : 55 minutes  Critical care time was exclusive of separately billable procedures and treating other patients and teaching time.  Critical care was necessary to treat or prevent imminent or life-threatening deterioration of the following conditions: sepsis.  Critical care was time spent personally by me on the following activities: blood draw for specimens, development of treatment plan with patient or surrogate, interpretation of cardiac output measurements, evaluation of patient's response to treatment, examination of patient, obtaining history from patient or surrogate, ordering and performing treatments and interventions, ordering and review of laboratory studies, ordering and review of radiographic studies, re-evaluation of patient's condition, pulse oximetry and review of old charts.      ED Vital Signs:  Vitals:    01/21/23 1624 01/21/23 1700 01/21/23 1711 01/21/23 1730   BP: 105/74 120/66  136/82   Pulse: (!) 137 (!) 121  (!) 136   Resp: 20 20  19   Temp: 98.3 °F (36.8 °C)      TempSrc: Oral      SpO2: (!) 94% (!) 94%  (!) 94%   Weight:   41.7 kg (92 lb)     01/21/23 1830 01/21/23 1858 01/21/23 1930 01/21/23 2000   BP: (!) 135/92 (!) 135/92 (!) 163/76    Pulse: (!) 134 (!) 123 110 92   Resp: 20 19 20 20   Temp:       TempSrc:       SpO2: (!) 93% (!) 94% 95% (!) 94%   Weight:        01/21/23 2021 01/21/23 2100   BP: (!) 161/79 (!) 146/65   Pulse: 83 67   Resp: 18 17   Temp:     TempSrc:     SpO2: 95% 95%   Weight:         Abnormal Lab Results:  Labs Reviewed   CBC W/ AUTO DIFFERENTIAL - Abnormal; Notable for the following components:       Result Value    WBC 14.80 (*)     Hemoglobin 11.4 (*)     Hematocrit 34.9  (*)     RDW 16.5 (*)     Gran # (ANC) 13.7 (*)     Immature Grans (Abs) 0.08 (*)     Lymph # 0.4 (*)     Gran % 92.6 (*)     Lymph % 2.8 (*)     Mono % 3.8 (*)     All other components within normal limits   COMPREHENSIVE METABOLIC PANEL - Abnormal; Notable for the following components:    Potassium 3.3 (*)     CO2 21 (*)     Glucose 119 (*)     Albumin 3.4 (*)     All other components within normal limits   URINALYSIS, REFLEX TO URINE CULTURE - Abnormal; Notable for the following components:    Ketones, UA 1+ (*)     All other components within normal limits    Narrative:     Specimen Source->Urine   PROCALCITONIN - Abnormal; Notable for the following components:    Procalcitonin 1.61 (*)     All other components within normal limits   B-TYPE NATRIURETIC PEPTIDE - Abnormal; Notable for the following components:     (*)     All other components within normal limits   INFLUENZA A & B BY MOLECULAR   CULTURE, BLOOD   CULTURE, BLOOD   LACTIC ACID, PLASMA   LACTIC ACID, PLASMA   TROPONIN I        All Lab Results:  Results for orders placed or performed during the hospital encounter of 01/21/23   Influenza A & B by Molecular    Specimen: Nasopharyngeal Swab   Result Value Ref Range    Influenza A, Molecular Negative Negative    Influenza B, Molecular Negative Negative    Flu A & B Source Nasal swab    CBC auto differential   Result Value Ref Range    WBC 14.80 (H) 3.90 - 12.70 K/uL    RBC 4.16 4.00 - 5.40 M/uL    Hemoglobin 11.4 (L) 12.0 - 16.0 g/dL    Hematocrit 34.9 (L) 37.0 - 48.5 %    MCV 84 82 - 98 fL    MCH 27.4 27.0 - 31.0 pg    MCHC 32.7 32.0 - 36.0 g/dL    RDW 16.5 (H) 11.5 - 14.5 %    Platelets 249 150 - 450 K/uL    MPV 10.3 9.2 - 12.9 fL    Immature Granulocytes 0.5 0.0 - 0.5 %    Gran # (ANC) 13.7 (H) 1.8 - 7.7 K/uL    Immature Grans (Abs) 0.08 (H) 0.00 - 0.04 K/uL    Lymph # 0.4 (L) 1.0 - 4.8 K/uL    Mono # 0.6 0.3 - 1.0 K/uL    Eos # 0.0 0.0 - 0.5 K/uL    Baso # 0.04 0.00 - 0.20 K/uL    nRBC 0 0 /100  WBC    Gran % 92.6 (H) 38.0 - 73.0 %    Lymph % 2.8 (L) 18.0 - 48.0 %    Mono % 3.8 (L) 4.0 - 15.0 %    Eosinophil % 0.0 0.0 - 8.0 %    Basophil % 0.3 0.0 - 1.9 %    Differential Method Automated    Comprehensive metabolic panel   Result Value Ref Range    Sodium 136 136 - 145 mmol/L    Potassium 3.3 (L) 3.5 - 5.1 mmol/L    Chloride 102 95 - 110 mmol/L    CO2 21 (L) 23 - 29 mmol/L    Glucose 119 (H) 70 - 110 mg/dL    BUN 10 10 - 30 mg/dL    Creatinine 0.6 0.5 - 1.4 mg/dL    Calcium 8.7 8.7 - 10.5 mg/dL    Total Protein 7.0 6.0 - 8.4 g/dL    Albumin 3.4 (L) 3.5 - 5.2 g/dL    Total Bilirubin 1.0 0.1 - 1.0 mg/dL    Alkaline Phosphatase 63 55 - 135 U/L    AST 16 10 - 40 U/L    ALT 11 10 - 44 U/L    Anion Gap 13 8 - 16 mmol/L    eGFR >60 >60 mL/min/1.73 m^2   Lactic acid, plasma #1   Result Value Ref Range    Lactate (Lactic Acid) 1.4 0.5 - 2.2 mmol/L   Lactic acid, plasma #2   Result Value Ref Range    Lactate (Lactic Acid) 2.1 0.5 - 2.2 mmol/L   Urinalysis, Reflex to Urine Culture Urine, Catheterized    Specimen: Urine   Result Value Ref Range    Specimen UA Urine, Catheterized     Color, UA Yellow Yellow, Straw, Elsa    Appearance, UA Clear Clear    pH, UA 7.0 5.0 - 8.0    Specific Gravity, UA 1.015 1.005 - 1.030    Protein, UA Negative Negative    Glucose, UA Negative Negative    Ketones, UA 1+ (A) Negative    Bilirubin (UA) Negative Negative    Occult Blood UA Negative Negative    Nitrite, UA Negative Negative    Urobilinogen, UA Negative <2.0 EU/dL    Leukocytes, UA Negative Negative   Troponin I   Result Value Ref Range    Troponin I 0.024 0.000 - 0.026 ng/mL   Procalcitonin   Result Value Ref Range    Procalcitonin 1.61 (H) <0.25 ng/mL   Brain natriuretic peptide   Result Value Ref Range     (H) 0 - 99 pg/mL         Imaging Results:  Imaging Results              CT Chest Abdomen Pelvis With Contrast (xpd) (Final result)  Result time 01/21/23 20:42:07      Final result by Salas Bassett MD (01/21/23  20:42:07)                   Impression:      Senescent changes.  No definite acute process seen    Distended gallbladder with intra and extrahepatic biliary dilatation.  Small noncalcified gallstone.    Mild atherosclerotic changes.    Moderate amount of stool in the colon    Small noncalcified gallstone    Right ovarian cystic structure    Severe compression deformity of the L2    Remaining findings as above    All CT scans   are performed using dose optimization techniques including the following: automated exposure control; adjustment of the mA and/or kV; use of iterative reconstruction technique.  Dose modulation was employed for ALARA by means of: Automated exposure control; adjustment of the mA and/or kV according to patient size (this includes techniques or standardized protocols for targeted exams where dose is matched to indication/reason for exam; i.e. extremities or head); and/or use of iterative reconstructive technique.      Electronically signed by: Serjio Marina  Date:    01/21/2023  Time:    20:42               Narrative:    EXAMINATION:  CT CHEST ABDOMEN PELVIS WITH CONTRAST (XPD)    CLINICAL HISTORY:  Sepsis;    TECHNIQUE:  Low dose axial images, sagittal and coronal reformations were obtained from the thoracic inlet to the pubic symphysis following the IV administration of 75 mL of Omnipaque 350    COMPARISON:  None    FINDINGS:  Mild reticulonodular opacities in the lung bases.  Heart and great vessels have a normal on gated appearance.  No evidence for mediastinal hematoma.  No adenopathy.  No definite fractures.    No solid organ injury.  Distended gallbladder.  Intra and extrahepatic biliary dilatation.  Cardiomegaly with atherosclerotic changes.  Mild subsegmental atelectasis.  Atheroma involving the descending aorta.  Enlarged right ovarian structure measuring up to 54 mm.  Spleen pancreas adrenal glands gallbladder liver have a normal appearance.  No bowel obstruction free fluid or  adenopathy.  Right-sided intrathecal lead with battery on the right side identified.  Severe compression deformity of L2 vertebral similar to prior exam.  Mild anterolisthesis of L4 with respect L5.  Small noncalcified gallstone.  Visualized uterus is grossly unremarkable.                                       X-Ray Chest AP Portable (Final result)  Result time 01/21/23 16:48:18      Final result by Salas Bassett MD (01/21/23 16:48:18)                   Impression:      As above      Electronically signed by: Serjio Marina  Date:    01/21/2023  Time:    16:48               Narrative:    EXAMINATION:  XR CHEST AP PORTABLE    CLINICAL HISTORY:  Sepsis;    TECHNIQUE:  Single frontal view of the chest was performed.    COMPARISON:  None    FINDINGS:  No pneumonic consolidation, pleural effusion or pneumothorax.    Cardiomegaly.  Tortuous aorta.  Senescent changes.    Bones are intact.                                       The EKG was ordered, reviewed, and independently interpreted by the ED provider.  Interpretation time: 16:42  Rate: 145 BPM  Rhythm:  Sinus tachycardia with Premature supraventricular complexes and Fusion complexes   Interpretation: Possible Anterior infarct. No STEMI.           The Emergency Provider reviewed the vital signs and test results, which are outlined above.     ED Discussion     7:05 PM: After potassium pill pt felt pill caught in throat. Attempting glucagon and GI cocktail.     7:57 PM: Dr. Carrington transfers care of patient to Dr. Stafford pending lab and imaging results.    8:32 PM: Re-evaluated pt. Pt is extremely lethargic, but arousable. Family is concerned about the pt being discharged to assisted living facility at sunrise. Pt is elderly.    8:57 PM: Discussed pt's case with Dr. Butler (General Surgery) who recommends a HIDA. She advises if the pt is positive for cholecystitis, consult interventional radiology for percutaneous cholecystomstomy tube placement.    9:27 PM: Discussed  "case with Ramirez Leonardo NP ([facility]). Dr. Leonardo agrees with current care and management of pt and accepts admission.   Admitting Service: Hospital Medicine  Admitting Physician: Dr. Leonardo  Admit to: Med/tele    9:27 PM: Re-evaluated pt. I have discussed test results, shared treatment plan, and the need for admission with patient and family at bedside. Pt and family express understanding at this time and agree with all information. All questions answered. Pt and family have no further questions or concerns at this time. Pt is ready for admit.           Medical Decision Making:   History:   Old Medical Records: I decided to obtain old medical records.  Initial Assessment:   89 yo female with severe weakness, abnormal vital signs, SIRS  Differential Diagnosis:   Sepsis, Pneumonia, Peritonitis, Dehydration, Cholecystitis  Independently Interpreted Test(s):   I have ordered and independently interpreted EKG Reading(s) - see prior notes  Clinical Tests:   Lab Tests: Ordered and Reviewed  Radiological Study: Ordered and Reviewed  Medical Tests: Ordered and Reviewed  Sepsis Perfusion Assessment: "I attest a sepsis perfusion exam was performed within 6 hours of sepsis, severe sepsis, or septic shock presentation, following fluid resuscitation."  ED Management:  IV fluids, IV antibiotics, Surgery Consult, Admission to Hospital Medicine  Other:   I have discussed this case with another health care provider.       <> Summary of the Discussion: Surgical Consult-Obtain HIDA scan, rule out Cholecystitis, may need IR procedure if positive         ED Medication(s):  Medications   potassium chloride SA CR tablet 40 mEq (40 mEq Oral Not Given 1/21/23 1909)   ceFEPIme (MAXIPIME) 2 g in dextrose 5 % in water (D5W) 5 % 50 mL IVPB (MB+) (2 g Intravenous New Bag 1/21/23 2108)   vancomycin - pharmacy to dose (has no administration in time range)   vancomycin (VANCOCIN) 1,000 mg in dextrose 5 % 250 mL IVPB (Vial-Mate) (has " no administration in time range)   lactated ringers bolus 1,000 mL (0 mLs Intravenous Stopped 1/21/23 2022)   metoprolol injection 5 mg (5 mg Intravenous Given 1/21/23 1858)   glucagon (human recombinant) injection 1 mg (1 mg Intravenous Given 1/21/23 1924)   aluminum-magnesium hydroxide-simethicone 200-200-20 mg/5 mL suspension 30 mL (30 mLs Oral Given 1/21/23 1942)     And   LIDOcaine HCl 2% oral solution 15 mL (15 mLs Oral Given 1/21/23 1944)   iohexoL (OMNIPAQUE 350) injection 75 mL (75 mLs Intravenous Given 1/21/23 2013)       New Prescriptions    No medications on file               Scribe Attestation:   Scribe #1: I performed the above scribed service and the documentation accurately describes the services I performed. I attest to the accuracy of the note.     Attending:   Physician Attestation Statement for Scribe #1: I, Anthony Carrington MD, personally performed the services described in this documentation, as scribed by Amanda Edge, in my presence, and it is both accurate and complete.       Scribe Attestation:   Scribe #2: I performed the above scribed service and the documentation accurately describes the services I performed. I attest to the accuracy of the note.    Attending Attestation:           Physician Attestation for Scribe:    Physician Attestation Statement for Scribe #2: I, Evan Stafford MD, reviewed documentation, as scribed by Valeriano Bourne in my presence, and it is both accurate and complete. I also acknowledge and confirm the content of the note done by Scribe #1.         Clinical Impression       ICD-10-CM ICD-9-CM   1. SIRS (systemic inflammatory response syndrome)  R65.10 995.90   2. Tachycardia  R00.0 785.0   3. Abnormal CT scan, gallbladder  R93.2 793.3   4. Weakness  R53.1 780.79   5. Leukocytosis, unspecified type  D72.829 288.60       Disposition:   Disposition: Admitted  Condition: Serious       Evan Stafford MD  01/21/23 8652

## 2023-01-22 NOTE — PLAN OF CARE
[facility] - Med Surg  Initial Discharge Assessment       Primary Care Provider: Marcie Espinal MD    Admission Diagnosis: Hypokalemia [E87.6]  Weakness [R53.1]  Tachycardia [R00.0]  SIRS (systemic inflammatory response syndrome) [R65.10]  Generalized weakness [R53.1]  Chest pain [R07.9]  Abnormal CT scan, gallbladder [R93.2]  Leukocytosis, unspecified type [D72.829]    Admission Date: 1/21/2023  Expected Discharge Date:     Discharge Barriers Identified: None    Payor: [employer] MEDICARE / Plan: [employer] MEDICARE HMO / Product Type: Capitation /     Extended Emergency Contact Information  Primary Emergency Contact: Herve Olsen   [facility]  Home Phone: [phone]  Mobile Phone: [phone]  Relation: Son   needed? No  Secondary Emergency Contact: Boni Odom  Mobile Phone: [phone]  Relation: Neighbor  Preferred language: English   needed? No    Discharge Plan A: Assisted Living  Discharge Plan B: Home Health      [facility] [facility] [place] [address]  [address]  [place] [address]  Phone: [phone] Fax: [phone]    [id number] - [place] - [address] [facility] [address]  [address]  [place] [address]  Phone: [phone] Fax: [phone]    [facility] - [place], [place] - [address]  [address] [address]  Phone: [phone] Fax: [phone]    [facility] - [place] - [address]  [address]  [place] [address]  Phone: [phone] Fax: [phone]    [facility] [place] - [address]  [address]  [place] [address]  Phone: [phone] Fax: [phone]      Initial Assessment (most recent)       Adult Discharge Assessment - 01/22/23 1607          Discharge Assessment    Assessment Type Discharge  Planning Assessment     Confirmed/corrected address, phone number and insurance Yes     Confirmed Demographics Correct on Facesheet     Source of Information patient     Does patient/caregiver understand observation status --   na    Communicated JULIANA with patient/caregiver Date not available/Unable to determine     Reason For Admission generalized weakness     Do you expect to return to your current living situation? Yes     Do you have help at home or someone to help you manage your care at home? Yes     Who are your caregiver(s) and their phone number(s)? facility staff     Prior to hospitilization cognitive status: Alert/Oriented     Current cognitive status: Alert/Oriented     Walking or Climbing Stairs ambulation difficulty, requires equipment     Mobility Management ues whellchair and walker     Dressing/Bathing bathing difficulty, assistance 1 person     Dressing[facility]Bathing Management [facility]     Do you have any problems with: --   assisted living facility    Home Accessibility wheelchair accessible     Equipment Currently Used at Home walker, rolling;wheelchair     Readmission within 30 days? No     Patient currently being followed by outpatient case management? No     Do you currently have service(s) that help you manage your care at home? No     Do you take prescription medications? Yes     Do you have prescription coverage? Yes     Do you have any problems affording any of your prescribed medications? No     Is the patient taking medications as prescribed? yes     How do you get to doctors appointments? family or friend will provide     Are you on dialysis? No     Do you take coumadin? No     Discharge Plan A Assisted Living     Discharge Plan B Home Health     DME Needed Upon Discharge  --   to be determined    Discharge Plan discussed with: Patient     Discharge Barriers Identified None                      Lives at [facility]

## 2023-01-22 NOTE — H&P
Formerly [facility], [facility] - Emergency Dept.  [facility] Medicine  History & Physical    Patient Name: Francie Olsen  MRN: [id number]  Patient Class: IP- Inpatient  Admission Date: 1/21/2023  Attending Physician: Avni Leonardo MD  Primary Care Provider: Marcie Espinal MD         Patient information was obtained from patient, past medical records and ER records.     Subjective:     Principal Problem:Generalized weakness    Chief Complaint:   Chief Complaint   Patient presents with    Fatigue     Fatigue and cough         HPI: Francie Olsen is a 89 y.o. female with a PMH  has a past medical history of Acute blood loss anemia (11/24/2021), Chronic low back pain, Chronic mid back pain, Familial tremor, Hearing loss, Hyperlipemia, Hypertension, Osteoarthritis, Osteoporosis, unspecified, SCC (squamous cell carcinoma), hand (2015), and Squamous cell carcinoma (9-10yrs ago).  Presents to the ER for evaluation of generalized weakness/fatigue.  Patient from assisted living facility and staff at the facility noted patient's temperature to be 2° higher than her baseline temperature .  Patient's complaint this time is fatigue, non-productive cough and chronic back pain.  History of compression fractures and thoracic and lumbar region.  Otherwise, patient voices no complaints at this time.      ER workup revealed leukocytosis 14.8, potassium of 3.3, CBG 2119 mg/dL, , program of 1.61, chest x-ray cardiomegaly, flu/COVID negative, UA negative, initial EKG shows sinus tachycardia with a ventricular the rate of 145 beats per minute with a QT/QTC of 336/521.  Repeat EKG after IV fluids and treatment revealed sinus rhythm with first-degree AV block with PVCs with ventricular rate of 77 beats per minute.  QT/ QTC 4/452.  ER for fall CT of chest and abdomen/pelvis with contrast due to meeting SIRS criteria. Imaging revealed:[Distended gallbladder with intra and extrahepatic biliary dilatation.  Small noncalcified gallstone].  General surgery  consult and recommended HIDA scan.  If positive consult IR for possible michelle tube (per secure chat from ER provider Dr. Stafford).  Hospital Medicine consulted to admit patient for HIDA scan and further workup.  Patient will be under inpatient status.  PCP: Marcie Espinal          Past Medical History:   Diagnosis Date    Acute blood loss anemia 11/24/2021    Chronic low back pain     Chronic mid back pain     Familial tremor     Hearing loss     Hyperlipemia     Hypertension     Osteoarthritis     Osteoporosis, unspecified     SCC (squamous cell carcinoma), hand 2015    hand    Squamous cell carcinoma 9-10yrs ago    left ear       Past Surgical History:   Procedure Laterality Date    CATARACT EXTRACTION      FEMUR FRACTURE SURGERY Right     HIP FRACTURE SURGERY Left aprox 2010    implant for nerve pain      YAG OU         Review of patient's allergies indicates:   Allergen Reactions    Codeine Nausea And Vomiting and Nausea Only    Meperidine Nausea And Vomiting and Nausea Only       No current facility-administered medications on file prior to encounter.     Current Outpatient Medications on File Prior to Encounter   Medication Sig    melatonin 5 mg Cap Take by mouth.    metoprolol tartrate (LOPRESSOR) 25 MG tablet Take 1 tablet (25 mg total) by mouth 2 (two) times daily.    midodrine (PROAMATINE) 2.5 MG Tab TAKE 1 TAB BY MOUTH THREE TIMES DAILY    naloxegoL (MOVANTIK) 25 mg tablet Take 1 tablet by mouth once a day as needed for pain    oxyCODONE (ROXICODONE) 15 MG Tab Take 1 tablet by mouth twice a day as needed for pain     Family History       Problem Relation (Age of Onset)    Cancer Mother    Diabetes Son          Tobacco Use    Smoking status: Former    Smokeless tobacco: Never   Substance and Sexual Activity    Alcohol use: No     Alcohol/week: 0.0 standard drinks    Drug use: No    Sexual activity: Never     Birth control/protection: Post-menopausal     Review of Systems    Constitutional:  Positive for fatigue. Negative for chills, diaphoresis and fever.   Respiratory:  Positive for cough. Negative for chest tightness and shortness of breath.    Cardiovascular:  Negative for chest pain, palpitations and leg swelling.   Gastrointestinal:  Negative for abdominal pain, diarrhea, nausea and vomiting.   Musculoskeletal:  Positive for back pain (chronic). Negative for neck pain and neck stiffness.   Neurological:  Negative for weakness (generalized).   All other systems reviewed and are negative.  Objective:     Vital Signs (Most Recent):  Temp: 99.4 °F (37.4 °C) (01/21/23 2130)  Pulse: 102 (01/22/23 0700)  Resp: 20 (01/22/23 0700)  BP: (!) 157/79 (01/22/23 0700)  SpO2: 95 % (01/22/23 0700)   Vital Signs (24h Range):  Temp:  [98.3 °F (36.8 °C)-99.4 °F (37.4 °C)] 99.4 °F (37.4 °C)  Pulse:  [] 102  Resp:  [17-20] 20  SpO2:  [93 %-96 %] 95 %  BP: (105-179)/(63-92) 157/79     Weight: 41.7 kg (92 lb)  Body mass index is 16.3 kg/m².    Physical Exam  Vitals and nursing note reviewed.   Constitutional:       General: She is awake. She is not in acute distress.     Appearance: Normal appearance. She is not ill-appearing, toxic-appearing or diaphoretic.      Comments: Elderly appearing female resting comfortably in bed.   HENT:      Head: Normocephalic and atraumatic.      Mouth/Throat:      Lips: Pink.      Mouth: Mucous membranes are moist.      Pharynx: Oropharynx is clear. Uvula midline.   Eyes:      Extraocular Movements: Extraocular movements intact.   Cardiovascular:      Rate and Rhythm: Normal rate and regular rhythm.      Pulses:           Radial pulses are 2+ on the right side and 2+ on the left side.        Dorsalis pedis pulses are 2+ on the right side and 2+ on the left side.      Heart sounds: Normal heart sounds. No murmur heard.  Pulmonary:      Effort: Pulmonary effort is normal.      Breath sounds: Decreased breath sounds present.   Abdominal:      General: Bowel sounds  are normal.      Palpations: Abdomen is soft.      Tenderness: There is no abdominal tenderness.   Musculoskeletal:      Cervical back: Normal range of motion and neck supple.      Right lower leg: No edema.      Left lower leg: No edema.      Comments: RUTHY   Skin:     General: Skin is warm and dry.   Neurological:      Mental Status: She is alert. Mental status is at baseline.      GCS: GCS eye subscore is 4. GCS verbal subscore is 5. GCS motor subscore is 6.   Psychiatric:         Speech: Speech normal.         Behavior: Behavior is cooperative.          LABS:  Recent Results (from the past 24 hour(s))   Influenza A & B by Molecular    Collection Time: 01/21/23  4:44 PM    Specimen: Nasopharyngeal Swab   Result Value Ref Range    Influenza A, Molecular Negative Negative    Influenza B, Molecular Negative Negative    Flu A & B Source Nasal swab    Blood culture x two cultures. Draw prior to antibiotics.    Collection Time: 01/21/23  4:58 PM    Specimen: Peripheral, Forearm, Right; Blood   Result Value Ref Range    Blood Culture, Routine No growth to date    CBC auto differential    Collection Time: 01/21/23  4:59 PM   Result Value Ref Range    WBC 14.80 (H) 3.90 - 12.70 K/uL    RBC 4.16 4.00 - 5.40 M/uL    Hemoglobin 11.4 (L) 12.0 - 16.0 g/dL    Hematocrit 34.9 (L) 37.0 - 48.5 %    MCV 84 82 - 98 fL    MCH 27.4 27.0 - 31.0 pg    MCHC 32.7 32.0 - 36.0 g/dL    RDW 16.5 (H) 11.5 - 14.5 %    Platelets 249 150 - 450 K/uL    MPV 10.3 9.2 - 12.9 fL    Immature Granulocytes 0.5 0.0 - 0.5 %    Gran # (ANC) 13.7 (H) 1.8 - 7.7 K/uL    Immature Grans (Abs) 0.08 (H) 0.00 - 0.04 K/uL    Lymph # 0.4 (L) 1.0 - 4.8 K/uL    Mono # 0.6 0.3 - 1.0 K/uL    Eos # 0.0 0.0 - 0.5 K/uL    Baso # 0.04 0.00 - 0.20 K/uL    nRBC 0 0 /100 WBC    Gran % 92.6 (H) 38.0 - 73.0 %    Lymph % 2.8 (L) 18.0 - 48.0 %    Mono % 3.8 (L) 4.0 - 15.0 %    Eosinophil % 0.0 0.0 - 8.0 %    Basophil % 0.3 0.0 - 1.9 %    Differential Method Automated     Comprehensive metabolic panel    Collection Time: 01/21/23  4:59 PM   Result Value Ref Range    Sodium 136 136 - 145 mmol/L    Potassium 3.3 (L) 3.5 - 5.1 mmol/L    Chloride 102 95 - 110 mmol/L    CO2 21 (L) 23 - 29 mmol/L    Glucose 119 (H) 70 - 110 mg/dL    BUN 10 10 - 30 mg/dL    Creatinine 0.6 0.5 - 1.4 mg/dL    Calcium 8.7 8.7 - 10.5 mg/dL    Total Protein 7.0 6.0 - 8.4 g/dL    Albumin 3.4 (L) 3.5 - 5.2 g/dL    Total Bilirubin 1.0 0.1 - 1.0 mg/dL    Alkaline Phosphatase 63 55 - 135 U/L    AST 16 10 - 40 U/L    ALT 11 10 - 44 U/L    Anion Gap 13 8 - 16 mmol/L    eGFR >60 >60 mL/min/1.73 m^2   Lactic acid, plasma #1    Collection Time: 01/21/23  4:59 PM   Result Value Ref Range    Lactate (Lactic Acid) 1.4 0.5 - 2.2 mmol/L   Troponin I    Collection Time: 01/21/23  4:59 PM   Result Value Ref Range    Troponin I 0.024 0.000 - 0.026 ng/mL   Procalcitonin    Collection Time: 01/21/23  4:59 PM   Result Value Ref Range    Procalcitonin 1.61 (H) <0.25 ng/mL   Brain natriuretic peptide    Collection Time: 01/21/23  4:59 PM   Result Value Ref Range     (H) 0 - 99 pg/mL   Blood culture x two cultures. Draw prior to antibiotics.    Collection Time: 01/21/23  5:31 PM    Specimen: Peripheral, Forearm, Left; Blood   Result Value Ref Range    Blood Culture, Routine No growth to date    Urinalysis, Reflex to Urine Culture Urine, Catheterized    Collection Time: 01/21/23  6:52 PM    Specimen: Urine   Result Value Ref Range    Specimen UA Urine, Catheterized     Color, UA Yellow Yellow, Straw, Elsa    Appearance, UA Clear Clear    pH, UA 7.0 5.0 - 8.0    Specific Gravity, UA 1.015 1.005 - 1.030    Protein, UA Negative Negative    Glucose, UA Negative Negative    Ketones, UA 1+ (A) Negative    Bilirubin (UA) Negative Negative    Occult Blood UA Negative Negative    Nitrite, UA Negative Negative    Urobilinogen, UA Negative <2.0 EU/dL    Leukocytes, UA Negative Negative   Lactic acid, plasma #2    Collection Time:  01/21/23  8:38 PM   Result Value Ref Range    Lactate (Lactic Acid) 2.1 0.5 - 2.2 mmol/L       RADIOLOGY  X-Ray Chest AP Portable    Result Date: 1/21/2023  EXAMINATION: XR CHEST AP PORTABLE CLINICAL HISTORY: Sepsis; TECHNIQUE: Single frontal view of the chest was performed. COMPARISON: None FINDINGS: No pneumonic consolidation, pleural effusion or pneumothorax. Cardiomegaly.  Tortuous aorta.  Senescent changes. Bones are intact.     As above Electronically signed by: Serjio Marina Date:    01/21/2023 Time:    16:48    CT Chest Abdomen Pelvis With Contrast (xpd)    Result Date: 1/21/2023  EXAMINATION: CT CHEST ABDOMEN PELVIS WITH CONTRAST (XPD) CLINICAL HISTORY: Sepsis; TECHNIQUE: Low dose axial images, sagittal and coronal reformations were obtained from the thoracic inlet to the pubic symphysis following the IV administration of 75 mL of Omnipaque 350 COMPARISON: None FINDINGS: Mild reticulonodular opacities in the lung bases.  Heart and great vessels have a normal on gated appearance.  No evidence for mediastinal hematoma.  No adenopathy.  No definite fractures. No solid organ injury.  Distended gallbladder.  Intra and extrahepatic biliary dilatation.  Cardiomegaly with atherosclerotic changes.  Mild subsegmental atelectasis.  Atheroma involving the descending aorta.  Enlarged right ovarian structure measuring up to 54 mm.  Spleen pancreas adrenal glands gallbladder liver have a normal appearance.  No bowel obstruction free fluid or adenopathy.  Right-sided intrathecal lead with battery on the right side identified.  Severe compression deformity of L2 vertebral similar to prior exam.  Mild anterolisthesis of L4 with respect L5.  Small noncalcified gallstone.  Visualized uterus is grossly unremarkable.     Senescent changes.  No definite acute process seen Distended gallbladder with intra and extrahepatic biliary dilatation.  Small noncalcified gallstone. Mild atherosclerotic changes. Moderate amount of stool in  the colon Small noncalcified gallstone Right ovarian cystic structure Severe compression deformity of the L2 Remaining findings as above All CT scans   are performed using dose optimization techniques including the following: automated exposure control; adjustment of the mA and/or kV; use of iterative reconstruction technique.  Dose modulation was employed for ALARA by means of: Automated exposure control; adjustment of the mA and/or kV according to patient size (this includes techniques or standardized protocols for targeted exams where dose is matched to indication/reason for exam; i.e. extremities or head); and/or use of iterative reconstructive technique. Electronically signed by: Serjio Marina Date:    01/21/2023 Time:    20:42      EKG    MICROBIOLOGY    MDM     Amount and/or Complexity of Data Reviewed  Clinical lab tests: reviewed  Tests in the radiology section of CPT®: reviewed  Tests in the medicine section of CPT®: reviewed  Discussion of test results with the performing providers: yes  Decide to obtain previous medical records or to obtain history from someone other than the patient: yes  Review and summarize past medical records: yes  Discuss the patient with other providers: yes  Independent visualization of images, tracings, or specimens: yes          Assessment/Plan:     * Generalized weakness  WBC of 14.8. Elevation likely 2/2 due to intrabdominal infection/inflammatory process/acute findings of distending GB w/biliary dilation vs unknown infections process. Initiated on 2g cefepime in ED. Flu/covid negative. procal of 1.61. UA wnl. CXR shows cardiomegaly. VSS.  Plan:  -continue cefepime 2g q12hrs  -f/u blood cultures  -gentle IVFs  -f/u HIDA scan results  -monitor labs      Leukocytosis  WBC of 14.8. Elevation likely 2/2 due to intrabdominal infection/inflammatory process/acute findings of distending GB w/biliary dilation vs unknown infections process. Initiated on 2g cefepime in ED. Flu/covid  negative. procal of 1.61. UA wnl. CXR shows cardiomegaly.  Plan:  -continue cefepime 2g q12hrs  -f/u blood cultures  -gentle IVFs  -f/u HIDA scan results  -monitor labs    Abnormal finding on CT scan  Findings of [distended gallbladder with intra and extrahepatic biliary dilatation.  Small noncalcified gallstone].  General surgery consult and recommended HIDA scan.  If positive consult IR for possible Jeniffer tube.  Plan:  -HIDA scan in am  -GenSurg consult  -IR consult if HIDA scan positive      Hypokalemia  Patient has hypokalemia which is currently treated with po potassium in ED.. Last electrolytes reviewed- Recent Labs   Lab 01/21/23  1659   K 3.3*   . Will replace potassium and monitor electrolytes closely.         Irregular heart rhythm  Evaluated by PCP (Odilia Cueto NP) on 1/18/23. Per note from visit:    Irregular heart rhythm: Current /78, heart rate 72 and regular. Currently RRR with controlled rate.  Resident refusing to take BB and midodrin as she reports she doesn't need these meds any more.  Had long discussion with resident, explained what medications are used for, explained why she was started on these medications and resident remains adamant she will not take these medications.    (H/O: In SVT in ambulance November 2022 - spontaneously converted.   Initiated on low dose BB at [facility]  Seen 12/7 by cardiology - initiated on Midodrine TID)   Plan:  -continue home medications (midodrine and metoprolol)  -tele monitoring      Chronic low back pain  Chronic low back pain with sciatica, sciatica laterality unspecified, unspecified back pain laterality   History of fall on 11/19/22 with- multiple compression fractures, at that time she was seen by Ortho and felt no surgical interventions but would be ordered therapy and medicated for pain. Resident is now seen by pain management doctor.  Oxycodone 15mg BID PRN for pain;   Followed by pain management - Dr. Gregg Zimmerman at [facility]        Drug induced  constipation  -Movantik 25 mg QD PRN  -Colace 100mg QD PRN      Primary insomnia  -5mg melatonin qHS prn      VTE Risk Mitigation (From admission, onward)         Ordered     IP VTE HIGH RISK PATIENT  Once         01/21/23 2201     Place sequential compression device  Until discontinued         01/21/23 2201               //Core Measures   -DVT proph: SCDs, withholding anticoagulation pending surgical intervention   -Code status full    -Surrogate: none      Components of this note were documented using a voice recognition system and are subject to errors not corrected at the time the document was proof read. Please contact the author for any clarifications.       Ramirez Leonardo NP  Department of Hospital Medicine   [facility] - Emergency Dept.

## 2023-01-22 NOTE — CONSULTS
General Surgery Consultation    Francie Olsen  1927  [id number]    Date of consultation: 2023    Reason: Gallbladder distention    HPI: This is a 89 y.o. y/o female who presented due to fatigue and generalized weakness. Patient states she fell at home. She denies abdominal pain, bloody stools, nausea, vomiting, or diarrhea. Workup demonstrated gallbladder distention on CT scan.        Past Medical History:   Diagnosis Date    Acute blood loss anemia 2021    Chronic low back pain     Chronic mid back pain     Familial tremor     Hearing loss     Hyperlipemia     Hypertension     Osteoarthritis     Osteoporosis, unspecified     Primary insomnia 2023    SCC (squamous cell carcinoma), hand 2015    hand    Squamous cell carcinoma 9-10yrs ago    left ear       Past Surgical History:   Procedure Laterality Date    CATARACT EXTRACTION      FEMUR FRACTURE SURGERY Right     HIP FRACTURE SURGERY Left aprox 2010    implant for nerve pain      YAG OU         Family History   Problem Relation Age of Onset    Cancer Mother          54 uterine    Diabetes Son     Blindness Neg Hx     Cataracts Neg Hx     Glaucoma Neg Hx     Hypertension Neg Hx     Macular degeneration Neg Hx     Retinal detachment Neg Hx     Strabismus Neg Hx     Stroke Neg Hx     Thyroid disease Neg Hx     Melanoma Neg Hx        Social History     Socioeconomic History    Marital status:    Tobacco Use    Smoking status: Former    Smokeless tobacco: Never   Substance and Sexual Activity    Alcohol use: No     Alcohol/week: 0.0 standard drinks    Drug use: No    Sexual activity: Never     Birth control/protection: Post-menopausal   Other Topics Concern    Are you pregnant or think you may be? No    Breast-feeding No       Review of patient's allergies indicates:   Allergen Reactions    Codeine Nausea And Vomiting and Nausea Only    Meperidine Nausea And Vomiting and Nausea Only         Current Facility-Administered Medications:      0.9%  NaCl infusion, , Intravenous, Continuous, Ramirez Leonardo NP, Last Rate: 50 mL/hr at 01/21/23 2221, New Bag at 01/21/23 2221    acetaminophen tablet 650 mg, 650 mg, Oral, Q4H PRN, Ramirez Leonardo NP    aluminum-magnesium hydroxide-simethicone 200-200-20 mg/5 mL suspension 30 mL, 30 mL, Oral, QID PRN, Ramirez Leonardo NP    ceFEPIme (MAXIPIME) 2 g in dextrose 5 % in water (D5W) 5 % 50 mL IVPB (MB+), 2 g, Intravenous, Q12H, Ramirez Leonardo NP, Stopped at 01/22/23 0858    dextrose 10% bolus 125 mL 125 mL, 12.5 g, Intravenous, PRN, Ramirez Leonardo NP    dextrose 10% bolus 250 mL 250 mL, 25 g, Intravenous, PRN, Ramirez Leonardo NP    docusate sodium capsule 100 mg, 100 mg, Oral, Daily PRN, Ramirez Leonardo NP    glucagon (human recombinant) injection 1 mg, 1 mg, Intramuscular, PRN, Ramirez Leonardo NP    glucose chewable tablet 16 g, 16 g, Oral, PRN, Ramirez Leonardo NP    glucose chewable tablet 24 g, 24 g, Oral, PRN, Ramirez Leonardo NP    melatonin tablet 6 mg, 6 mg, Oral, Nightly PRN, Ramirez Leonardo NP    metoprolol tartrate (LOPRESSOR) tablet 25 mg, 25 mg, Oral, BID, Ramirez Leonardo NP, 25 mg at 01/22/23 0829    midodrine tablet 2.5 mg, 2.5 mg, Oral, TID WM, Ramirez Leonardo NP    naloxone 0.4 mg/mL injection 0.02 mg, 0.02 mg, Intravenous, PRN, Ramirez Leonardo NP    oxyCODONE immediate release tablet 15 mg, 15 mg, Oral, BID PRN, Ramirez Leonardo NP    simethicone chewable tablet 80 mg, 1 tablet, Oral, QID PRN, Ramirez Leonardo NP    sodium chloride 0.9% flush 10 mL, 10 mL, Intravenous, Q12H PRN, Ramirez Leonardo, LUIS ENRIQUE    Review of Systems:  Constitutional: +Malaise, fatigue. Denies fever, chills, unexpected weight loss  Eyes: Denies eye pain, eye discharge, sudden vision changes  ENT: Denies sore throat, ear pain, nasal drainage  Respiratory: Denies cough, SOB, hemoptysis, wheezing  Cardiovascular: Denies chest pain, palpitations,   Gastrointestinal: Denies abdominal  pain, nausea, vomiting, diarrhea  Genitourinary: Denies hematuria  Hematologic/Lymphatic: Denies easy bruising or lymphadenopathy  Musculoskeletal: Denies neck rigidity, joint swelling, new traumatic injuries.  Neurological: Denies focal weakness, numbness/tingling, seizures, LOC.  Behavioral/Psych: Denies hallucinations, suicidal and homicidal ideation.  Endocrine: Denies polydipsia, cold intolerance, heat intolerance      Physical exam:  Vital Signs (Most Recent):  Temp: 97.6 °F (36.4 °C) (01/22/23 1224)  Pulse: 80 (01/22/23 1224)  Resp: 18 (01/22/23 1224)  BP: 127/70 (01/22/23 1224)  SpO2: 96 % (01/22/23 1224) Vital Signs (24h Range):  Temp:  [97.6 °F (36.4 °C)-99.4 °F (37.4 °C)] 97.6 °F (36.4 °C)  Pulse:  [] 80  Resp:  [17-22] 18  SpO2:  [93 %-97 %] 96 %  BP: (105-179)/(63-94) 127/70   Weight: 44.5 kg (98 lb 1.7 oz)  Body mass index is 17.38 kg/m².      Gen: Alert, awake, NAD, pleasant, non-toxic appearing  HENT: Atraumatic, normocephalic, mucus membranes moist, hard of hearing  Neck: No nuchal rigidity, no masses, trachea midline  Cardio: RRR  Resp: CTA BL, no wheezes, no stridor, no respiratory distress  Abdomen: Soft, nontender, nondistended, no RUQ tenderness  Extremities: No cyanosis, no deformity, no edema  Vascular: No gangrene, no pallor, cap refill <2s  Neuro: AAOx3, no focal deficits, PERRL  Psych: Normal affect      I have reviewed all pertinent lab results within the past 24 hours.  CBC:   Recent Labs   Lab 01/22/23 0827   WBC 11.19   RBC 3.87*   HGB 10.7*   HCT 32.9*      MCV 85   MCH 27.6   MCHC 32.5     CMP:   Recent Labs   Lab 01/22/23 0827   GLU 90   CALCIUM 8.9   ALBUMIN 3.0*   PROT 6.4      K 3.6   CO2 23      BUN 9*   CREATININE 0.5   ALKPHOS 61   ALT 9*   AST 14   BILITOT 1.1*       I have reviewed all pertinent imaging results/findings within the past 24 hours.    Assessment & Plan:  Patient Active Problem List   Diagnosis    Hypertension    Hyperlipemia     Familial tremor    DDD (degenerative disc disease), thoracic    Osteoporosis    Macular degeneration    Bilateral hearing loss    History of SCC (squamous cell carcinoma) of skin    Occipital neuralgia of right side /s/p pain stimulator implant    Tortuous aorta    Dry eye    Pseudophakia    Refractive error    Chronic low back pain    Abnormal weight loss    Intermediate stage nonexudative age-related macular degeneration of both eyes    Corneal edema of right eye    Fall    Closed 2-part displaced fracture of surgical neck of left humerus    Acute blood loss anemia    Generalized weakness    Fracture of multiple pubic rami, left, closed, initial encounter    Closed fracture of proximal end of left humerus with routine healing    Vitamin D deficiency    SVT (supraventricular tachycardia)    Compression fracture of body of thoracic vertebra    Elevated troponin    Postural dizziness with near syncope    Leukocytosis    Abnormal finding on CT scan    Hypokalemia    Irregular heart rhythm    Primary insomnia    Drug induced constipation       HIDA scan negative for acute cholecystitis. There is some biliary dilation and possible ductal obstruction per HIDA. MRCP has been ordered. Regardless, patient states that she does not want surgery and wishes to be DNR.    Thank you for this consultation. Will sign off.          Tanya Butler, DO  General Surgery  [facility] [place] [facility]  1/22/2023

## 2023-01-22 NOTE — SUBJECTIVE & OBJECTIVE
Interval History: Patient admitted by on call MD last night.  Patient was seen and examined by me this morning, no family at bedside.  She is resting comfortably, but awakened to verbal stimuli.  Awaiting HIDA scan this morning.      Review of Systems  Objective:     Vital Signs (Most Recent):  Temp: 99.4 °F (37.4 °C) (01/21/23 2130)  Pulse: (!) 123 (01/22/23 0829)  Resp: 20 (01/22/23 0700)  BP: (!) 151/77 (01/22/23 0829)  SpO2: 95 % (01/22/23 0700)   Vital Signs (24h Range):  Temp:  [98.3 °F (36.8 °C)-99.4 °F (37.4 °C)] 99.4 °F (37.4 °C)  Pulse:  [] 123  Resp:  [17-20] 20  SpO2:  [93 %-96 %] 95 %  BP: (105-179)/(63-92) 151/77     Weight: 41.7 kg (92 lb)  Body mass index is 16.3 kg/m².    Intake/Output Summary (Last 24 hours) at 1/22/2023 0845  Last data filed at 1/21/2023 2201  Gross per 24 hour   Intake 1050 ml   Output 450 ml   Net 600 ml      Physical Exam  Vitals and nursing note reviewed.   Constitutional:       Appearance: Normal appearance.   HENT:      Head: Normocephalic and atraumatic.      Nose: Nose normal.      Mouth/Throat:      Mouth: Mucous membranes are moist.   Eyes:      Extraocular Movements: Extraocular movements intact.      Conjunctiva/sclera: Conjunctivae normal.   Cardiovascular:      Rate and Rhythm: Normal rate and regular rhythm.      Pulses: Normal pulses.      Heart sounds: Normal heart sounds.   Pulmonary:      Effort: Pulmonary effort is normal.      Breath sounds: Normal breath sounds.   Abdominal:      General: Abdomen is flat. Bowel sounds are normal.      Palpations: Abdomen is soft.      Tenderness: There is abdominal tenderness. There is no guarding or rebound.      Hernia: No hernia is present.   Musculoskeletal:         General: Normal range of motion.      Cervical back: Normal range of motion and neck supple.   Skin:     General: Skin is warm.      Capillary Refill: Capillary refill takes less than 2 seconds.   Neurological:      General: No focal deficit present.       Mental Status: She is alert. Mental status is at baseline.   Psychiatric:         Mood and Affect: Mood normal.         Behavior: Behavior normal.       Significant Labs: All pertinent labs within the past 24 hours have been reviewed.  Recent Lab Results  (Last 5 results in the past 24 hours)        01/22/23  0827   01/21/23  2038   01/21/23  1852   01/21/23  1731   01/21/23  1659        Procalcitonin         1.61  Comment: A concentration < 0.25 ng/mL represents a low risk of bacterial   infection.  Procalcitonin may not be accurate among patients with localized   infection, recent trauma or major surgery, immunosuppressed state,   invasive fungal infection, renal dysfunction. Decisions regarding   initiation or continuation of antibiotic therapy should not be based   solely on procalcitonin levels.         Albumin         3.4       Alkaline Phosphatase         63       ALT         11       Anion Gap         13       Appearance, UA     Clear           AST         16       Baso # 0.03         0.04       Basophil % 0.3         0.3       Bilirubin (UA)     Negative           BILIRUBIN TOTAL         1.0  Comment: For infants and newborns, interpretation of results should be based  on gestational age, weight and in agreement with clinical  observations.    Premature Infant recommended reference ranges:  Up to 24 hours.............<8.0 mg/dL  Up to 48 hours............<12.0 mg/dL  3-5 days..................<15.0 mg/dL  6-29 days.................<15.0 mg/dL         Blood Culture, Routine       No growth to date  [P]         BNP         210  Comment: Values of less than 100 pg/ml are consistent with non-CHF populations.       BUN         10       Calcium         8.7       Chloride         102       CO2         21       Color, UA     Yellow           Creatinine         0.6       Differential Method Automated         Automated       eGFR         >60       Eos # 0.0         0.0       Eosinophil % 0.1         0.0        Glucose         119       Glucose, UA     Negative           Gran # (ANC) 9.8         13.7       Gran % 87.6         92.6       Hematocrit 32.9         34.9       Hemoglobin 10.7         11.4       Immature Grans (Abs) 0.05  Comment: Mild elevation in immature granulocytes is non specific and   can be seen in a variety of conditions including stress response,   acute inflammation, trauma and pregnancy. Correlation with other   laboratory and clinical findings is essential.           0.08  Comment: Mild elevation in immature granulocytes is non specific and   can be seen in a variety of conditions including stress response,   acute inflammation, trauma and pregnancy. Correlation with other   laboratory and clinical findings is essential.         Immature Granulocytes 0.4         0.5       Ketones, UA     1+           Lactate, Yemi   2.1  Comment: Falsely low lactic acid results can be found in samples   containing >=13.0 mg/dL total bilirubin and/or >=3.5 mg/dL   direct bilirubin.         1.4  Comment: Falsely low lactic acid results can be found in samples   containing >=13.0 mg/dL total bilirubin and/or >=3.5 mg/dL   direct bilirubin.         Leukocytes, UA     Negative           Lymph # 0.8         0.4       Lymph % 7.5         2.8       MCH 27.6         27.4       MCHC 32.5         32.7       MCV 85         84       Mono # 0.5         0.6       Mono % 4.1         3.8       MPV 10.2         10.3       NITRITE UA     Negative           nRBC 0         0       Occult Blood UA     Negative           pH, UA     7.0           Platelets 233         249       Potassium         3.3       PROTEIN TOTAL         7.0       Protein, UA     Negative  Comment: Recommend a 24 hour urine protein or a urine   protein/creatinine ratio if globulin induced proteinuria is  clinically suspected.             RBC 3.87         4.16       RDW 17.0         16.5       Sodium         136       Specific Ong, UA     1.015           Specimen UA      Urine, Catheterized           Troponin I         0.024  Comment: The reference interval for Troponin I represents the 99th percentile   cutoff   for our facility and is consistent with 3rd generation assay   performance.         UROBILINOGEN UA     Negative           WBC 11.19         14.80                               [P] - Preliminary Result               Significant Imaging: I have reviewed all pertinent imaging results/findings within the past 24 hours.    CT Chest Abdomen Pelvis With Contrast (xpd)   Final Result      Senescent changes.  No definite acute process seen      Distended gallbladder with intra and extrahepatic biliary dilatation.  Small noncalcified gallstone.      Mild atherosclerotic changes.      Moderate amount of stool in the colon      Small noncalcified gallstone      Right ovarian cystic structure      Severe compression deformity of the L2      Remaining findings as above      All CT scans   are performed using dose optimization techniques including the following: automated exposure control; adjustment of the mA and/or kV; use of iterative reconstruction technique.  Dose modulation was employed for ALARA by means of: Automated exposure control; adjustment of the mA and/or kV according to patient size (this includes techniques or standardized protocols for targeted exams where dose is matched to indication/reason for exam; i.e. extremities or head); and/or use of iterative reconstructive technique.         Electronically signed by: Serjio Marina   Date:    01/21/2023   Time:    20:42      X-Ray Chest AP Portable   Final Result      As above         Electronically signed by: Serjio Marina   Date:    01/21/2023   Time:    16:48      NM Hepatobiliary Scan (HIDA)    (Results Pending)

## 2023-01-22 NOTE — ASSESSMENT & PLAN NOTE
Patient has hypokalemia which is currently treated with po potassium in ED.. Last electrolytes reviewed- Recent Labs   Lab 01/21/23  1659   K 3.3*   . Will replace potassium and monitor electrolytes closely.

## 2023-01-22 NOTE — ASSESSMENT & PLAN NOTE
WBC of 14.8. Elevation likely 2/2 due to intrabdominal infection/inflammatory process/acute findings of distending GB w/biliary dilation vs unknown infections process. Initiated on 2g cefepime in ED. Flu/covid negative. procal of 1.61. UA wnl. CXR shows cardiomegaly. VSS.  Plan:  -continue cefepime 2g q12hrs  -f/u blood cultures  -gentle IVFs  -f/u HIDA scan results  -monitor labs

## 2023-01-22 NOTE — ASSESSMENT & PLAN NOTE
Patient has hypokalemia which is currently treated with po potassium in ED.. Last electrolytes reviewed-   Recent Labs   Lab 01/21/23  1659   K 3.3*   . Will replace potassium and monitor electrolytes closely.   - Received 40 meq in the ER and ordered an additional 40 meq this morning.

## 2023-01-22 NOTE — ASSESSMENT & PLAN NOTE
Currently normotensive.BP usually well controlled per patient with home medications.  Plan:  -Optimize pain control   -Continue home medications ( Lopressor 25mg bid), titrate as needed   -Monitor BP  -Low salt/cardiac diet when not NPO  -IV hydralazine prn for SBP>160 or DBP>90

## 2023-01-22 NOTE — SUBJECTIVE & OBJECTIVE
Past Medical History:   Diagnosis Date    Acute blood loss anemia 11/24/2021    Chronic low back pain     Chronic mid back pain     Familial tremor     Hearing loss     Hyperlipemia     Hypertension     Osteoarthritis     Osteoporosis, unspecified     SCC (squamous cell carcinoma), hand 2015    hand    Squamous cell carcinoma 9-10yrs ago    left ear       Past Surgical History:   Procedure Laterality Date    CATARACT EXTRACTION      FEMUR FRACTURE SURGERY Right     HIP FRACTURE SURGERY Left aprox 2010    implant for nerve pain      YAG OU         Review of patient's allergies indicates:   Allergen Reactions    Codeine Nausea And Vomiting and Nausea Only    Meperidine Nausea And Vomiting and Nausea Only       No current facility-administered medications on file prior to encounter.     Current Outpatient Medications on File Prior to Encounter   Medication Sig    melatonin 5 mg Cap Take by mouth.    metoprolol tartrate (LOPRESSOR) 25 MG tablet Take 1 tablet (25 mg total) by mouth 2 (two) times daily.    midodrine (PROAMATINE) 2.5 MG Tab TAKE 1 TAB BY MOUTH THREE TIMES DAILY    naloxegoL (MOVANTIK) 25 mg tablet Take 1 tablet by mouth once a day as needed for pain    oxyCODONE (ROXICODONE) 15 MG Tab Take 1 tablet by mouth twice a day as needed for pain     Family History       Problem Relation (Age of Onset)    Cancer Mother    Diabetes Son          Tobacco Use    Smoking status: Former    Smokeless tobacco: Never   Substance and Sexual Activity    Alcohol use: No     Alcohol/week: 0.0 standard drinks    Drug use: No    Sexual activity: Never     Birth control/protection: Post-menopausal     Review of Systems   Constitutional:  Positive for fatigue. Negative for chills, diaphoresis and fever.   Respiratory:  Positive for cough. Negative for chest tightness and shortness of breath.    Cardiovascular:  Negative for chest pain, palpitations and leg swelling.   Gastrointestinal:  Negative for abdominal pain, diarrhea, nausea  and vomiting.   Musculoskeletal:  Positive for back pain (chronic). Negative for neck pain and neck stiffness.   Neurological:  Negative for weakness (generalized).   All other systems reviewed and are negative.  Objective:     Vital Signs (Most Recent):  Temp: 99.4 °F (37.4 °C) (01/21/23 2130)  Pulse: 102 (01/22/23 0700)  Resp: 20 (01/22/23 0700)  BP: (!) 157/79 (01/22/23 0700)  SpO2: 95 % (01/22/23 0700)   Vital Signs (24h Range):  Temp:  [98.3 °F (36.8 °C)-99.4 °F (37.4 °C)] 99.4 °F (37.4 °C)  Pulse:  [] 102  Resp:  [17-20] 20  SpO2:  [93 %-96 %] 95 %  BP: (105-179)/(63-92) 157/79     Weight: 41.7 kg (92 lb)  Body mass index is 16.3 kg/m².    Physical Exam  Vitals and nursing note reviewed.   Constitutional:       General: She is awake. She is not in acute distress.     Appearance: Normal appearance. She is not ill-appearing, toxic-appearing or diaphoretic.      Comments: Elderly appearing female resting comfortably in bed.   HENT:      Head: Normocephalic and atraumatic.      Mouth/Throat:      Lips: Pink.      Mouth: Mucous membranes are moist.      Pharynx: Oropharynx is clear. Uvula midline.   Eyes:      Extraocular Movements: Extraocular movements intact.   Cardiovascular:      Rate and Rhythm: Normal rate and regular rhythm.      Pulses:           Radial pulses are 2+ on the right side and 2+ on the left side.        Dorsalis pedis pulses are 2+ on the right side and 2+ on the left side.      Heart sounds: Normal heart sounds. No murmur heard.  Pulmonary:      Effort: Pulmonary effort is normal.      Breath sounds: Decreased breath sounds present.   Abdominal:      General: Bowel sounds are normal.      Palpations: Abdomen is soft.      Tenderness: There is no abdominal tenderness.   Musculoskeletal:      Cervical back: Normal range of motion and neck supple.      Right lower leg: No edema.      Left lower leg: No edema.      Comments: BLISS   Skin:     General: Skin is warm and dry.   Neurological:       Mental Status: She is alert. Mental status is at baseline.      GCS: GCS eye subscore is 4. GCS verbal subscore is 5. GCS motor subscore is 6.   Psychiatric:         Speech: Speech normal.         Behavior: Behavior is cooperative.          LABS:  Recent Results (from the past 24 hour(s))   Influenza A & B by Molecular    Collection Time: 01/21/23  4:44 PM    Specimen: Nasopharyngeal Swab   Result Value Ref Range    Influenza A, Molecular Negative Negative    Influenza B, Molecular Negative Negative    Flu A & B Source Nasal swab    Blood culture x two cultures. Draw prior to antibiotics.    Collection Time: 01/21/23  4:58 PM    Specimen: Peripheral, Forearm, Right; Blood   Result Value Ref Range    Blood Culture, Routine No growth to date    CBC auto differential    Collection Time: 01/21/23  4:59 PM   Result Value Ref Range    WBC 14.80 (H) 3.90 - 12.70 K/uL    RBC 4.16 4.00 - 5.40 M/uL    Hemoglobin 11.4 (L) 12.0 - 16.0 g/dL    Hematocrit 34.9 (L) 37.0 - 48.5 %    MCV 84 82 - 98 fL    MCH 27.4 27.0 - 31.0 pg    MCHC 32.7 32.0 - 36.0 g/dL    RDW 16.5 (H) 11.5 - 14.5 %    Platelets 249 150 - 450 K/uL    MPV 10.3 9.2 - 12.9 fL    Immature Granulocytes 0.5 0.0 - 0.5 %    Gran # (ANC) 13.7 (H) 1.8 - 7.7 K/uL    Immature Grans (Abs) 0.08 (H) 0.00 - 0.04 K/uL    Lymph # 0.4 (L) 1.0 - 4.8 K/uL    Mono # 0.6 0.3 - 1.0 K/uL    Eos # 0.0 0.0 - 0.5 K/uL    Baso # 0.04 0.00 - 0.20 K/uL    nRBC 0 0 /100 WBC    Gran % 92.6 (H) 38.0 - 73.0 %    Lymph % 2.8 (L) 18.0 - 48.0 %    Mono % 3.8 (L) 4.0 - 15.0 %    Eosinophil % 0.0 0.0 - 8.0 %    Basophil % 0.3 0.0 - 1.9 %    Differential Method Automated    Comprehensive metabolic panel    Collection Time: 01/21/23  4:59 PM   Result Value Ref Range    Sodium 136 136 - 145 mmol/L    Potassium 3.3 (L) 3.5 - 5.1 mmol/L    Chloride 102 95 - 110 mmol/L    CO2 21 (L) 23 - 29 mmol/L    Glucose 119 (H) 70 - 110 mg/dL    BUN 10 10 - 30 mg/dL    Creatinine 0.6 0.5 - 1.4 mg/dL    Calcium 8.7 8.7  - 10.5 mg/dL    Total Protein 7.0 6.0 - 8.4 g/dL    Albumin 3.4 (L) 3.5 - 5.2 g/dL    Total Bilirubin 1.0 0.1 - 1.0 mg/dL    Alkaline Phosphatase 63 55 - 135 U/L    AST 16 10 - 40 U/L    ALT 11 10 - 44 U/L    Anion Gap 13 8 - 16 mmol/L    eGFR >60 >60 mL/min/1.73 m^2   Lactic acid, plasma #1    Collection Time: 01/21/23  4:59 PM   Result Value Ref Range    Lactate (Lactic Acid) 1.4 0.5 - 2.2 mmol/L   Troponin I    Collection Time: 01/21/23  4:59 PM   Result Value Ref Range    Troponin I 0.024 0.000 - 0.026 ng/mL   Procalcitonin    Collection Time: 01/21/23  4:59 PM   Result Value Ref Range    Procalcitonin 1.61 (H) <0.25 ng/mL   Brain natriuretic peptide    Collection Time: 01/21/23  4:59 PM   Result Value Ref Range     (H) 0 - 99 pg/mL   Blood culture x two cultures. Draw prior to antibiotics.    Collection Time: 01/21/23  5:31 PM    Specimen: Peripheral, Forearm, Left; Blood   Result Value Ref Range    Blood Culture, Routine No growth to date    Urinalysis, Reflex to Urine Culture Urine, Catheterized    Collection Time: 01/21/23  6:52 PM    Specimen: Urine   Result Value Ref Range    Specimen UA Urine, Catheterized     Color, UA Yellow Yellow, Straw, Elsa    Appearance, UA Clear Clear    pH, UA 7.0 5.0 - 8.0    Specific Gravity, UA 1.015 1.005 - 1.030    Protein, UA Negative Negative    Glucose, UA Negative Negative    Ketones, UA 1+ (A) Negative    Bilirubin (UA) Negative Negative    Occult Blood UA Negative Negative    Nitrite, UA Negative Negative    Urobilinogen, UA Negative <2.0 EU/dL    Leukocytes, UA Negative Negative   Lactic acid, plasma #2    Collection Time: 01/21/23  8:38 PM   Result Value Ref Range    Lactate (Lactic Acid) 2.1 0.5 - 2.2 mmol/L       RADIOLOGY  X-Ray Chest AP Portable    Result Date: 1/21/2023  EXAMINATION: XR CHEST AP PORTABLE CLINICAL HISTORY: Sepsis; TECHNIQUE: Single frontal view of the chest was performed. COMPARISON: None FINDINGS: No pneumonic consolidation, pleural  effusion or pneumothorax. Cardiomegaly.  Tortuous aorta.  Senescent changes. Bones are intact.     As above Electronically signed by: Serjio Salas Date:    01/21/2023 Time:    16:48    CT Chest Abdomen Pelvis With Contrast (xpd)    Result Date: 1/21/2023  EXAMINATION: CT CHEST ABDOMEN PELVIS WITH CONTRAST (XPD) CLINICAL HISTORY: Sepsis; TECHNIQUE: Low dose axial images, sagittal and coronal reformations were obtained from the thoracic inlet to the pubic symphysis following the IV administration of 75 mL of Omnipaque 350 COMPARISON: None FINDINGS: Mild reticulonodular opacities in the lung bases.  Heart and great vessels have a normal on gated appearance.  No evidence for mediastinal hematoma.  No adenopathy.  No definite fractures. No solid organ injury.  Distended gallbladder.  Intra and extrahepatic biliary dilatation.  Cardiomegaly with atherosclerotic changes.  Mild subsegmental atelectasis.  Atheroma involving the descending aorta.  Enlarged right ovarian structure measuring up to 54 mm.  Spleen pancreas adrenal glands gallbladder liver have a normal appearance.  No bowel obstruction free fluid or adenopathy.  Right-sided intrathecal lead with battery on the right side identified.  Severe compression deformity of L2 vertebral similar to prior exam.  Mild anterolisthesis of L4 with respect L5.  Small noncalcified gallstone.  Visualized uterus is grossly unremarkable.     Senescent changes.  No definite acute process seen Distended gallbladder with intra and extrahepatic biliary dilatation.  Small noncalcified gallstone. Mild atherosclerotic changes. Moderate amount of stool in the colon Small noncalcified gallstone Right ovarian cystic structure Severe compression deformity of the L2 Remaining findings as above All CT scans   are performed using dose optimization techniques including the following: automated exposure control; adjustment of the mA and/or kV; use of iterative reconstruction technique.  Dose  modulation was employed for ALARA by means of: Automated exposure control; adjustment of the mA and/or kV according to patient size (this includes techniques or standardized protocols for targeted exams where dose is matched to indication/reason for exam; i.e. extremities or head); and/or use of iterative reconstructive technique. Electronically signed by: Serjio Marina Date:    01/21/2023 Time:    20:42      EKG    MICROBIOLOGY    MDM     Amount and/or Complexity of Data Reviewed  Clinical lab tests: reviewed  Tests in the radiology section of CPT®: reviewed  Tests in the medicine section of CPT®: reviewed  Discussion of test results with the performing providers: yes  Decide to obtain previous medical records or to obtain history from someone other than the patient: yes  Review and summarize past medical records: yes  Discuss the patient with other providers: yes  Independent visualization of images, tracings, or specimens: yes

## 2023-01-22 NOTE — ASSESSMENT & PLAN NOTE
Chronic low back pain with sciatica, sciatica laterality unspecified, unspecified back pain laterality   History of fall on 11/19/22 with- multiple compression fractures, at that time she was seen by Ortho and felt no surgical interventions but would be ordered therapy and medicated for pain. Resident is now seen by pain management doctor.  Oxycodone 15mg BID PRN for pain;   Followed by pain management - Dr. Gregg Zimmerman at [facility]

## 2023-01-23 VITALS
DIASTOLIC BLOOD PRESSURE: 67 MMHG | OXYGEN SATURATION: 96 % | RESPIRATION RATE: 18 BRPM | HEIGHT: 63 IN | TEMPERATURE: 98 F | SYSTOLIC BLOOD PRESSURE: 150 MMHG | BODY MASS INDEX: 17.39 KG/M2 | WEIGHT: 98.13 LBS | HEART RATE: 93 BPM

## 2023-01-23 PROCEDURE — G0378 HOSPITAL OBSERVATION PER HR: HCPCS | Mod: HCNC

## 2023-01-23 PROCEDURE — 99222 PR INITIAL HOSPITAL CARE,LEVL II: ICD-10-PCS | Mod: HCNC,,, | Performed by: INTERNAL MEDICINE

## 2023-01-23 PROCEDURE — 25000003 PHARM REV CODE 250: Mod: HCNC | Performed by: NURSE PRACTITIONER

## 2023-01-23 PROCEDURE — 63600175 PHARM REV CODE 636 W HCPCS: Mod: HCNC | Performed by: NURSE PRACTITIONER

## 2023-01-23 PROCEDURE — 99222 1ST HOSP IP/OBS MODERATE 55: CPT | Mod: HCNC,,, | Performed by: INTERNAL MEDICINE

## 2023-01-23 RX ORDER — POLYETHYLENE GLYCOL 3350 17 G/17G
17 POWDER, FOR SOLUTION ORAL 2 TIMES DAILY
Status: DISCONTINUED | OUTPATIENT
Start: 2023-01-23 | End: 2023-01-23 | Stop reason: HOSPADM

## 2023-01-23 RX ORDER — METOPROLOL TARTRATE 1 MG/ML
2.5 INJECTION, SOLUTION INTRAVENOUS ONCE
Status: COMPLETED | OUTPATIENT
Start: 2023-01-23 | End: 2023-01-23

## 2023-01-23 RX ORDER — AMOXICILLIN AND CLAVULANATE POTASSIUM 875; 125 MG/1; MG/1
1 TABLET, FILM COATED ORAL 2 TIMES DAILY
Qty: 14 TABLET | Refills: 0 | Status: SHIPPED | OUTPATIENT
Start: 2023-01-23 | End: 2023-01-30

## 2023-01-23 RX ADMIN — MIDODRINE HYDROCHLORIDE 2.5 MG: 2.5 TABLET ORAL at 01:01

## 2023-01-23 RX ADMIN — METOROPROLOL TARTRATE 2.5 MG: 5 INJECTION, SOLUTION INTRAVENOUS at 04:01

## 2023-01-23 RX ADMIN — CEFEPIME 2 G: 2 INJECTION, POWDER, FOR SOLUTION INTRAVENOUS at 09:01

## 2023-01-23 RX ADMIN — METOPROLOL TARTRATE 25 MG: 25 TABLET, FILM COATED ORAL at 09:01

## 2023-01-23 RX ADMIN — OXYCODONE HYDROCHLORIDE 15 MG: 5 TABLET ORAL at 04:01

## 2023-01-23 RX ADMIN — MIDODRINE HYDROCHLORIDE 2.5 MG: 2.5 TABLET ORAL at 09:01

## 2023-01-23 NOTE — ASSESSMENT & PLAN NOTE
CT scan showed ductal dilatation.   LFTs have generally been unremarkable.   Patient asymptomatic.   MRCP ordered for further evaluation.

## 2023-01-23 NOTE — PLAN OF CARE
Patient remained free from injury. Elevated HR during the night, administered IV Lopressor. NPO status. 12hr chart check complete.

## 2023-01-23 NOTE — PLAN OF CARE
ALISON spoke with son ([phone]) over phone per patient request regarding hospice. Son states he wants to call [facility] director and see who his dad was under hospice care with.    Son to contact ALISON back with hospice agency choice, declines list of agencies at this time.

## 2023-01-23 NOTE — PLAN OF CARE
Telephone call to the hospital room ([phone]), spoke with patient's son, Rogelio.  Notified him of the change in status from Inpatient to Outpatient-Observation.

## 2023-01-23 NOTE — CONSULTS
[facility]  Adult Nutrition  Consult Note    SUMMARY     Recommendations  1. Recommend pt PO diet be advanced to adult regular diet when medically appropriate.   2. Recommend pt receives boost plus TID on tray with meals when PO diet is advanced.   3. Recommend to re consult for alternative nutrition support recs if pt PO diet can not be advanced within 72hrs.   4. Recommend pt continues current PRN bowel regiment, LBM 1/20.   5. Weigh weekly.    Goals:   1. Pt PO diet will be advanced within 24hrs.   2. Pt will consume >50% of energy needs by RD follow up.   3. Pt will pass bowel by RD follow up.  Nutrition Goal Status: new  Communication of RD Recs: other (comment) (POC: Sticky note)    Assessment and Plan    Nutrition Problem  Inadequate PO intake    Related to (etiology):   Decreased ability to consume sufficient nutrients    Signs and Symptoms (as evidenced by):   NPO    Interventions(treatment strategy):  1. Recommend pt PO diet be advanced to adult regular diet when medically appropriate.   2. Recommend pt receives boost plus TID on tray with meals when PO diet is advanced.   3. Recommend to re consult for alternative nutrition support recs if pt PO diet can not be advanced within 72hrs.   4. Recommend pt continues current PRN bowel regiment, LBM 1/20.   5. Weigh weekly.  6. Collaboration of care with medical provider.      Nutrition Diagnosis Status:   New      Malnutrition Assessment  Malnutrition Type: acute illness or injury  Energy Intake: severe energy intake  Skin (Micronutrient): dry, bruised, turgor reduced   Micronutrient Evaluation: suspected deficiency   Weight Loss (Malnutrition): greater than 7.5% in 3 months  Energy Intake (Malnutrition): less than or equal to 50% for greater than or equal to 5 days  Subcutaneous Fat (Malnutrition): moderate depletion  Muscle Mass (Malnutrition): severe depletion   Orbital Region (Subcutaneous Fat Loss): severe depletion  Upper Arm Region (Subcutaneous  Fat Loss): moderate depletion  Thoracic and Lumbar Region: moderate depletion   [place] Region (Muscle Loss): severe depletion  Clavicle Bone Region (Muscle Loss): moderate depletion  Clavicle and Acromion Bone Region (Muscle Loss): moderate depletion  Scapular Bone Region (Muscle Loss): mild depletion  Dorsal Hand (Muscle Loss): moderate depletion  Anterior Thigh Region (Muscle Loss): moderate depletion  Posterior Calf Region (Muscle Loss): moderate depletion   Edema (Fluid Accumulation): 0-->no edema present   Subcutaneous Fat Loss (Final Summary): moderate protein-calorie malnutrition  Muscle Loss Evaluation (Final Summary): moderate protein-calorie malnutrition    Severe Weight Loss (Malnutrition): greater than 7.5% in 3 months    Reason for Assessment    Reason For Assessment: consult, identified at risk by screening criteria  Diagnosis: other (see comments) (Generalized weakness)  Relevant Medical History: Osteoporosis, HTN, HLD, Drug induced constipation, Bilateral hearing loss  Interdisciplinary Rounds: did not attend  General Information Comments:   1/23: 89 y.o female admitted w/ current principal problem of generalized weakness. Pt states that she typically has a big appetite however, within the past recent 2-3 weeks her appetite has begun to decrease significantly. Pt states that she just has no desire to consume food during meal time. However, the pt states that she does like boost. The pt PO status is NPO per pending abdominal testing. The Pt states that she has noticed that she has begun to loose wt since her appetite has begun decreasing. Pt states that her UBW is near 115lbs-120lbs. Per the pt past wt encounters, she has lost 12lbs within the past 3 months. 11% wt change. NFPE completed, Pt displays signs of both muscle wasting and fat depletion. Per chart review/ NPFE, the pt skin is dry, bruised, and has poor turgor. The pt Tarun score is 14 with no noted wounds. The pt LBM was 1/20, was initiated  "onto bowel regiment 1/22 (Senna docusate PO, daily). All pertinent labs and medications reviewed. Dietitian will continue to monitor.  Nutrition Discharge Planning: liberalized generally healthy adult regular diet    Nutrition Risk Screen    Nutrition Risk Screen: reduced oral intake over the last month, unintentional loss of 10 lbs or more in the past 2 months    Nutrition/Diet History    Spiritual, Cultural Beliefs, Hindu Practices, Values that Affect Care: no  Food Allergies: NKFA  Factors Affecting Nutritional Intake: constipation, decreased appetite, dry mouth, NPO    Anthropometrics    Temp: 97.5 °F (36.4 °C)  Height Method: Stated  Height: 5' 3" (160 cm)  Height (inches): 63 in  Weight Method: Bed Scale  Weight: 44.5 kg (98 lb 1.7 oz)  Weight (lb): 98.11 lb  Ideal Body Weight (IBW), Female: 115 lb  % Ideal Body Weight, Female (lb): 85.31 %  BMI (Calculated): 17.4  BMI Grade: 17 - 18.4 protein-energy malnutrition grade I       Lab/Procedures/Meds  BMP  Lab Results   Component Value Date     01/22/2023    K 3.6 01/22/2023     01/22/2023    CO2 23 01/22/2023    BUN 9 (L) 01/22/2023    CREATININE 0.5 01/22/2023    CALCIUM 8.9 01/22/2023    ANIONGAP 11 01/22/2023    EGFRNORACEVR >60 01/22/2023       Pertinent Labs Reviewed: reviewed  Pertinent Medications Reviewed: reviewed  Scheduled Meds:   ceFEPime (MAXIPIME) IVPB  2 g Intravenous Q12H    metoprolol tartrate  25 mg Oral BID    midodrine  2.5 mg Oral TID WM    polyethylene glycol  17 g Oral BID     Continuous Infusions:   sodium chloride 0.9% 50 mL/hr at 01/22/23 1324     PRN Meds:.acetaminophen, aluminum-magnesium hydroxide-simethicone, dextrose 10%, dextrose 10%, docusate sodium, glucagon (human recombinant), glucose, glucose, melatonin, naloxone, oxyCODONE, simethicone, sodium chloride 0.9%        Estimated/Assessed Needs    Weight Used For Calorie Calculations: 44.5 kg (98 lb 1.7 oz)  Energy Calorie Requirements (kcal): 4936-3321 " (30-35kcal/kg per underweight)  Energy Need Method: Kcal/kg  Protein Requirements: 54-67 (1.2-1.5g/kg per underweight)  Weight Used For Protein Calculations: 44.5 kg (98 lb 1.7 oz)  Fluid Requirements (mL): 1335  Estimated Fluid Requirement Method: RDA Method  RDA Method (mL): 1335  CHO Requirement: 167      Nutrition Prescription Ordered    Current Diet Order: NPO    Evaluation of Received Nutrient/Fluid Intake    % Kcal Needs: 0  % Protein Needs: 0  I/O: 0  Energy Calories Required: not meeting needs  Protein Required: not meeting needs  Fluid Required: not meeting needs  Tolerance: not tolerating  % Intake of Estimated Energy Needs: 0 - 25 %  % Meal Intake: NPO    Nutrition Risk    Level of Risk/Frequency of Follow-up: high       Monitor and Evaluation    Food and Nutrient Intake: energy intake, food and beverage intake  Food and Nutrient Adminstration: diet order  Knowledge/Beliefs/Attitudes: food and nutrition knowledge/skill, beliefs and attitudes  Anthropometric Measurements: weight, weight change, body mass index  Biochemical Data, Medical Tests and Procedures: electrolyte and renal panel, gastrointestinal profile, glucose/endocrine profile, inflammatory profile, lipid profile  Nutrition-Focused Physical Findings: overall appearance       Nutrition Follow-Up    RD Follow-up?: Yes  Farrukh Aguilar, Registration Eligible, Provisional LDN

## 2023-01-23 NOTE — CODE 44
"MEDICARE CONDITION 44    (Reference: Transmittal 200 of the Medicare Manual)    A Medicare "Inpatient Admission" may be changed to an "Outpatient" (includes  Outpatient Observation) status, if the following conditions exist:  The change in patient status from inpatient to outpatient is made prior to        discharge or release, while the patient is still a patient in the hospital.   The hospital has not submitted a claim for the inpatient admission.  A physician concurs with the Utilization Committee decision.   Physician concurrence with the Utilization Committee's decision is documented         in the patient's records.         IMPLEMENTATION OF CONDITION CODE 44    Inpatient status has been reviewed prior to discharge. Change to Outpatient Observation status, in accordance with Condition Code 44.     By entering this in the medical record, I verify that I have reviewed and concur with the findings of the Utilization Review Committee and the Utilization Review Physician, and that the identified Patient does not meet medical necessity for Inpatient status. This patient is appropriate for the downgrade in status because per Payor Guidelines and Policy they have determined Observation to be the correct level of care. Outpatient Observation is the identified level of care appropriate for the Patient, and all of the above conditions for this status change have been met.     Omar Robledo  Attending   "

## 2023-01-23 NOTE — PLAN OF CARE
[facility]  Discharge Final Note    Primary Care Provider: Marcie Espinal MD    Expected Discharge Date: 1/23/2023    Final Discharge Note (most recent)       Final Note - 01/23/23 1506          Final Note    Assessment Type Final Discharge Note     Anticipated Discharge Disposition Hospice/Home     Hospital Resources/Appts/Education Provided Post-Acute resouces added to AVS                     Important Message from Medicare             Contact Info       [facility]   Specialty: Hospice Services    [address] [place] [id number]   Phone: [phone]       Next Steps: Follow up    Instructions: HOSPICE          DC Dispo: home to [facility]  Transportation: son    Referral sent via careport per son request. Patient/son to meet with hospice agency at [facility].

## 2023-01-23 NOTE — DISCHARGE INSTRUCTIONS
[facility]   Specialty: Hospice Services    [address] [address]   Phone: [phone]         Next Steps: Follow up     Instructions: HOSPICE

## 2023-01-23 NOTE — PLAN OF CARE
Nutrition: 1/23  1. Recommend pt PO diet be advanced to adult regular diet when medically appropriate.   2. Recommend pt receives boost plus TID on tray with meals when PO diet is advanced.   3. Recommend to re consult for alternative nutrition support recs if pt PO diet can not be advanced within 72hrs.   4. Recommend pt continues current PRN bowel regiment, LBM 1/20.   5. Weigh weekly.  6. Collaboration of care with medical provider.    Farrukh Aguilar, Registration Eligible, Provisional LDN

## 2023-01-23 NOTE — PLAN OF CARE
DISCHARGE INSTRUCTIONS REVIEWED WITH PATIENT AND FAMILY AT BEDSIDE WITH VERBALIZATION OF UNDERSTANDING.  SALINE LOCK REMOVED FROM RIGHT ARM WITH PRESSURE DRESSING APPLIED. PATIENT ASSISTED WITH DRESSING. PRESCRIPTION GIVEN TO PATIENT'S SON.  PATIENT TRANSPORTED TO VEHICLE PER W/C ACCOMPANIED BY STAFF AND FAMILY

## 2023-01-23 NOTE — CONSULTS
[facility]  Gastroenterology  Consult Note    Patient Name: Francie Olsen  MRN: [id number]  Admission Date: 1/21/2023  Hospital Length of Stay: 2 days  Code Status: Full Code   Attending Provider: Omar Robledo, *   Consulting Provider: Jun Reyes PA-C  Primary Care Physician: Marcie Espinal MD  Principal Problem:Generalized weakness    Inpatient consult to Gastroenterology  Consult performed by: Jun Reyes PA-C  Consult ordered by: Mirella Yan MD  Reason for consult: Ductal dilatation        Subjective:     HPI:  The patient presented to the ER for fatigue and increased temperature. Work up revealed WBC 14.80, Hgb 11.4, potassium 3.3. Normal BUN and creatinine. Normal LFTs. Procalcitonin was 1.61. UA unremarkable. No acute changes in CXR. CT abd/pel/chest w/ contrast showed distended gallbladder.  Small noncalcified gallstone. Intra and extrahepatic biliary dilatation. Moderate amount of stool in the colon. Right ovarian cystic structure. Severe compression deformity of the L2, unchanged. HIDA showed no evidence of cholecystitis with gallbladder visualization at 60 minutes. However, no evidence of bowel activity raising the question of CBD obstruction. General surgery was consulted and no surgical intervention indicated at this time. MRCP pending to further evaluate the CBD. LFTs yesterday with T. Bili 1.1 but other LFTs normal. The patient denies nausea, vomiting, abdominal pain or heartburn but reports chronic constipation, dysphagia and weight loss.       Past Medical History:   Diagnosis Date    Acute blood loss anemia 11/24/2021    Chronic low back pain     Chronic mid back pain     Familial tremor     Hearing loss     Hyperlipemia     Hypertension     Osteoarthritis     Osteoporosis, unspecified     Primary insomnia 1/22/2023    SCC (squamous cell carcinoma), hand 2015    hand    Squamous cell carcinoma 9-10yrs ago    left ear       Past Surgical History:   Procedure  Laterality Date    CATARACT EXTRACTION      FEMUR FRACTURE SURGERY Right     HIP FRACTURE SURGERY Left aprox 2010    implant for nerve pain      YAG OU         Review of patient's allergies indicates:   Allergen Reactions    Codeine Nausea And Vomiting and Nausea Only    Meperidine Nausea And Vomiting and Nausea Only     Family History       Problem Relation (Age of Onset)    Cancer Mother    Diabetes Son          Tobacco Use    Smoking status: Former    Smokeless tobacco: Never   Substance and Sexual Activity    Alcohol use: No     Alcohol/week: 0.0 standard drinks    Drug use: No    Sexual activity: Never     Birth control/protection: Post-menopausal     Review of Systems   Constitutional:  Positive for fatigue and unexpected weight change. Negative for fever.   HENT:  Positive for hearing loss.    Eyes:  Negative for visual disturbance.   Respiratory:  Negative for cough and shortness of breath.    Cardiovascular:  Negative for chest pain.   Gastrointestinal:         As per HPI.   Genitourinary:  Negative for dysuria, frequency and hematuria.   Musculoskeletal:  Positive for back pain.   Skin:  Negative for rash.   Neurological:  Negative for seizures, syncope, numbness and headaches.   Hematological:  Does not bruise/bleed easily.   Psychiatric/Behavioral:  The patient is not nervous/anxious.    Objective:     Vital Signs (Most Recent):  Temp: 97.5 °F (36.4 °C) (01/23/23 0729)  Pulse: (!) 126 (01/23/23 0729)  Resp: (!) 22 (01/23/23 0729)  BP: (!) 177/91 (01/23/23 0729)  SpO2: 96 % (01/23/23 0729)   Vital Signs (24h Range):  Temp:  [97.5 °F (36.4 °C)-98.2 °F (36.8 °C)] 97.5 °F (36.4 °C)  Pulse:  [] 126  Resp:  [16-22] 22  SpO2:  [95 %-98 %] 96 %  BP: (113-177)/(61-94) 177/91     Weight: 44.5 kg (98 lb 1.7 oz) (01/22/23 1224)  Body mass index is 17.38 kg/m².    No intake or output data in the 24 hours ending 01/23/23 0907    Lines/Drains/Airways       Peripheral Intravenous Line  Duration                   Peripheral IV - Single Lumen 01/21/23 1700 22 G Anterior;Right Forearm 1 day                    Physical Exam  Constitutional:       General: She is not in acute distress.     Appearance: She is cachectic.   HENT:      Head: Normocephalic and atraumatic.   Eyes:      Extraocular Movements: Extraocular movements intact.   Cardiovascular:      Rate and Rhythm: Normal rate. Rhythm irregular.      Heart sounds: No murmur heard.  Pulmonary:      Effort: Pulmonary effort is normal. No respiratory distress.      Breath sounds: Normal breath sounds. No wheezing.   Abdominal:      General: Bowel sounds are normal. There is no distension.      Palpations: Abdomen is soft. There is no mass.      Tenderness: There is no abdominal tenderness.   Musculoskeletal:      Cervical back: Normal range of motion and neck supple.      Right lower leg: No edema.      Left lower leg: No edema.   Skin:     General: Skin is warm and dry.      Findings: No rash.   Neurological:      Mental Status: She is alert and oriented to person, place, and time.      Cranial Nerves: No cranial nerve deficit (decreased hearing).   Psychiatric:         Behavior: Behavior normal.       Significant Labs:  CBC:   Recent Labs   Lab 01/21/23  1659 01/22/23  0827   WBC 14.80* 11.19   HGB 11.4* 10.7*   HCT 34.9* 32.9*    233     CMP:   Recent Labs   Lab 01/22/23  0827   GLU 90   CALCIUM 8.9   ALBUMIN 3.0*   PROT 6.4      K 3.6   CO2 23      BUN 9*   CREATININE 0.5   ALKPHOS 61   ALT 9*   AST 14   BILITOT 1.1*     Coagulation: No results for input(s): PT, INR, APTT in the last 48 hours.    Significant Imaging:  Imaging results within the past 24 hours have been reviewed.    Assessment/Plan:     Abnormal finding on CT scan  CT scan showed ductal dilatation.   LFTs have generally been unremarkable.   Patient asymptomatic.   MRCP ordered for further evaluation.         Drug induced constipation  Start Miralax bid.     Hypokalemia  Improved  with replacement. Monitor.     Leukocytosis  Improved. Monitor.         Thank you for your consult. I will follow-up with patient. Please contact us if you have any additional questions.    Jun Reyes PA-C  Gastroenterology  [facility]

## 2023-01-23 NOTE — SUBJECTIVE & OBJECTIVE
Past Medical History:   Diagnosis Date    Acute blood loss anemia 11/24/2021    Chronic low back pain     Chronic mid back pain     Familial tremor     Hearing loss     Hyperlipemia     Hypertension     Osteoarthritis     Osteoporosis, unspecified     Primary insomnia 1/22/2023    SCC (squamous cell carcinoma), hand 2015    hand    Squamous cell carcinoma 9-10yrs ago    left ear       Past Surgical History:   Procedure Laterality Date    CATARACT EXTRACTION      FEMUR FRACTURE SURGERY Right     HIP FRACTURE SURGERY Left aprox 2010    implant for nerve pain      YAG OU         Review of patient's allergies indicates:   Allergen Reactions    Codeine Nausea And Vomiting and Nausea Only    Meperidine Nausea And Vomiting and Nausea Only     Family History       Problem Relation (Age of Onset)    Cancer Mother    Diabetes Son          Tobacco Use    Smoking status: Former    Smokeless tobacco: Never   Substance and Sexual Activity    Alcohol use: No     Alcohol/week: 0.0 standard drinks    Drug use: No    Sexual activity: Never     Birth control/protection: Post-menopausal     Review of Systems   Constitutional:  Positive for fatigue and unexpected weight change. Negative for fever.   HENT:  Positive for hearing loss.    Eyes:  Negative for visual disturbance.   Respiratory:  Negative for cough and shortness of breath.    Cardiovascular:  Negative for chest pain.   Gastrointestinal:         As per HPI.   Genitourinary:  Negative for dysuria, frequency and hematuria.   Musculoskeletal:  Positive for back pain.   Skin:  Negative for rash.   Neurological:  Negative for seizures, syncope, numbness and headaches.   Hematological:  Does not bruise/bleed easily.   Psychiatric/Behavioral:  The patient is not nervous/anxious.    Objective:     Vital Signs (Most Recent):  Temp: 97.5 °F (36.4 °C) (01/23/23 0729)  Pulse: (!) 126 (01/23/23 0729)  Resp: (!) 22 (01/23/23 0729)  BP: (!) 177/91 (01/23/23 0729)  SpO2: 96 % (01/23/23  0729)   Vital Signs (24h Range):  Temp:  [97.5 °F (36.4 °C)-98.2 °F (36.8 °C)] 97.5 °F (36.4 °C)  Pulse:  [] 126  Resp:  [16-22] 22  SpO2:  [95 %-98 %] 96 %  BP: (113-177)/(61-94) 177/91     Weight: 44.5 kg (98 lb 1.7 oz) (01/22/23 1224)  Body mass index is 17.38 kg/m².    No intake or output data in the 24 hours ending 01/23/23 0907    Lines/Drains/Airways       Peripheral Intravenous Line  Duration                  Peripheral IV - Single Lumen 01/21/23 1700 22 G Anterior;Right Forearm 1 day                    Physical Exam  Constitutional:       General: She is not in acute distress.     Appearance: She is cachectic.   HENT:      Head: Normocephalic and atraumatic.   Eyes:      Extraocular Movements: Extraocular movements intact.   Cardiovascular:      Rate and Rhythm: Normal rate. Rhythm irregular.      Heart sounds: No murmur heard.  Pulmonary:      Effort: Pulmonary effort is normal. No respiratory distress.      Breath sounds: Normal breath sounds. No wheezing.   Abdominal:      General: Bowel sounds are normal. There is no distension.      Palpations: Abdomen is soft. There is no mass.      Tenderness: There is no abdominal tenderness.   Musculoskeletal:      Cervical back: Normal range of motion and neck supple.      Right lower leg: No edema.      Left lower leg: No edema.   Skin:     General: Skin is warm and dry.      Findings: No rash.   Neurological:      Mental Status: She is alert and oriented to person, place, and time.      Cranial Nerves: No cranial nerve deficit (decreased hearing).   Psychiatric:         Behavior: Behavior normal.       Significant Labs:  CBC:   Recent Labs   Lab 01/21/23  1659 01/22/23  0827   WBC 14.80* 11.19   HGB 11.4* 10.7*   HCT 34.9* 32.9*    233     CMP:   Recent Labs   Lab 01/22/23  0827   GLU 90   CALCIUM 8.9   ALBUMIN 3.0*   PROT 6.4      K 3.6   CO2 23      BUN 9*   CREATININE 0.5   ALKPHOS 61   ALT 9*   AST 14   BILITOT 1.1*     Coagulation:  No results for input(s): PT, INR, APTT in the last 48 hours.    Significant Imaging:  Imaging results within the past 24 hours have been reviewed.

## 2023-01-23 NOTE — PLAN OF CARE
Son returned call, verbalized choice for [facility]. Verbal choice obtained and choice form placed in chart. Referral sent via careport and [place] with [facility].

## 2023-01-24 NOTE — DISCHARGE SUMMARY
[facility] Medicine  Discharge Summary      Patient Name: Francie Olsen  MRN: [id number]  DEYANIRA: [id number]  Patient Class: OP- Observation  Admission Date: 1/21/2023  Hospital Length of Stay: 2 days  Discharge Date and Time: 1/23/2023  3:56 PM  Attending Physician: No att. providers found   Discharging Provider: Omar Robledo MD  Primary Care Provider: Marcie Espinal MD    Primary Care Team: Networked reference to record PCT     HPI:   Francie Olsen is a 89 y.o. female with a PMH  has a past medical history of Acute blood loss anemia (11/24/2021), Chronic low back pain, Chronic mid back pain, Familial tremor, Hearing loss, Hyperlipemia, Hypertension, Osteoarthritis, Osteoporosis, unspecified, SCC (squamous cell carcinoma), hand (2015), and Squamous cell carcinoma (9-10yrs ago).  Presents to the ER for evaluation of generalized weakness/fatigue.  Patient from assisted living facility and staff at the facility noted patient's temperature to be 2° higher than her baseline temperature .  Patient's complaint this time is fatigue, non-productive cough and chronic back pain.  History of compression fractures and thoracic and lumbar region.  Otherwise, patient voices no complaints at this time.      ER workup revealed leukocytosis 14.8, potassium of 3.3, CBG 2119 mg/dL, , program of 1.61, chest x-ray cardiomegaly, flu/COVID negative, UA negative, initial EKG shows sinus tachycardia with a ventricular the rate of 145 beats per minute with a QT/QTC of 336/521.  Repeat EKG after IV fluids and treatment revealed sinus rhythm with first-degree AV block with PVCs with ventricular rate of 77 beats per minute.  QT/ QTC 4/452.  ER for fall CT of chest and abdomen/pelvis with contrast due to meeting SIRS criteria. Imaging revealed:[Distended gallbladder with intra and extrahepatic biliary dilatation.  Small noncalcified gallstone].  General surgery consult and recommended HIDA scan.  If positive  consult IR for possible michelle tube (per secure chat from ER provider Dr. Stafford).  Hospital Medicine consulted to admit patient for HIDA scan and further workup.  Patient will be under inpatient status.  PCP: Marcie Espinal          * No surgery found *      Hospital Course:   89 y/o wf admitted with a dx of sepsis of unknown origin  . Started On IVAB . CT abd  show CBD dilation . HID scan negative  for acute cholecystitis . GI consulted and rec MRCP . Pt and son decided  not to pursued more tx or diagnotic test . She is aao x 3 in nad and request to be d/c home with hospice . Pt Son is a bedside and agree with her decision . Pt will be d/c home on po Augmentin with hospice care .         Goals of Care Treatment Preferences:  Code Status: Full Code      Consults:   Consults (From admission, onward)        Status Ordering Provider     Inpatient consult to Registered Dietitian/Nutritionist  Once        Provider:  (Not yet assigned)    Completed EZEQUIEL MADRID.     Inpatient consult to Gastroenterology  Once        Provider:  Dayana Nichole MD    Completed HANNY TSANG     Inpatient consult to General Surgery  Once        Provider:  Tanya Butler DO    Completed GURVINDER MADRID          * Generalized weakness  WBC of 14.8. Elevation likely 2/2 due to intrabdominal infection/inflammatory process/acute findings of distending GB w/biliary dilation vs unknown infections process. Initiated on 2g cefepime in ED. Flu/covid negative. procal of 1.61. UA wnl. CXR shows cardiomegaly. VSS.  Plan:  -continue cefepime 2g q12hrs  -f/u blood cultures  -gentle IVFs  -f/u HIDA scan results  -monitor labs      Drug induced constipation  -Movantik 25 mg QD PRN  -Colace 100mg QD PRN      Primary insomnia  -5mg melatonin qHS prn      Hypokalemia  Patient has hypokalemia which is currently treated with po potassium in ED.. Last electrolytes reviewed-   No results for input(s): K in the last 48 hours.. Will replace  potassium and monitor electrolytes closely.   - Received 40 meq in the ER and ordered an additional 40 meq this morning.        Abnormal finding on CT scan  Findings of [distended gallbladder with intra and extrahepatic biliary dilatation.  Small noncalcified gallstone].  General surgery consult and recommended HIDA scan.  If positive consult IR for possible Jeniffer tube.  Plan:  -HIDA scan pending  -GenSurg consult  -IR consult if HIDA scan positive  -NPO for scan.  Will advance if no plans for procedure today.    Leukocytosis  WBC of 14.8. Elevation likely 2/2 due to intrabdominal infection/inflammatory process/acute findings of distending GB w/biliary dilation vs unknown infections process. Initiated on 2g cefepime in ED. Flu/covid negative. procal of 1.61. UA wnl. CXR shows cardiomegaly.  Plan:  -continue cefepime 2g q12hrs  -f/u blood cultures  -gentle IVFs  -f/u HIDA scan results  -monitor labs      Final Active Diagnoses:    Diagnosis Date Noted POA    PRINCIPAL PROBLEM:  Generalized weakness [R53.1] 11/24/2021 Yes    Leukocytosis [D72.829] 01/22/2023 Yes    Abnormal finding on CT scan [R93.89] 01/22/2023 Yes    Hypokalemia [E87.6] 01/22/2023 Yes    Irregular heart rhythm [I49.9] 01/22/2023 Yes    Primary insomnia [F51.01] 01/22/2023 Yes    Drug induced constipation [K59.03] 01/22/2023 Unknown    Abnormal CT scan, gallbladder [R93.2] 01/22/2023 Yes    Chronic low back pain [M54.50, G89.29] 09/25/2017 Yes      Problems Resolved During this Admission:       Discharged Condition: stable    Disposition: Hospice/Home    Follow Up:   Follow-up Information     [facility] Follow up.    Specialty: Hospice Services  Why: HOSPICE  Contact information:  [address] [facility] [place] [address] [phone]                       Patient Instructions:      Diet Adult Regular     Activity as tolerated       Significant Diagnostic Studies: Labs: BMP: No results for input(s): GLU, NA, K, CL, CO2, BUN,  CREATININE, CALCIUM, MG in the last 48 hours., CMP No results for input(s): NA, K, CL, CO2, GLU, BUN, CREATININE, CALCIUM, PROT, ALBUMIN, BILITOT, ALKPHOS, AST, ALT, ANIONGAP, ESTGFRAFRICA, EGFRNONAA in the last 48 hours. and CBC No results for input(s): WBC, HGB, HCT, PLT in the last 48 hours.  Microbiology:   Blood Culture   Lab Results   Component Value Date    LABBLOO No growth to date 01/21/2023    LABBLOO No Growth to date 01/21/2023    LABBLOO No Growth to date 01/21/2023    LABBLOO No Growth to date 01/21/2023       Pending Diagnostic Studies:     None         Medications:  Reconciled Home Medications:      Medication List      START taking these medications    amoxicillin-clavulanate 875-125mg 875-125 mg per tablet  Commonly known as: AUGMENTIN  Take 1 tablet by mouth 2 (two) times daily. for 7 days        CONTINUE taking these medications    docusate sodium 100 MG capsule  Commonly known as: COLACE  Take 100 mg by mouth daily as needed for Constipation.     melatonin 5 mg  Commonly known as: MELATIN  Take 5 mg by mouth nightly as needed.     metoprolol tartrate 25 MG tablet  Commonly known as: LOPRESSOR  Take 1 tablet (25 mg total) by mouth 2 (two) times daily.     midodrine 2.5 MG Tab  Commonly known as: PROAMATINE  TAKE 1 TAB BY MOUTH THREE TIMES DAILY     MOVANTIK 25 mg tablet  Generic drug: naloxegoL  Take 1 tablet by mouth once a day as needed for pain     oxyCODONE 15 MG Tab  Commonly known as: ROXICODONE  Take 1 tablet by mouth twice a day as needed for pain            Indwelling Lines/Drains at time of discharge:   Lines/Drains/Airways     None                 Time spent on the discharge of patient: 30 minutes         Omar Robledo MD  Department of Hospital Medicine  [facility] Surg

## 2023-01-24 NOTE — ASSESSMENT & PLAN NOTE
Patient has hypokalemia which is currently treated with po potassium in ED.. Last electrolytes reviewed-   No results for input(s): K in the last 48 hours.. Will replace potassium and monitor electrolytes closely.   - Received 40 meq in the ER and ordered an additional 40 meq this morning.

## 2023-01-24 NOTE — HOSPITAL COURSE
89 y/o wf admitted with a dx of sepsis of unknown origin  . Started On IVAB . CT abd  show CBD dilation . HID scan negative  for acute cholecystitis . GI consulted and rec MRCP . Pt and son decided  not to pursued more tx or diagnotic test . She is aao x 3 in nad and request to be d/c home with hospice . Pt Son is a bedside and agree with her decision . Pt will be d/c home on po Augmentin with hospice care .

## 2023-01-27 LAB
BACTERIA BLD CULT: NORMAL
BACTERIA BLD CULT: NORMAL

## 2023-02-09 DIAGNOSIS — Z00.00 ENCOUNTER FOR MEDICARE ANNUAL WELLNESS EXAM: ICD-10-CM

## 2023-02-13 ENCOUNTER — TELEPHONE (OUTPATIENT)
Dept: FAMILY MEDICINE | Facility: CLINIC | Age: 88
End: 2023-02-13

## 2023-02-14 NOTE — TELEPHONE ENCOUNTER
Please see forms from [facility] which has been faxed to me (perhaps a second time as it looks like 1st time faxed on 12/15/2022 during my absence from the clinic) for completion.  However, please call representative at [facility] to advise pt has not been seen by me since 3/2/2020 and do not know her current status and current medications. So, I will not be able to complete forms.  Maybe she has been recently following with other provider who can complete.

## 2023-03-09 ENCOUNTER — OFFICE VISIT (OUTPATIENT)
Dept: OPHTHALMOLOGY | Facility: CLINIC | Age: 88
End: 2023-03-09
Payer: MEDICARE

## 2023-03-09 DIAGNOSIS — H18.20 CORNEAL EDEMA OF RIGHT EYE: Primary | ICD-10-CM

## 2023-03-09 DIAGNOSIS — Z96.1 PSEUDOPHAKIA: ICD-10-CM

## 2023-03-09 PROCEDURE — 92012 PR EYE EXAM, EST PATIENT,INTERMED: ICD-10-PCS | Mod: HCNC,S$GLB,, | Performed by: OPHTHALMOLOGY

## 2023-03-09 PROCEDURE — 99999 PR PBB SHADOW E&M-EST. PATIENT-LVL I: CPT | Mod: PBBFAC,HCNC,, | Performed by: OPHTHALMOLOGY

## 2023-03-09 PROCEDURE — 1159F MED LIST DOCD IN RCRD: CPT | Mod: HCNC,CPTII,S$GLB, | Performed by: OPHTHALMOLOGY

## 2023-03-09 PROCEDURE — 1160F PR REVIEW ALL MEDS BY PRESCRIBER/CLIN PHARMACIST DOCUMENTED: ICD-10-PCS | Mod: HCNC,CPTII,S$GLB, | Performed by: OPHTHALMOLOGY

## 2023-03-09 PROCEDURE — 1160F RVW MEDS BY RX/DR IN RCRD: CPT | Mod: HCNC,CPTII,S$GLB, | Performed by: OPHTHALMOLOGY

## 2023-03-09 PROCEDURE — 99999 PR PBB SHADOW E&M-EST. PATIENT-LVL I: ICD-10-PCS | Mod: PBBFAC,HCNC,, | Performed by: OPHTHALMOLOGY

## 2023-03-09 PROCEDURE — 92012 INTRM OPH EXAM EST PATIENT: CPT | Mod: HCNC,S$GLB,, | Performed by: OPHTHALMOLOGY

## 2023-03-09 PROCEDURE — 1159F PR MEDICATION LIST DOCUMENTED IN MEDICAL RECORD: ICD-10-PCS | Mod: HCNC,CPTII,S$GLB, | Performed by: OPHTHALMOLOGY

## 2023-03-09 NOTE — PROGRESS NOTES
SUBJECTIVE  Francie Olsen is 89 y.o. female  Corrected distance visual acuity was 20/200 in the right eye and 20/50 in the left eye.   Chief Complaint   Patient presents with    Eye Problem     Check OD          HPI     Eye Problem     Additional comments: Check OD           Comments    States that her vision in her OD has declined, her son states that in Nov   after being seen she took a bad fall and since has been complaining about   her vision. He states that she has been now in a assisted living.     1. PCIOL OU  2. YAG OU  3. Dry AMD OS>OD  4. Dry Eyes  5. K edema OD (not interested in K consult)  6. Refractive Error            Last edited by Consuelo Price on 3/9/2023  9:25 AM.         Assessment /Plan :  1. Corneal edema of right eye  -- Condition stable. Surgical intervention would improve vision, but patient not interested in surgery at this time. Will continue to monitor routinely.     2. Pseudophakia      Return to clinic as scheduled

## 2023-04-11 ENCOUNTER — TELEPHONE (OUTPATIENT)
Dept: ADMINISTRATIVE | Facility: HOSPITAL | Age: 88
End: 2023-04-11
Payer: MEDICARE

## 2024-09-05 NOTE — PROGRESS NOTES
SUBJECTIVE:   Francie Olsen is a 89 y.o. female   Corrected distance visual acuity was 20/200 in the right eye and 20/40 +2 in the left eye.   Chief Complaint   Patient presents with    Annual Exam    Macular Degeneration    Dry Eye        HPI:  HPI     Pt here for annual exam for dry eye and AMD. Pt states she has noticed   her right eye's vision has declined a little bit. She says it's a little   hazy. OS is fine.     1. PCIOL OU  2. YAG OU  3. Dry AMD OS>OD  4. Dry Eyes  5. Refractive Error    OU: Clear Eyes PRN  AREDS    Last edited by Tono Quiñonez, Patient Care Assistant on 11/5/2019  8:36   AM. (History)        Assessment /Plan :  1. Pseudophakia stable   2. Corneal edema of right eye add Benja 128 5% ophthalmic drops QID OD   3. Intermediate stage nonexudative age-related macular degeneration of both eyes possible Wet AMD OD     RTC tomorrow for MOCT            
Detail Level: Zone
Plan: Ln2